# Patient Record
Sex: MALE | Race: WHITE | NOT HISPANIC OR LATINO | URBAN - METROPOLITAN AREA
[De-identification: names, ages, dates, MRNs, and addresses within clinical notes are randomized per-mention and may not be internally consistent; named-entity substitution may affect disease eponyms.]

---

## 2018-01-07 ENCOUNTER — INPATIENT (INPATIENT)
Facility: HOSPITAL | Age: 63
LOS: 5 days | Discharge: HOME | DRG: 163 | End: 2018-01-13
Attending: SPECIALIST | Admitting: SPECIALIST
Payer: MEDICAID

## 2018-01-07 VITALS
OXYGEN SATURATION: 97 % | TEMPERATURE: 98 F | RESPIRATION RATE: 18 BRPM | HEART RATE: 110 BPM | DIASTOLIC BLOOD PRESSURE: 70 MMHG | HEIGHT: 70 IN | SYSTOLIC BLOOD PRESSURE: 135 MMHG | WEIGHT: 201.94 LBS

## 2018-01-07 DIAGNOSIS — Z93.8 OTHER ARTIFICIAL OPENING STATUS: Chronic | ICD-10-CM

## 2018-01-07 DIAGNOSIS — D64.9 ANEMIA, UNSPECIFIED: ICD-10-CM

## 2018-01-07 DIAGNOSIS — R63.8 OTHER SYMPTOMS AND SIGNS CONCERNING FOOD AND FLUID INTAKE: ICD-10-CM

## 2018-01-07 DIAGNOSIS — Z29.9 ENCOUNTER FOR PROPHYLACTIC MEASURES, UNSPECIFIED: ICD-10-CM

## 2018-01-07 DIAGNOSIS — I34.1 NONRHEUMATIC MITRAL (VALVE) PROLAPSE: ICD-10-CM

## 2018-01-07 DIAGNOSIS — D47.3 ESSENTIAL (HEMORRHAGIC) THROMBOCYTHEMIA: ICD-10-CM

## 2018-01-07 DIAGNOSIS — J90 PLEURAL EFFUSION, NOT ELSEWHERE CLASSIFIED: ICD-10-CM

## 2018-01-07 DIAGNOSIS — I10 ESSENTIAL (PRIMARY) HYPERTENSION: ICD-10-CM

## 2018-01-07 DIAGNOSIS — J18.9 PNEUMONIA, UNSPECIFIED ORGANISM: ICD-10-CM

## 2018-01-07 LAB
ALBUMIN SERPL ELPH-MCNC: 3.2 G/DL — LOW (ref 3.3–5)
ALP SERPL-CCNC: 105 U/L — SIGNIFICANT CHANGE UP (ref 40–120)
ALT FLD-CCNC: 69 U/L — HIGH (ref 10–45)
ANION GAP SERPL CALC-SCNC: 13 MMOL/L — SIGNIFICANT CHANGE UP (ref 5–17)
APPEARANCE UR: CLEAR — SIGNIFICANT CHANGE UP
APTT BLD: 27 SEC — LOW (ref 27.5–37.4)
AST SERPL-CCNC: 27 U/L — SIGNIFICANT CHANGE UP (ref 10–40)
BASOPHILS NFR BLD AUTO: 0.4 % — SIGNIFICANT CHANGE UP (ref 0–2)
BILIRUB SERPL-MCNC: 0.3 MG/DL — SIGNIFICANT CHANGE UP (ref 0.2–1.2)
BILIRUB UR-MCNC: NEGATIVE — SIGNIFICANT CHANGE UP
BUN SERPL-MCNC: 14 MG/DL — SIGNIFICANT CHANGE UP (ref 7–23)
CALCIUM SERPL-MCNC: 8.5 MG/DL — SIGNIFICANT CHANGE UP (ref 8.4–10.5)
CHLORIDE SERPL-SCNC: 103 MMOL/L — SIGNIFICANT CHANGE UP (ref 96–108)
CK MB CFR SERPL CALC: 1.9 NG/ML — SIGNIFICANT CHANGE UP (ref 0–6.7)
CK SERPL-CCNC: 69 U/L — SIGNIFICANT CHANGE UP (ref 30–200)
CO2 SERPL-SCNC: 26 MMOL/L — SIGNIFICANT CHANGE UP (ref 22–31)
COLOR SPEC: YELLOW — SIGNIFICANT CHANGE UP
CREAT SERPL-MCNC: 0.95 MG/DL — SIGNIFICANT CHANGE UP (ref 0.5–1.3)
DIFF PNL FLD: NEGATIVE — SIGNIFICANT CHANGE UP
EOSINOPHIL NFR BLD AUTO: 2.4 % — SIGNIFICANT CHANGE UP (ref 0–6)
EXTRA SST TUBE: SIGNIFICANT CHANGE UP
GLUCOSE SERPL-MCNC: 122 MG/DL — HIGH (ref 70–99)
GLUCOSE UR QL: NEGATIVE — SIGNIFICANT CHANGE UP
HCT VFR BLD CALC: 38 % — LOW (ref 39–50)
HGB BLD-MCNC: 12.3 G/DL — LOW (ref 13–17)
INR BLD: 1.13 — SIGNIFICANT CHANGE UP (ref 0.88–1.16)
KETONES UR-MCNC: NEGATIVE — SIGNIFICANT CHANGE UP
LEUKOCYTE ESTERASE UR-ACNC: NEGATIVE — SIGNIFICANT CHANGE UP
LYMPHOCYTES # BLD AUTO: 14.9 % — SIGNIFICANT CHANGE UP (ref 13–44)
MCHC RBC-ENTMCNC: 29.5 PG — SIGNIFICANT CHANGE UP (ref 27–34)
MCHC RBC-ENTMCNC: 32.4 G/DL — SIGNIFICANT CHANGE UP (ref 32–36)
MCV RBC AUTO: 91.1 FL — SIGNIFICANT CHANGE UP (ref 80–100)
MONOCYTES NFR BLD AUTO: 6.5 % — SIGNIFICANT CHANGE UP (ref 2–14)
NEUTROPHILS NFR BLD AUTO: 75.8 % — SIGNIFICANT CHANGE UP (ref 43–77)
NITRITE UR-MCNC: POSITIVE
PH UR: 5.5 — SIGNIFICANT CHANGE UP (ref 5–8)
PLATELET # BLD AUTO: 523 K/UL — HIGH (ref 150–400)
POTASSIUM SERPL-MCNC: 3.8 MMOL/L — SIGNIFICANT CHANGE UP (ref 3.5–5.3)
POTASSIUM SERPL-SCNC: 3.8 MMOL/L — SIGNIFICANT CHANGE UP (ref 3.5–5.3)
PROT SERPL-MCNC: 6.8 G/DL — SIGNIFICANT CHANGE UP (ref 6–8.3)
PROT UR-MCNC: NEGATIVE MG/DL — SIGNIFICANT CHANGE UP
PROTHROM AB SERPL-ACNC: 12.6 SEC — SIGNIFICANT CHANGE UP (ref 9.8–12.7)
RBC # BLD: 4.17 M/UL — LOW (ref 4.2–5.8)
RBC # FLD: 12 % — SIGNIFICANT CHANGE UP (ref 10.3–16.9)
SODIUM SERPL-SCNC: 142 MMOL/L — SIGNIFICANT CHANGE UP (ref 135–145)
SP GR SPEC: >=1.03 — SIGNIFICANT CHANGE UP (ref 1–1.03)
TROPONIN T SERPL-MCNC: <0.01 NG/ML — SIGNIFICANT CHANGE UP (ref 0–0.01)
UROBILINOGEN FLD QL: 0.2 E.U./DL — SIGNIFICANT CHANGE UP
WBC # BLD: 10.6 K/UL — HIGH (ref 3.8–10.5)
WBC # FLD AUTO: 10.6 K/UL — HIGH (ref 3.8–10.5)

## 2018-01-07 PROCEDURE — 71045 X-RAY EXAM CHEST 1 VIEW: CPT | Mod: 26

## 2018-01-07 PROCEDURE — 93010 ELECTROCARDIOGRAM REPORT: CPT

## 2018-01-07 PROCEDURE — 99285 EMERGENCY DEPT VISIT HI MDM: CPT | Mod: 25

## 2018-01-07 PROCEDURE — 99223 1ST HOSP IP/OBS HIGH 75: CPT | Mod: GC

## 2018-01-07 RX ORDER — HEPARIN SODIUM 5000 [USP'U]/ML
5000 INJECTION INTRAVENOUS; SUBCUTANEOUS EVERY 8 HOURS
Qty: 0 | Refills: 0 | Status: DISCONTINUED | OUTPATIENT
Start: 2018-01-07 | End: 2018-01-09

## 2018-01-07 RX ORDER — ACETAMINOPHEN 500 MG
650 TABLET ORAL EVERY 6 HOURS
Qty: 0 | Refills: 0 | Status: DISCONTINUED | OUTPATIENT
Start: 2018-01-07 | End: 2018-01-09

## 2018-01-07 RX ORDER — AMLODIPINE BESYLATE 2.5 MG/1
10 TABLET ORAL DAILY
Qty: 0 | Refills: 0 | Status: DISCONTINUED | OUTPATIENT
Start: 2018-01-08 | End: 2018-01-08

## 2018-01-07 RX ADMIN — HEPARIN SODIUM 5000 UNIT(S): 5000 INJECTION INTRAVENOUS; SUBCUTANEOUS at 22:49
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
## 2018-01-07 NOTE — ED PROVIDER NOTE - OBJECTIVE STATEMENT
was admitted to a hospital in [place] about 10 days ago for pneumonia, pleural effusion on left.  had chest tube placed to drain for about 5 days  told he might require a VATS procedure.  wanted to come to [place] because his mother's pulmonologist is here.

## 2018-01-07 NOTE — H&P ADULT - ASSESSMENT
62M PMH HTN and MVP (Dx 1999) presented w/ SOB after leaving AMA from [facility] w/ L pleural effusion s/p chest tube.

## 2018-01-07 NOTE — ED ADULT TRIAGE NOTE - ESI TRIAGE ACUITY LEVEL, MLM
Date: 5/22/2019    Patient Name: Greg Henderson          To Whom it may concern: This letter has been written at the patient's request. The above patient was seen at the [facility] for treatment of a medical condition.     The patient li
2

## 2018-01-07 NOTE — H&P ADULT - PROBLEM SELECTOR PLAN 2
In ED patient w/ borderline tachycardia (around 100) and borderline leukocytosis (10.6), not neutrophilic predominant. Not tachypneic and normotensive. No distress on exam. Complains of dry cough, no sputum.  -Consider antibiotic therapy, Vanc/Zosyn given recent hospitalization  -F/u Pulm recs In ED patient w/ borderline tachycardia (around 100) and borderline leukocytosis (10.6), not neutrophilic predominant. Not tachypneic and normotensive. No distress on exam. Complains of dry cough, no sputum.  -Consider antibiotic therapy, Vanc/Zosyn given recent hospitalization -- obtain collateral from [facility] regarding Abx therapy, etc  -F/u Pulm recs In ED patient w/ borderline tachycardia (around 100) and borderline leukocytosis (10.6), not neutrophilic predominant. Not tachypneic and normotensive. No distress on exam. Complains of dry cough, no sputum.  -Consider antibiotic therapy, Vanc/Zosyn given recent hospitalization -- obtain collateral from [facility] regarding Abx therapy, etc. Called hospital and was given resident group pager number -- [phone]  -F/u Pulm recs

## 2018-01-07 NOTE — H&P ADULT - ATTENDING COMMENTS
Pt seen and examined at bedside on 1/6/2017 @ 2300    Agree with HPI, ROS as above.     VS, Labs, FH, SH, allergies, medications, imaging reviewed. Agree with physical exam as above    A/P: 82yo M with PMHx of HTN, HLD, thoracic aortic aneurysm, BPH, and benign bladder tumor s/p removal 15yrs ago, comes to the ED complaining of dysuria, frequency, fever and weakness since yesterday, found to have sepsis 2/2 UTI.    **Sepsis  -Pt meeting 3/4 SIRS criteria with + U/A in the setting of recent cystoscopy  -C/w Zosyn as per Urology recs (given increased risk of Pseudomonas with recent instrumentation)  -LA wnl  -F/u UCx, blood cultures  -Dr. Garcia in AM (patient's outpatient ID)    Plan otherwise as outlined above.... Pt seen and examined at bedside on 1/7/2017 @ 2300    Agree with HPI, ROS as above.     VS, Labs, FH, SH, allergies, medications, imaging reviewed. Agree with physical exam as above    A/P: 62M PMH HTN and MVP (Dx 1999) presented w/ SOB after leaving AMA from [facility] w/ L pleural effusion s/p chest tube.    **Pleural effusion  -    Plan otherwise as outlined above.... Pt seen and examined at bedside on 1/7/2017 @ 2300    Agree with HPI, ROS as above.     VS, Labs, FH, SH, allergies, medications, imaging reviewed. I personally reviewed the CT chest - large L sided pleural effusion. Agree with physical exam as above    A/P: 62M PMH HTN and MVP (Dx 1999) presented w/ SOB after leaving AMA from [facility] w/ L pleural effusion s/p chest tube.    **Pleural effusion  -Given history of PNA with pleural effusion as described above  -C/w Vancomycin/Zosyn - obtain collateral in AM from outside hospital  -Pulm consulted - will evaluate for possible thoracentesis vs VATS (pt would then require CT surgery consult)  -F/u cultures    Plan otherwise as outlined above....

## 2018-01-07 NOTE — H&P ADULT - PROBLEM SELECTOR PLAN 1
Had chest tube at [facility] [place] for approx 1 week. Received tPA and there was discussion of VATS. Patient preferred to come to 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 On exam, diminished BS on L side from mid lung inferiorly, consistent w/ findings on CXR on admission here. Patient does not have his records from [facility] on arrival to ED. Saturating 97-98% on RA in ED. Not a smoker but etiologic DDx should include malignancy.  -Pulm consult, F/u recs  -O2 PRN, not needed at this time Had chest tube at [facility] for approx 1 week. Received tPA and there was discussion of VATS. Patient preferred to come to 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 On exam, diminished BS on L side from mid lung inferiorly, consistent w/ findings on CXR on admission here. Patient does not have his records from [facility] on arrival to ED. Saturating 97-98% on RA in ED. Not a smoker but etiologic DDx should include malignancy.  -Pulm consulted, F/u recs -- they will see him in AM  -O2 PRN, not needed at this time

## 2018-01-07 NOTE — H&P ADULT - NSHPLABSRESULTS_GEN_ALL_CORE
LABS: REVIEWED                        12.3   10.6  )-----------( 523      ( 07 Jan 2018 15:16 )             38.0     01-07    142  |  103  |  14  ----------------------------<  122<H>  3.8   |  26  |  0.95    Ca    8.5      07 Jan 2018 15:16    TPro  6.8  /  Alb  3.2<L>  /  TBili  0.3  /  DBili  x   /  AST  27  /  ALT  69<H>  /  AlkPhos  105  01-07    PT/INR - ( 07 Jan 2018 15:16 )   PT: 12.6 sec;   INR: 1.13       PTT - ( 07 Jan 2018 15:16 )  PTT:27.0 sec    CARDIAC MARKERS ( 07 Jan 2018 15:16 )  x     / <0.01 ng/mL / 69 U/L / x     / 1.9 ng/mL    RADIOLOGY, EKG & ADDITIONAL TESTS: REVIEWED

## 2018-01-07 NOTE — H&P ADULT - FAMILY HISTORY
Mother  Still living? Unknown  Family history of Wegener's granulomatosis, Age at diagnosis: Age Unknown  Family history of atrial fibrillation, Age at diagnosis: Age Unknown  Family history of hypertension, Age at diagnosis: Age Unknown     Sibling  Still living? Unknown  Family history of mitral valve prolapse, Age at diagnosis: Age Unknown

## 2018-01-07 NOTE — ED ADULT NURSE NOTE - OBJECTIVE STATEMENT
pt having SOB and left sided CP since yesterday ,was discharged from hospital in [place] on Friday,diagnosed with pneumonia and pleural effusion ,had a left sided chest drain inserted and removed in [place] pt having SOB and left sided CP since yesterday ,was discharged from hospital in [place] on Friday,diagnosed with pneumonia and pleural effusion ,had a left sided chest drain inserted and removed in [place],states that a large amount of fluid was drained pt having SOB and left sided CP since yesterday ,was discharged from hospital in [place] on Friday,diagnosed with pneumonia and pleural effusion ,had a left sided chest drain inserted and removed in [place],states that a large amount of fluid was drained,pt also states that he had TPA on Jan 4th

## 2018-01-07 NOTE — H&P ADULT - NSHPSOCIALHISTORY_GEN_ALL_CORE
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 Has never smoked cigarettes, used alcohol or drugs.

## 2018-01-07 NOTE — H&P ADULT - NSHPPHYSICALEXAM_GEN_ALL_CORE
VITAL SIGNS:  T(C): 36.9 (01-07-18 @ 14:34), Max: 36.9 (01-07-18 @ 14:34)  T(F): 98.4 (01-07-18 @ 14:34), Max: 98.4 (01-07-18 @ 14:34)  HR: 103 (01-07-18 @ 15:16) (103 - 110)  BP: 142/82 (01-07-18 @ 15:16) (135/70 - 142/82)  BP(mean): --  RR: 18 (01-07-18 @ 15:16) (18 - 18)  SpO2: 98% (01-07-18 @ 15:16) (97% - 98%)  Wt(kg): --    PHYSICAL EXAM:  Constitutional: WDWN sitting up in chair in NAD; well-appearing  Eyes: PERRL, EOMI, anicteric sclera  ENT: no nasal discharge; uvula midline, no oropharyngeal erythema or exudates; MMM  Respiratory: decreased/absent breath sounds from mid lung inferiorly on L side; otherwise CTA B/L; no W/R/R, no retractions; no respiratory distress  Cardiac: +S1/S2; RRR, borderline tachycardia; systolic murmur auscultated  Gastrointestinal: soft, round, NT/ND; no rebound or guarding; +BS  Extremities: WWP; no peripheral edema, radial/pedal pulses + b/L  Lymphatic: no submandibular or cervical LAD  Neurologic: AOx3; CNII-XII grossly intact

## 2018-01-07 NOTE — ED ADULT TRIAGE NOTE - CHIEF COMPLAINT QUOTE
Patient c/o sob and chest pain for 2 days . Pt. was d/c from [facility] last friday for PNA and pleural effusion .

## 2018-01-07 NOTE — ED ADULT NURSE NOTE - CHIEF COMPLAINT QUOTE
Patient c/o sob and chest pain for 2 days . Pt. was d/c from [facility] last friday for PNA and pleural effusion .

## 2018-01-08 DIAGNOSIS — J86.9 PYOTHORAX WITHOUT FISTULA: ICD-10-CM

## 2018-01-08 LAB
ALBUMIN SERPL ELPH-MCNC: 2.6 G/DL — LOW (ref 3.3–5)
ALP SERPL-CCNC: 93 U/L — SIGNIFICANT CHANGE UP (ref 40–120)
ALT FLD-CCNC: 50 U/L — HIGH (ref 10–45)
ANION GAP SERPL CALC-SCNC: 10 MMOL/L — SIGNIFICANT CHANGE UP (ref 5–17)
AST SERPL-CCNC: 22 U/L — SIGNIFICANT CHANGE UP (ref 10–40)
BILIRUB SERPL-MCNC: 0.4 MG/DL — SIGNIFICANT CHANGE UP (ref 0.2–1.2)
BUN SERPL-MCNC: 13 MG/DL — SIGNIFICANT CHANGE UP (ref 7–23)
CALCIUM SERPL-MCNC: 8.4 MG/DL — SIGNIFICANT CHANGE UP (ref 8.4–10.5)
CHLORIDE SERPL-SCNC: 102 MMOL/L — SIGNIFICANT CHANGE UP (ref 96–108)
CO2 SERPL-SCNC: 27 MMOL/L — SIGNIFICANT CHANGE UP (ref 22–31)
CREAT SERPL-MCNC: 0.96 MG/DL — SIGNIFICANT CHANGE UP (ref 0.5–1.3)
FERRITIN SERPL-MCNC: 360.8 NG/ML — SIGNIFICANT CHANGE UP (ref 30–400)
FOLATE SERPL-MCNC: 14.2 NG/ML — SIGNIFICANT CHANGE UP (ref 4.8–24.2)
GLUCOSE SERPL-MCNC: 82 MG/DL — SIGNIFICANT CHANGE UP (ref 70–99)
HCT VFR BLD CALC: 35.3 % — LOW (ref 39–50)
HGB BLD-MCNC: 11.2 G/DL — LOW (ref 13–17)
IRON SATN MFR SERPL: 34 % — SIGNIFICANT CHANGE UP (ref 16–55)
IRON SATN MFR SERPL: 55 UG/DL — SIGNIFICANT CHANGE UP (ref 45–165)
MCHC RBC-ENTMCNC: 29.4 PG — SIGNIFICANT CHANGE UP (ref 27–34)
MCHC RBC-ENTMCNC: 31.7 G/DL — LOW (ref 32–36)
MCV RBC AUTO: 92.7 FL — SIGNIFICANT CHANGE UP (ref 80–100)
PLATELET # BLD AUTO: 423 K/UL — HIGH (ref 150–400)
POTASSIUM SERPL-MCNC: 4 MMOL/L — SIGNIFICANT CHANGE UP (ref 3.5–5.3)
POTASSIUM SERPL-SCNC: 4 MMOL/L — SIGNIFICANT CHANGE UP (ref 3.5–5.3)
PROT SERPL-MCNC: 6 G/DL — SIGNIFICANT CHANGE UP (ref 6–8.3)
RAPID RVP RESULT: SIGNIFICANT CHANGE UP
RBC # BLD: 3.81 M/UL — LOW (ref 4.2–5.8)
RBC # FLD: 12.3 % — SIGNIFICANT CHANGE UP (ref 10.3–16.9)
SODIUM SERPL-SCNC: 139 MMOL/L — SIGNIFICANT CHANGE UP (ref 135–145)
TIBC SERPL-MCNC: 164 UG/DL — LOW (ref 220–430)
TRANSFERRIN SERPL-MCNC: 127 MG/DL — LOW (ref 200–360)
UIBC SERPL-MCNC: 109 UG/DL — LOW (ref 110–370)
VIT B12 SERPL-MCNC: >2000 PG/ML — HIGH (ref 232–1245)
WBC # BLD: 8.9 K/UL — SIGNIFICANT CHANGE UP (ref 3.8–10.5)
WBC # FLD AUTO: 8.9 K/UL — SIGNIFICANT CHANGE UP (ref 3.8–10.5)

## 2018-01-08 PROCEDURE — 93306 TTE W/DOPPLER COMPLETE: CPT | Mod: 26

## 2018-01-08 PROCEDURE — 99233 SBSQ HOSP IP/OBS HIGH 50: CPT | Mod: GC

## 2018-01-08 PROCEDURE — 71250 CT THORAX DX C-: CPT | Mod: 26

## 2018-01-08 PROCEDURE — 99255 IP/OBS CONSLTJ NEW/EST HI 80: CPT | Mod: 57

## 2018-01-08 PROCEDURE — 75561 CARDIAC MRI FOR MORPH W/DYE: CPT | Mod: 26

## 2018-01-08 PROCEDURE — 99254 IP/OBS CNSLTJ NEW/EST MOD 60: CPT | Mod: GC

## 2018-01-08 PROCEDURE — 99222 1ST HOSP IP/OBS MODERATE 55: CPT

## 2018-01-08 PROCEDURE — 94010 BREATHING CAPACITY TEST: CPT | Mod: 26

## 2018-01-08 RX ORDER — CHLORHEXIDINE GLUCONATE 213 G/1000ML
1 SOLUTION TOPICAL ONCE
Qty: 0 | Refills: 0 | Status: COMPLETED | OUTPATIENT
Start: 2018-01-08 | End: 2018-01-08

## 2018-01-08 RX ORDER — AMLODIPINE BESYLATE 2.5 MG/1
5 TABLET ORAL DAILY
Qty: 0 | Refills: 0 | Status: DISCONTINUED | OUTPATIENT
Start: 2018-01-09 | End: 2018-01-09

## 2018-01-08 RX ORDER — METOPROLOL TARTRATE 50 MG
12.5 TABLET ORAL EVERY 12 HOURS
Qty: 0 | Refills: 0 | Status: DISCONTINUED | OUTPATIENT
Start: 2018-01-08 | End: 2018-01-09

## 2018-01-08 RX ORDER — VANCOMYCIN HCL 1 G
1500 VIAL (EA) INTRAVENOUS EVERY 12 HOURS
Qty: 0 | Refills: 0 | Status: DISCONTINUED | OUTPATIENT
Start: 2018-01-08 | End: 2018-01-09

## 2018-01-08 RX ORDER — PIPERACILLIN AND TAZOBACTAM 4; .5 G/20ML; G/20ML
4.5 INJECTION, POWDER, LYOPHILIZED, FOR SOLUTION INTRAVENOUS EVERY 6 HOURS
Qty: 0 | Refills: 0 | Status: DISCONTINUED | OUTPATIENT
Start: 2018-01-08 | End: 2018-01-09

## 2018-01-08 RX ORDER — SODIUM CHLORIDE 9 MG/ML
1000 INJECTION INTRAMUSCULAR; INTRAVENOUS; SUBCUTANEOUS
Qty: 0 | Refills: 0 | Status: DISCONTINUED | OUTPATIENT
Start: 2018-01-08 | End: 2018-01-09

## 2018-01-08 RX ORDER — CHLORHEXIDINE GLUCONATE 213 G/1000ML
10 SOLUTION TOPICAL ONCE
Qty: 0 | Refills: 0 | Status: COMPLETED | OUTPATIENT
Start: 2018-01-08 | End: 2018-01-09

## 2018-01-08 RX ADMIN — Medication 12.5 MILLIGRAM(S): at 18:33

## 2018-01-08 RX ADMIN — SODIUM CHLORIDE 125 MILLILITER(S): 9 INJECTION INTRAMUSCULAR; INTRAVENOUS; SUBCUTANEOUS at 16:51

## 2018-01-08 RX ADMIN — PIPERACILLIN AND TAZOBACTAM 200 GRAM(S): 4; .5 INJECTION, POWDER, LYOPHILIZED, FOR SOLUTION INTRAVENOUS at 12:20

## 2018-01-08 RX ADMIN — PIPERACILLIN AND TAZOBACTAM 200 GRAM(S): 4; .5 INJECTION, POWDER, LYOPHILIZED, FOR SOLUTION INTRAVENOUS at 21:11

## 2018-01-08 RX ADMIN — HEPARIN SODIUM 5000 UNIT(S): 5000 INJECTION INTRAVENOUS; SUBCUTANEOUS at 14:30

## 2018-01-08 RX ADMIN — Medication 100 MILLIGRAM(S): at 02:24

## 2018-01-08 RX ADMIN — AMLODIPINE BESYLATE 10 MILLIGRAM(S): 2.5 TABLET ORAL at 06:39

## 2018-01-08 RX ADMIN — Medication 100 MILLIGRAM(S): at 21:11

## 2018-01-08 RX ADMIN — HEPARIN SODIUM 5000 UNIT(S): 5000 INJECTION INTRAVENOUS; SUBCUTANEOUS at 06:40

## 2018-01-08 RX ADMIN — Medication 150 MILLIGRAM(S): at 18:32

## 2018-01-08 RX ADMIN — Medication 100 MILLIGRAM(S): at 06:25

## 2018-01-08 RX ADMIN — CHLORHEXIDINE GLUCONATE 1 APPLICATION(S): 213 SOLUTION TOPICAL at 22:49

## 2018-01-08 RX ADMIN — Medication 100 MILLIGRAM(S): at 14:31

## 2018-01-08 RX ADMIN — PIPERACILLIN AND TAZOBACTAM 25 GRAM(S): 4; .5 INJECTION, POWDER, LYOPHILIZED, FOR SOLUTION INTRAVENOUS at 01:15

## 2018-01-08 RX ADMIN — PIPERACILLIN AND TAZOBACTAM 25 GRAM(S): 4; .5 INJECTION, POWDER, LYOPHILIZED, FOR SOLUTION INTRAVENOUS at 06:39

## 2018-01-08 RX ADMIN — Medication 300 MILLIGRAM(S): at 02:16

## 2018-01-08 RX ADMIN — Medication 100 MILLIGRAM(S): at 18:32

## 2018-01-08 RX ADMIN — CHLORHEXIDINE GLUCONATE 1 APPLICATION(S): 213 SOLUTION TOPICAL at 19:40

## 2018-01-08 RX ADMIN — HEPARIN SODIUM 5000 UNIT(S): 5000 INJECTION INTRAVENOUS; SUBCUTANEOUS at 21:11

## 2018-01-08 RX ADMIN — Medication 100 MILLIGRAM(S): at 10:24

## 2018-01-08 NOTE — CONSULT NOTE ADULT - SUBJECTIVE AND OBJECTIVE BOX
**INCOMPLETE**    Patient is a 62y old  Male who presents with a chief complaint of SOB (07 Jan 2018 17:20)    HPI:  62M with PMHx of HTN and MVP (Dx 1999) who presents with complaints of dyspnea, ongoing for >1mo. Per history, patient began to notice mild shortness of breath and fatigue around Thanksgiving 2017, was given a course of antibiotics for upper respiratory symptoms, but without significant improvement. On 12/26, patient noticed worsening symptoms and went to [facility]. On admission, patient reports being told that he had pneumonia with evidence of pleural effusion. Patient received a course of antibiotics. A chest tube was placed on 12/28. On 1/4, patient received tPA through the chest tube without reported significant improvement in size of effusion. During his time at [place], there was discussion and recommendation for VATS; however, patient was dissatisfied with the medical care he received, and instead decided to leave AMA and transfer to [facility] where his mother has been a patient of Dr. Brown for many years. Chest tube was removed on 1/4 and patient discharged on 1/5. He presented to [facility] on 1/7 with complaints of persistent dyspnea with exertion, a nonproductive cough and chest wall pain worsened with deep inspiration and cough.     REVIEW OF SYSTEMS:  Constitutional: No fever, weight loss or fatigue  Eyes: No eye pain, visual disturbances, or discharge  ENMT:  No difficulty hearing, tinnitus, vertigo; No sinus or throat pain  Neck: No pain, stiffness or neck swelling  Respiratory: +dyspnea, cough, pain with deep inspiration. No wheezing, chills or hemoptysis  Cardiovascular: No chest pain, palpitations, dizziness or leg swelling  Gastrointestinal: No abdominal or epigastric pain. No nausea, vomiting or hematemesis; No diarrhea or constipation. No melena or hematochezia.  Genitourinary: No dysuria, frequency, hematuria or incontinence  Neurological: No headaches, memory loss, loss of strength, numbness or tremors  Skin: No itching, burning, rashes or lesions   Lymph Nodes: No enlarged glands  Endocrine: No heat or cold intolerance; No hair loss  Musculoskeletal: No joint pain or swelling; No muscle, back or extremity pain  Psychiatric: No depression, anxiety, mood swings or difficulty sleeping  Heme/Lymph: No easy bruising or bleeding gums  Allergy and Immunologic: No hives or eczema    PAST MEDICAL & SURGICAL HISTORY:  Hypertension  Pleural effusion  Pneumonia  S/P thoracostomy tube placement    FAMILY HISTORY:  Family history of mitral valve prolapse (Sibling)  Family history of hypertension (Mother)  Family history of atrial fibrillation (Mother)  Family history of Wegener's granulomatosis (Mother)    SOCIAL HISTORY:  Denies tobacco, alcohol or illicit drug use.     MEDICATIONS:  Pulmonary:  guaiFENesin    Syrup 100 milliGRAM(s) Oral every 4 hours    Antimicrobials:  piperacillin/tazobactam IVPB. 4.5 Gram(s) IV Intermittent every 6 hours  vancomycin  IVPB 1500 milliGRAM(s) IV Intermittent every 12 hours    Anticoagulants:  heparin  Injectable 5000 Unit(s) SubCutaneous every 8 hours    Cardiac:  amLODIPine   Tablet 10 milliGRAM(s) Oral daily    Other Medications:  acetaminophen   Tablet 650 milliGRAM(s) Oral every 6 hours PRN  chlorhexidine 0.12% Liquid 10 milliLiter(s) Swish and Spit once  chlorhexidine 4% Liquid 1 Application(s) Topical once  chlorhexidine 4% Liquid 1 Application(s) Topical once    Allergies  No Known Drug Allergies  Seafood (Rash)    Intolerances  lactose (Vomiting)    Vital Signs Last 24 Hrs  T(C): 37.1 (08 Jan 2018 08:30), Max: 37.1 (08 Jan 2018 08:30)  T(F): 98.7 (08 Jan 2018 08:30), Max: 98.7 (08 Jan 2018 08:30)  HR: 98 (08 Jan 2018 08:30) (74 - 110)  BP: 118/71 (08 Jan 2018 08:30) (117/77 - 142/82)  RR: 18 (08 Jan 2018 08:30) (18 - 18)  SpO2: 97% (08 Jan 2018 08:30) (97% - 99%)    PHYSICAL EXAM:  General: Well developed; well nourished; in no acute distress  Eyes: PERRL, EOM intact; conjunctiva and sclera clear  Head: Normocephalic; atraumatic  ENMT: No nasal discharge; airway clear  Neck: Supple; non tender; no masses  Respiratory: Diminished breath sounds in the left posterior lung field, negative egophony. Scattered rales in the right basilar area. No wheezing appreciated.    Cardiovascular: Regular rate and rhythm. +S1S2. Systolic murmur appreciated.    Gastrointestinal: Soft non-tender non-distended; Normal bowel sounds; No hepatosplenomegaly  Extremities: Normal range of motion, No clubbing, cyanosis or edema  Vascular: Peripheral pulses palpable 2+ bilaterally  Neurological: Alert and oriented x3  Skin: Warm and dry. No obvious rash  Psychiatric: Cooperative and appropriate mood    LABS:                      11.2   8.9   )-----------( 423      ( 08 Jan 2018 06:43 )             35.3     139  |  102  |  13  ----------------------------<  82  4.0   |  27  |  0.96    Ca    8.4          TPro  6.0  /  Alb  2.6<L>  /  TBili  0.4  /  DBili  x   /  AST  22  /  ALT  50<H>  /  AlkPhos  93      PT/INR - ( 07 Jan 2018 15:16 )   PT: 12.6 sec;   INR: 1.13     PTT - ( 07 Jan 2018 15:16 )  PTT:27.0 sec    Urinalysis Basic - ( 07 Jan 2018 19:01 )  Color: Yellow / Appearance: Clear / SG: >=1.030 / pH: x  Gluc: x / Ketone: NEGATIVE  / Bili: Negative / Urobili: 0.2 E.U./dL   Blood: x / Protein: NEGATIVE mg/dL / Nitrite: POSITIVE   Leuk Esterase: NEGATIVE / RBC: < 5 /HPF / WBC < 5 /HPF   Sq Epi: x / Non Sq Epi: 0-5 /HPF / Bacteria: Present /HPF    RADIOLOGY & ADDITIONAL STUDIES (The following images were personally reviewed): **INCOMPLETE**    Patient is a 62y old  Male who presents with a chief complaint of SOB (07 Jan 2018 17:20)    HPI:  62M with PMHx of HTN and MVP (Dx 1999) who presents with complaints of dyspnea, ongoing for >1mo. Per history, patient began to notice mild shortness of breath and fatigue around Thanksgiving 2017, was given a course of antibiotics for upper respiratory symptoms, but without significant improvement. On 12/26, patient noticed worsening symptoms and went to [facility]. On admission, patient reports being told that he had pneumonia with evidence of pleural effusion. Patient received a course of antibiotics. A chest tube was placed on 12/28. On 1/4, patient received tPA through the chest tube without reported significant improvement in size of effusion. During his time at [place], there was discussion and recommendation for VATS; however, patient was dissatisfied with the medical care he received, and instead decided to leave AMA and transfer to [facility] where his mother has been a patient of Dr. Brown for many years. Chest tube was removed on 1/4 and patient discharged on 1/5. He presented to [facility] on 1/7 with complaints of persistent dyspnea with exertion, a nonproductive cough and chest wall pain worsened with deep inspiration and cough.     REVIEW OF SYSTEMS:  Constitutional: No fever, weight loss or fatigue  Eyes: No eye pain, visual disturbances, or discharge  ENMT:  No difficulty hearing, tinnitus, vertigo; No sinus or throat pain  Neck: No pain, stiffness or neck swelling  Respiratory: +dyspnea, cough, pain with deep inspiration. No wheezing, chills or hemoptysis  Cardiovascular: No chest pain, palpitations, dizziness or leg swelling  Gastrointestinal: No abdominal or epigastric pain. No nausea, vomiting or hematemesis; No diarrhea or constipation. No melena or hematochezia.  Genitourinary: No dysuria, frequency, hematuria or incontinence  Neurological: No headaches, memory loss, loss of strength, numbness or tremors  Skin: No itching, burning, rashes or lesions   Lymph Nodes: No enlarged glands  Endocrine: No heat or cold intolerance; No hair loss  Musculoskeletal: No joint pain or swelling; No muscle, back or extremity pain  Psychiatric: No depression, anxiety, mood swings or difficulty sleeping  Heme/Lymph: No easy bruising or bleeding gums  Allergy and Immunologic: No hives or eczema    PAST MEDICAL & SURGICAL HISTORY:  Hypertension  Pleural effusion  Pneumonia  S/P thoracostomy tube placement    FAMILY HISTORY:  Family history of mitral valve prolapse (Sibling)  Family history of hypertension (Mother)  Family history of atrial fibrillation (Mother)  Family history of Wegener's granulomatosis (Mother)    SOCIAL HISTORY:  Denies tobacco, alcohol or illicit drug use.     MEDICATIONS:  Pulmonary:  guaiFENesin    Syrup 100 milliGRAM(s) Oral every 4 hours    Antimicrobials:  piperacillin/tazobactam IVPB. 4.5 Gram(s) IV Intermittent every 6 hours  vancomycin  IVPB 1500 milliGRAM(s) IV Intermittent every 12 hours    Anticoagulants:  heparin  Injectable 5000 Unit(s) SubCutaneous every 8 hours    Cardiac:  amLODIPine   Tablet 10 milliGRAM(s) Oral daily    Other Medications:  acetaminophen   Tablet 650 milliGRAM(s) Oral every 6 hours PRN  chlorhexidine 0.12% Liquid 10 milliLiter(s) Swish and Spit once  chlorhexidine 4% Liquid 1 Application(s) Topical once  chlorhexidine 4% Liquid 1 Application(s) Topical once    Allergies  No Known Drug Allergies  Seafood (Rash)    Intolerances  lactose (Vomiting)    Vital Signs Last 24 Hrs  T(C): 37.1 (08 Jan 2018 08:30), Max: 37.1 (08 Jan 2018 08:30)  T(F): 98.7 (08 Jan 2018 08:30), Max: 98.7 (08 Jan 2018 08:30)  HR: 98 (08 Jan 2018 08:30) (74 - 110)  BP: 118/71 (08 Jan 2018 08:30) (117/77 - 142/82)  RR: 18 (08 Jan 2018 08:30) (18 - 18)  SpO2: 97% (08 Jan 2018 08:30) (97% - 99%)    PHYSICAL EXAM:  General: Well developed; well nourished; in no acute distress  Eyes: PERRL, EOM intact; conjunctiva and sclera clear  Head: Normocephalic; atraumatic  ENMT: No nasal discharge; airway clear  Neck: Supple; non tender; no masses  Respiratory: Diminished breath sounds in the left posterior lung field, negative egophony. Scattered rales in the right basilar area. No wheezing appreciated.    Cardiovascular: Regular rate and rhythm. +S1S2. Systolic murmur appreciated.    Gastrointestinal: Soft non-tender non-distended; Normal bowel sounds; No hepatosplenomegaly  Extremities: Normal range of motion, No clubbing, cyanosis or edema  Vascular: Peripheral pulses palpable 2+ bilaterally  Neurological: Alert and oriented x3  Skin: Warm and dry. No obvious rash  Psychiatric: Cooperative and appropriate mood    LABS:                      11.2   8.9   )-----------( 423      ( 08 Jan 2018 06:43 )             35.3     139  |  102  |  13  ----------------------------<  82  4.0   |  27  |  0.96    Ca    8.4          TPro  6.0  /  Alb  2.6<L>  /  TBili  0.4  /  DBili  x   /  AST  22  /  ALT  50<H>  /  AlkPhos  93      PT/INR - ( 07 Jan 2018 15:16 )   PT: 12.6 sec;   INR: 1.13     PTT - ( 07 Jan 2018 15:16 )  PTT:27.0 sec    Urinalysis Basic - ( 07 Jan 2018 19:01 )  Color: Yellow / Appearance: Clear / SG: >=1.030 / pH: x  Gluc: x / Ketone: NEGATIVE  / Bili: Negative / Urobili: 0.2 E.U./dL   Blood: x / Protein: NEGATIVE mg/dL / Nitrite: POSITIVE   Leuk Esterase: NEGATIVE / RBC: < 5 /HPF / WBC < 5 /HPF   Sq Epi: x / Non Sq Epi: 0-5 /HPF / Bacteria: Present /HPF    RADIOLOGY & ADDITIONAL STUDIES (The following images were personally reviewed): CXR, CT Patient is a 62y old  Male who presents with a chief complaint of SOB (07 Jan 2018 17:20)    HPI:  62M with PMHx of HTN and MVP (Dx 1999) who presents with complaints of dyspnea, ongoing for >1mo. Per history, patient began to notice mild shortness of breath and fatigue around Thanksgiving 2017, was given a course of antibiotics for upper respiratory symptoms, but without significant improvement. On 12/26, patient noticed worsening symptoms and went to [facility]. On admission, patient reports being told that he had pneumonia with evidence of pleural effusion. Patient received a course of antibiotics. A chest tube was placed on 12/28. On 1/4, patient received tPA through the chest tube without reported significant improvement in size of effusion. During his time at [place], there was discussion and recommendation for VATS; however, patient was dissatisfied with the medical care he received, and instead decided to leave AMA and transfer to [facility] where his mother has been a patient of Dr. Brown for many years. Chest tube was removed on 1/4 and patient discharged on 1/5. He presented to [facility] on 1/7 with complaints of persistent dyspnea with exertion, a nonproductive cough and chest wall pain worsened with deep inspiration and cough.     REVIEW OF SYSTEMS:  Constitutional: No fever, weight loss or fatigue  Eyes: No eye pain, visual disturbances, or discharge  ENMT:  No difficulty hearing, tinnitus, vertigo; No sinus or throat pain  Neck: No pain, stiffness or neck swelling  Respiratory: +dyspnea, cough, pain with deep inspiration. No wheezing, chills or hemoptysis  Cardiovascular: No chest pain, palpitations, dizziness or leg swelling  Gastrointestinal: No abdominal or epigastric pain. No nausea, vomiting or hematemesis; No diarrhea or constipation. No melena or hematochezia.  Genitourinary: No dysuria, frequency, hematuria or incontinence  Neurological: No headaches, memory loss, loss of strength, numbness or tremors  Skin: No itching, burning, rashes or lesions   Lymph Nodes: No enlarged glands  Endocrine: No heat or cold intolerance; No hair loss  Musculoskeletal: No joint pain or swelling; No muscle, back or extremity pain  Psychiatric: No depression, anxiety, mood swings or difficulty sleeping  Heme/Lymph: No easy bruising or bleeding gums  Allergy and Immunologic: No hives or eczema    PAST MEDICAL & SURGICAL HISTORY:  Hypertension  Pleural effusion  Pneumonia  S/P thoracostomy tube placement    FAMILY HISTORY:  Family history of mitral valve prolapse (Sibling)  Family history of hypertension (Mother)  Family history of atrial fibrillation (Mother)  Family history of Wegener's granulomatosis (Mother)    SOCIAL HISTORY:  Denies tobacco, alcohol or illicit drug use.     MEDICATIONS:  Pulmonary:  guaiFENesin    Syrup 100 milliGRAM(s) Oral every 4 hours    Antimicrobials:  piperacillin/tazobactam IVPB. 4.5 Gram(s) IV Intermittent every 6 hours  vancomycin  IVPB 1500 milliGRAM(s) IV Intermittent every 12 hours    Anticoagulants:  heparin  Injectable 5000 Unit(s) SubCutaneous every 8 hours    Cardiac:  amLODIPine   Tablet 10 milliGRAM(s) Oral daily    Other Medications:  acetaminophen   Tablet 650 milliGRAM(s) Oral every 6 hours PRN  chlorhexidine 0.12% Liquid 10 milliLiter(s) Swish and Spit once  chlorhexidine 4% Liquid 1 Application(s) Topical once  chlorhexidine 4% Liquid 1 Application(s) Topical once    Allergies  No Known Drug Allergies  Seafood (Rash)    Intolerances  lactose (Vomiting)    Vital Signs Last 24 Hrs  T(C): 37.1 (08 Jan 2018 08:30), Max: 37.1 (08 Jan 2018 08:30)  T(F): 98.7 (08 Jan 2018 08:30), Max: 98.7 (08 Jan 2018 08:30)  HR: 98 (08 Jan 2018 08:30) (74 - 110)  BP: 118/71 (08 Jan 2018 08:30) (117/77 - 142/82)  RR: 18 (08 Jan 2018 08:30) (18 - 18)  SpO2: 97% (08 Jan 2018 08:30) (97% - 99%)    PHYSICAL EXAM:  General: Well developed; well nourished; in no acute distress  Eyes: PERRL, EOM intact; conjunctiva and sclera clear  Head: Normocephalic; atraumatic  ENMT: No nasal discharge; airway clear  Neck: Supple; non tender; no masses  Respiratory: Diminished breath sounds in the left posterior lung field, negative egophony. Scattered rales in the right basilar area. No wheezing appreciated.    Cardiovascular: Regular rate and rhythm. +S1S2. Systolic murmur appreciated.    Gastrointestinal: Soft non-tender non-distended; Normal bowel sounds; No hepatosplenomegaly  Extremities: Normal range of motion, No clubbing, cyanosis or edema  Vascular: Peripheral pulses palpable 2+ bilaterally  Neurological: Alert and oriented x3  Skin: Warm and dry. No obvious rash  Psychiatric: Cooperative and appropriate mood    LABS:                      11.2   8.9   )-----------( 423      ( 08 Jan 2018 06:43 )             35.3     139  |  102  |  13  ----------------------------<  82  4.0   |  27  |  0.96    Ca    8.4          TPro  6.0  /  Alb  2.6<L>  /  TBili  0.4  /  DBili  x   /  AST  22  /  ALT  50<H>  /  AlkPhos  93      PT/INR - ( 07 Jan 2018 15:16 )   PT: 12.6 sec;   INR: 1.13     PTT - ( 07 Jan 2018 15:16 )  PTT:27.0 sec    Urinalysis Basic - ( 07 Jan 2018 19:01 )  Color: Yellow / Appearance: Clear / SG: >=1.030 / pH: x  Gluc: x / Ketone: NEGATIVE  / Bili: Negative / Urobili: 0.2 E.U./dL   Blood: x / Protein: NEGATIVE mg/dL / Nitrite: POSITIVE   Leuk Esterase: NEGATIVE / RBC: < 5 /HPF / WBC < 5 /HPF   Sq Epi: x / Non Sq Epi: 0-5 /HPF / Bacteria: Present /HPF    RADIOLOGY & ADDITIONAL STUDIES (The following images were personally reviewed): CXR, CT

## 2018-01-08 NOTE — CONSULT NOTE ADULT - PROBLEM SELECTOR RECOMMENDATION 2
CT chest with findings of a dense consolidation within the left lower lobe   with partial aeration of the superior segment of the left lower lobe as well as a groundglass opacity within the right lower lobe. Cannot rule out underlying pneumonia in this patient, though suspicion is low as patient does not endorse change in cough, is afebrile and without leukocytosis.   - recommend completing 7 day course of Zosyn and Vancomycin for treatment of hospital-acquired pneumonia   - follow up sputum and blood culture
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
## 2018-01-08 NOTE — PROGRESS NOTE ADULT - PROBLEM SELECTOR PLAN 7
F: no fluids  E: Replete electrolytes PRN K > 4, Mg > 2   N: Regular diet F: no fluids needed  E: Replete electrolytes PRN K > 4, Mg > 2   N: Regular diet, NPO after midnight for VATS

## 2018-01-08 NOTE — PROGRESS NOTE ADULT - PROBLEM SELECTOR PLAN 4
Diagnosed 1999, unclear if patient had Echo in the past. Audible murmur on exam.  -F/u Echo Iron studies consistent w/ ACD. Admission Hgb 12.3.  -Trend CBC  -F/u Folate, B12

## 2018-01-08 NOTE — PROGRESS NOTE ADULT - PROBLEM SELECTOR PLAN 3
-Norvasc 10mg starting tomorrow (took dose this AM) Patient reports diagnosis in 1999; audible murmur on exam. 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 TTE did not show MVP but KERWIN is more specific.  -Consider KERWIN Patient reports diagnosis in 1999; audible murmur on exam. 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 TTE did not show MVP but KERWIN is more specific.  -Consider KERWIN

## 2018-01-08 NOTE — PROGRESS NOTE ADULT - PROBLEM SELECTOR PLAN 5
MCV 91.1, unknown baseline at this time, unknown etiology.  -F/u Fe studies  -F/u Folate, B12 -Norvasc 10mg daily

## 2018-01-08 NOTE — PROGRESS NOTE ADULT - PROBLEM SELECTOR PLAN 1
Had chest tube at [facility] for approx 1 week. Received tPA and there was discussion of VATS. Patient preferred to come to [facility] as his mother is patient of Dr. Brown for Granulomatosis w/ Polyangiitis. On exam, diminished BS on L side from mid lung inferiorly, consistent w/ findings on CXR on admission here. Patient does not have his records from [facility] on arrival to ED. Saturating 97-98% on RA in ED. Not a smoker but etiologic DDx should include malignancy.  -Pulm consulted, F/u recs -- they will see him in AM  -O2 PRN, not needed at this time Had chest tube at [facility] for approx 1 week. Received tPA and there was discussion of VATS. Patient preferred to come to [facility] as his mother is patient of Dr. Brown for Granulomatosis w/ Polyangiitis. On exam, diminished BS on L side from mid lung inferiorly, consistent w/ findings on CXR on admission here. Patient does not have his records from [facility] on arrival to ED. CT chest findings s/f loculated pleural effusion w/ presence of air. Now seen by Pulm and CT surg w/ plan for VATS tomorrow pending medical clearance.  -F/u CT surgery, NPO after midnight w/ pre-surgical labs ordered  -Obtain medical clearance for surgery  -F/u Pulm recs  -O2 PRN, not needed at this time

## 2018-01-08 NOTE — PROGRESS NOTE ADULT - PROBLEM SELECTOR PLAN 1
F/U Echo, EKG.      T&S added to labs.  Needs 2 samples.     Pending medical clearance will proceed with VATS, decortication tomorrow.
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
## 2018-01-08 NOTE — PROGRESS NOTE ADULT - ASSESSMENT
63 y/o male with empyema, failed chest tube trial with TPA.  Now being placed on schedule for VATS/decortication tomorrow with Dr. Gauthier. He remains hemodynamically stable, oxygenation status stable.

## 2018-01-08 NOTE — CONSULT NOTE ADULT - PROBLEM SELECTOR RECOMMENDATION 9
Patient with chronic dyspnea that began in November 2017, at which time he was treated for presumed pneumonia. No resolution of symptoms forced patient to go to outside hospital where he was found to have a left pleural effusion s/p chest tube placement on 12/28. Patient had no relief of symptoms and no reported significant improvement in effusion size, so he left outside hospital and presented to [facility]. On CT imaging, there is evidence of a large partially loculated left sided pleural effusion with multiple air locules likely due to previous chest tube placement, but also consistent with possible gas-producing bacteria or bronchopleural fistula.   - given loculated nature of effusion, would recommend VATS in this patient  - can also consider a repeat trial with chest tube placement, however given loculations, a chest tube might be suboptimal   - appreciate cardiothoracic recommendations
Pre-op for bronchoscopy, left VATS, decortication tomorrow with Dr. Gauthier.      Agree with TTE for h/o mitral valve disease.  Please provide medical clearance note for surgery once echo is completed.      Will add-on T&S to labs.  And 2 units PRBC on hold for OR.      Consent to be obtained later.  NPO after midnight tonight.      Continue w/ ABX per primary team and pulm.  O2 as needed for SOB

## 2018-01-08 NOTE — CONSULT NOTE ADULT - ATTENDING COMMENTS
Seen and examined by me. Basically healthy 61yo man with >1 month of fatigue and dyspnea presents now after failed tube thoracostomy and TPA x 1 (per his own report) for complicated parapneumonic effusion at an OSH. Favor VATS, but alternative would be chest tube placement w TPA/DNAse instillation. Appreciate thoracic involvement; d/w Dr Gauthier.

## 2018-01-08 NOTE — PROGRESS NOTE ADULT - PROBLEM SELECTOR PLAN 6
Unknown baseline. Possibly reactive in setting of infection.  -F/u AM CBC Unknown baseline, PLT on presentation 523. Possibly reactive in setting of infection. This AM .  -Trend CBC
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
## 2018-01-08 NOTE — PROGRESS NOTE ADULT - PROBLEM SELECTOR PLAN 2
In ED patient w/ borderline tachycardia (around 100) and borderline leukocytosis (10.6), not neutrophilic predominant. Not tachypneic and normotensive. No distress on exam. Complains of dry cough, no sputum.  -Consider antibiotic therapy, Vanc/Zosyn given recent hospitalization -- obtain collateral from [facility] regarding Abx therapy, etc. Called hospital and was given resident group pager number -- [phone]  -F/u Pulm recs In ED patient w/ borderline tachycardia (around 100) and borderline leukocytosis (10.6), not neutrophilic predominant. Today w/ resolved leukocytosis and tachycardia (no IVF administered); continues w/ VS WNL.  -Vanc/Zosyn given recent hospitilzation; Pulm recs 7 day course for HAP  -Vanc trough ordered. 4th dose   -F/u Pulm recs In ED patient w/ borderline tachycardia (around 100) and borderline leukocytosis (10.6), not neutrophilic predominant. Today w/ resolved leukocytosis and tachycardia (no IVF administered); continues w/ VS WNL.  -Vanc/Zosyn given recent hospitilzation; Pulm recs 7 day course for HAP, (course 1/8-1/14)  -Vanc trough ordered for 4PM 1/9, 4th dose scheduled for 6PM 1/9  -F/u Pulm recs  -F/u CT surg recs

## 2018-01-08 NOTE — PROGRESS NOTE ADULT - SUBJECTIVE AND OBJECTIVE BOX
TRANSFER NOTE: 4 [facility] (boarding in [facility]) to 7 [facility]    HOSPITAL COURSE: 62M PMH HTN and MVP (Dx 1999) presented w/ SOB. Patient started feeling low energy around Thanksgiving 2017, was given outpatient antibiotics for upper respiratory symptoms, but w/ lack of improvement was admitted to [facility] for PNA on 12/26, where he was given Abx (at least Zosyn). 12/28 received L chest tube w/ pigtail drain for pleural effusion. On 1/4 underwent tPA. There was discussion of VATS, but patient unhappy w/ facility so left AMA and came to [facility] where his mother has been a patient of Dr. Brown for Granulomatosis w/ Polyangiitis. Presented w/ SOB worse on exertion, nonproductive cough and pleuritic CP. In ED T 98.4, , /70, RR 18, O sat 97% on RA. WBC 10.6, labs otherwise unremarkable. CXR showing L pleural effusion nearly to mid lung, CT chest showing dense LLL consolidation w/ moderate layering loculated effusion containing air, consistent with possible gas-producing bacteria or bronchopleural fistula. Admitted to 4 [facility], Pulm consulted, and started on Vanc-Zosyn. CT surgery consulted w/ plan for VATS tomorrow. 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    SUBJECTIVE: Patient seen and examined at bedside. No overnight events.    ROS:  CV: Denies chest pain  Resp: Denies SOB  GI: Denies abdominal pain, diarrhea, nausea, vomiting  ID: Denies fevers, chills    OBJECTIVE:    VITALS  Vital Signs Last 24 Hrs  T(C): 37 (08 Jan 2018 13:10), Max: 37.1 (08 Jan 2018 08:30)  T(F): 98.6 (08 Jan 2018 13:10), Max: 98.7 (08 Jan 2018 08:30)  HR: 94 (08 Jan 2018 13:10) (74 - 110)  BP: 129/78 (08 Jan 2018 13:10) (117/77 - 142/82)  BP(mean): --  RR: 18 (08 Jan 2018 13:10) (18 - 18)  SpO2: 96% (08 Jan 2018 13:10) (96% - 99%)    PHYSICAL EXAM  General: middle aged man sleeping comfortably in bed in NAD; AOx3  Eyes: EOM grossly intact; no scleral icterus  ENMT: MMM, no pharyngeal erythema/exudate  Respiratory: decreased/absent breath sounds from mid lung inferiorly on L side; otherwise CTA B/L; no W/R/R, no retractions; no respiratory distress  Cardiovascular: +S1/S2; RRR, borderline tachycardia; systolic murmur auscultated  Gastrointestinal: Soft; NTND without guarding; bowel sounds present  Extremities: WWP; no edema; radial/pedal pulses palpable  Neurological:  CNII-XII grossly intact    MEDICATIONS  (STANDING):  amLODIPine   Tablet 10 milliGRAM(s) Oral daily  chlorhexidine 0.12% Liquid 10 milliLiter(s) Swish and Spit once  chlorhexidine 4% Liquid 1 Application(s) Topical once  chlorhexidine 4% Liquid 1 Application(s) Topical once  guaiFENesin    Syrup 100 milliGRAM(s) Oral every 4 hours  heparin  Injectable 5000 Unit(s) SubCutaneous every 8 hours  piperacillin/tazobactam IVPB. 4.5 Gram(s) IV Intermittent every 6 hours  vancomycin  IVPB 1500 milliGRAM(s) IV Intermittent every 12 hours    MEDICATIONS  (PRN):  acetaminophen   Tablet 650 milliGRAM(s) Oral every 6 hours PRN For Temp greater than 38 C (100.4 F)    LABS                        11.2   8.9   )-----------( 423      ( 08 Jan 2018 06:43 )             35.3     01-08    139  |  102  |  13  ----------------------------<  82  4.0   |  27  |  0.96    Ca    8.4      08 Jan 2018 06:41    TPro  6.0  /  Alb  2.6<L>  /  TBili  0.4  /  DBili  x   /  AST  22  /  ALT  50<H>  /  AlkPhos  93  01-08    LIVER FUNCTIONS - ( 08 Jan 2018 06:41 )  Alb: 2.6 g/dL / Pro: 6.0 g/dL / ALK PHOS: 93 U/L / ALT: 50 U/L / AST: 22 U/L / GGT: x           PT/INR - ( 07 Jan 2018 15:16 )   PT: 12.6 sec;   INR: 1.13       PTT - ( 07 Jan 2018 15:16 )  PTT:27.0 sec  Urinalysis Basic - ( 07 Jan 2018 19:01 )    Color: Yellow / Appearance: Clear / SG: >=1.030 / pH: x  Gluc: x / Ketone: NEGATIVE  / Bili: Negative / Urobili: 0.2 E.U./dL   Blood: x / Protein: NEGATIVE mg/dL / Nitrite: POSITIVE   Leuk Esterase: NEGATIVE / RBC: < 5 /HPF / WBC < 5 /HPF   Sq Epi: x / Non Sq Epi: 0-5 /HPF / Bacteria: Present /HPF    CARDIAC MARKERS ( 07 Jan 2018 15:16 )  x     / <0.01 ng/mL / 69 U/L / x     / 1.9 ng/mL    Urinalysis Basic - ( 07 Jan 2018 19:01 )    Color: Yellow / Appearance: Clear / SG: >=1.030 / pH: x  Gluc: x / Ketone: NEGATIVE  / Bili: Negative / Urobili: 0.2 E.U./dL   Blood: x / Protein: NEGATIVE mg/dL / Nitrite: POSITIVE   Leuk Esterase: NEGATIVE / RBC: < 5 /HPF / WBC < 5 /HPF   Sq Epi: x / Non Sq Epi: 0-5 /HPF / Bacteria: Present /HPF    ALLERGIES:  lactose (Vomiting)  No Known Drug Allergies  Seafood (Rash)    RADIOLOGY and Additional Tests reviewed. TRANSFER NOTE: 4 [facility] (boarding in [facility]) to 7 [facility]    HOSPITAL COURSE: 62M PMH HTN and MVP (Dx 1999) presented w/ SOB. Patient started feeling low energy around Thanksgiving 2017, was given outpatient antibiotics for upper respiratory symptoms, but w/ lack of improvement was admitted to [facility] for PNA on 12/26, where he was given Abx (at least Zosyn). 12/28 received L chest tube w/ pigtail drain for pleural effusion. On 1/4 underwent tPA. There was discussion of VATS, but patient unhappy w/ facility so left AMA and came to 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 Presented w/ SOB worse on exertion, nonproductive cough and pleuritic CP. In ED T 98.4, , /70, RR 18, O sat 97% on RA. WBC 10.6, labs otherwise unremarkable. CXR showing L pleural effusion nearly to mid lung, CT chest showing dense LLL consolidation w/ moderate layering loculated effusion containing air, consistent with possible gas-producing bacteria or bronchopleural fistula. Admitted to 4 [facility], Pulm consulted, and started on Vanc-Zosyn. CT surgery consulted w/ plan for VATS tomorrow. 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    SUBJECTIVE: Patient seen and examined at bedside. No overnight events.    ROS:  CV: Denies chest pain  Resp: Denies SOB  GI: Denies abdominal pain, diarrhea, nausea, vomiting  ID: Denies fevers, chills    OBJECTIVE:    VITALS  Vital Signs Last 24 Hrs  T(C): 37 (08 Jan 2018 13:10), Max: 37.1 (08 Jan 2018 08:30)  T(F): 98.6 (08 Jan 2018 13:10), Max: 98.7 (08 Jan 2018 08:30)  HR: 94 (08 Jan 2018 13:10) (74 - 110)  BP: 129/78 (08 Jan 2018 13:10) (117/77 - 142/82)  BP(mean): --  RR: 18 (08 Jan 2018 13:10) (18 - 18)  SpO2: 96% (08 Jan 2018 13:10) (96% - 99%)    PHYSICAL EXAM  General: middle aged man sleeping comfortably in bed in NAD; AOx3  Eyes: EOM grossly intact; no scleral icterus  ENMT: MMM, no pharyngeal erythema/exudate  Respiratory: decreased/absent breath sounds from mid lung inferiorly on L side; otherwise CTA B/L; no W/R/R, no retractions; no respiratory distress  Cardiovascular: +S1/S2; RRR, borderline tachycardia; systolic murmur auscultated  Gastrointestinal: Soft; NTND without guarding; bowel sounds present  Extremities: WWP; no edema; radial/pedal pulses palpable  Neurological:  CNII-XII grossly intact    MEDICATIONS  (STANDING):  amLODIPine   Tablet 10 milliGRAM(s) Oral daily  chlorhexidine 0.12% Liquid 10 milliLiter(s) Swish and Spit once  chlorhexidine 4% Liquid 1 Application(s) Topical once  chlorhexidine 4% Liquid 1 Application(s) Topical once  guaiFENesin    Syrup 100 milliGRAM(s) Oral every 4 hours  heparin  Injectable 5000 Unit(s) SubCutaneous every 8 hours  piperacillin/tazobactam IVPB. 4.5 Gram(s) IV Intermittent every 6 hours  vancomycin  IVPB 1500 milliGRAM(s) IV Intermittent every 12 hours    MEDICATIONS  (PRN):  acetaminophen   Tablet 650 milliGRAM(s) Oral every 6 hours PRN For Temp greater than 38 C (100.4 F)    LABS                        11.2   8.9   )-----------( 423      ( 08 Jan 2018 06:43 )             35.3     01-08    139  |  102  |  13  ----------------------------<  82  4.0   |  27  |  0.96    Ca    8.4      08 Jan 2018 06:41    TPro  6.0  /  Alb  2.6<L>  /  TBili  0.4  /  DBili  x   /  AST  22  /  ALT  50<H>  /  AlkPhos  93  01-08    LIVER FUNCTIONS - ( 08 Jan 2018 06:41 )  Alb: 2.6 g/dL / Pro: 6.0 g/dL / ALK PHOS: 93 U/L / ALT: 50 U/L / AST: 22 U/L / GGT: x           PT/INR - ( 07 Jan 2018 15:16 )   PT: 12.6 sec;   INR: 1.13       PTT - ( 07 Jan 2018 15:16 )  PTT:27.0 sec  Urinalysis Basic - ( 07 Jan 2018 19:01 )    Color: Yellow / Appearance: Clear / SG: >=1.030 / pH: x  Gluc: x / Ketone: NEGATIVE  / Bili: Negative / Urobili: 0.2 E.U./dL   Blood: x / Protein: NEGATIVE mg/dL / Nitrite: POSITIVE   Leuk Esterase: NEGATIVE / RBC: < 5 /HPF / WBC < 5 /HPF   Sq Epi: x / Non Sq Epi: 0-5 /HPF / Bacteria: Present /HPF    CARDIAC MARKERS ( 07 Jan 2018 15:16 )  x     / <0.01 ng/mL / 69 U/L / x     / 1.9 ng/mL    Urinalysis Basic - ( 07 Jan 2018 19:01 )    Color: Yellow / Appearance: Clear / SG: >=1.030 / pH: x  Gluc: x / Ketone: NEGATIVE  / Bili: Negative / Urobili: 0.2 E.U./dL   Blood: x / Protein: NEGATIVE mg/dL / Nitrite: POSITIVE   Leuk Esterase: NEGATIVE / RBC: < 5 /HPF / WBC < 5 /HPF   Sq Epi: x / Non Sq Epi: 0-5 /HPF / Bacteria: Present /HPF    ALLERGIES:  lactose (Vomiting)  No Known Drug Allergies  Seafood (Rash)    RADIOLOGY and Additional Tests reviewed.

## 2018-01-08 NOTE — CONSULT NOTE ADULT - ASSESSMENT
62M with PMHx of HTN and MVP who presents with complaints of dyspnea, ongoing for >1mo, found to have a loculated left pleural effusion at outside hospital s/p chest tube placement there without significant drainage, now comes to [facility] for further management.
63 y/o male with PMHx HTN, and mitral valve prolapse diagnosed in 1999, who presented to our ED yesterday with c/o SOB and pleuritic chest pain.  Found to have left empyema, parapneumonic PNA diangosed >1 week ago.  Failed chest tube wtih TPA trial;  Now will need a VATS/decortication.

## 2018-01-08 NOTE — PROGRESS NOTE ADULT - ASSESSMENT
62M PMH HTN and MVP (Dx 1999) presented w/ SOB after leaving AMA from [facility] w/ L loculated pleural effusion s/p chest tube, planning to undergo VATS.

## 2018-01-08 NOTE — CHART NOTE - NSCHARTNOTEFT_GEN_A_CORE
Dr. Gauthier had an extensive discussion with the patient regarding the risks/benefits of surgery tomorrow, and what his surgery would entail.  He explained that although he will attempt doing his surgery minimally invasive (VATS), if he is unable to safely perform the surgery minimally invasively he would have to do a thoracotomy.  This decision will be made in the operating room, and the patient is fully aware of the possibility of a thoracotomy.  We also reviewed the risks and benefits of surgery including but not limited to bleeding requiring blood transfusion, infection at the surgery site, and possible reaccumulation of fluid/infection after surgery.  He is agreeable to surgery and we will proceed tomorrow afternoon.      F/U on official echo results.

## 2018-01-09 ENCOUNTER — RESULT REVIEW (OUTPATIENT)
Age: 63
End: 2018-01-09

## 2018-01-09 ENCOUNTER — APPOINTMENT (OUTPATIENT)
Dept: THORACIC SURGERY | Facility: HOSPITAL | Age: 63
End: 2018-01-09

## 2018-01-09 PROBLEM — J18.9 PNEUMONIA, UNSPECIFIED ORGANISM: Chronic | Status: ACTIVE | Noted: 2018-01-07

## 2018-01-09 PROBLEM — I10 ESSENTIAL (PRIMARY) HYPERTENSION: Chronic | Status: ACTIVE | Noted: 2018-01-07

## 2018-01-09 PROBLEM — J90 PLEURAL EFFUSION, NOT ELSEWHERE CLASSIFIED: Chronic | Status: ACTIVE | Noted: 2018-01-07

## 2018-01-09 LAB
ANION GAP SERPL CALC-SCNC: 14 MMOL/L — SIGNIFICANT CHANGE UP (ref 5–17)
APPEARANCE UR: CLEAR — SIGNIFICANT CHANGE UP
APTT BLD: 29.5 SEC — SIGNIFICANT CHANGE UP (ref 27.5–37.4)
BILIRUB UR-MCNC: NEGATIVE — SIGNIFICANT CHANGE UP
BLD GP AB SCN SERPL QL: NEGATIVE — SIGNIFICANT CHANGE UP
BUN SERPL-MCNC: 13 MG/DL — SIGNIFICANT CHANGE UP (ref 7–23)
CALCIUM SERPL-MCNC: 9 MG/DL — SIGNIFICANT CHANGE UP (ref 8.4–10.5)
CHLORIDE SERPL-SCNC: 97 MMOL/L — SIGNIFICANT CHANGE UP (ref 96–108)
CO2 SERPL-SCNC: 26 MMOL/L — SIGNIFICANT CHANGE UP (ref 22–31)
COLOR SPEC: YELLOW — SIGNIFICANT CHANGE UP
CREAT SERPL-MCNC: 1.05 MG/DL — SIGNIFICANT CHANGE UP (ref 0.5–1.3)
CULTURE RESULTS: SIGNIFICANT CHANGE UP
DIFF PNL FLD: (no result)
GLUCOSE SERPL-MCNC: 159 MG/DL — HIGH (ref 70–99)
GLUCOSE UR QL: NEGATIVE — SIGNIFICANT CHANGE UP
GRAM STN FLD: SIGNIFICANT CHANGE UP
GRAM STN FLD: SIGNIFICANT CHANGE UP
HCT VFR BLD CALC: 42.2 % — SIGNIFICANT CHANGE UP (ref 39–50)
HGB BLD-MCNC: 13.5 G/DL — SIGNIFICANT CHANGE UP (ref 13–17)
INR BLD: 1.14 — SIGNIFICANT CHANGE UP (ref 0.88–1.16)
KETONES UR-MCNC: NEGATIVE — SIGNIFICANT CHANGE UP
LEGIONELLA AG UR QL: NEGATIVE — SIGNIFICANT CHANGE UP
LEUKOCYTE ESTERASE UR-ACNC: NEGATIVE — SIGNIFICANT CHANGE UP
MAGNESIUM SERPL-MCNC: 2 MG/DL — SIGNIFICANT CHANGE UP (ref 1.6–2.6)
MAGNESIUM SERPL-MCNC: 2.1 MG/DL — SIGNIFICANT CHANGE UP (ref 1.6–2.6)
MCHC RBC-ENTMCNC: 29.2 PG — SIGNIFICANT CHANGE UP (ref 27–34)
MCHC RBC-ENTMCNC: 32 G/DL — SIGNIFICANT CHANGE UP (ref 32–36)
MCV RBC AUTO: 91.1 FL — SIGNIFICANT CHANGE UP (ref 80–100)
NITRITE UR-MCNC: NEGATIVE — SIGNIFICANT CHANGE UP
PH UR: 6 — SIGNIFICANT CHANGE UP (ref 5–8)
PHOSPHATE SERPL-MCNC: 4 MG/DL — SIGNIFICANT CHANGE UP (ref 2.5–4.5)
PLATELET # BLD AUTO: 518 K/UL — HIGH (ref 150–400)
POTASSIUM SERPL-MCNC: 4.4 MMOL/L — SIGNIFICANT CHANGE UP (ref 3.5–5.3)
POTASSIUM SERPL-SCNC: 4.4 MMOL/L — SIGNIFICANT CHANGE UP (ref 3.5–5.3)
PROT UR-MCNC: NEGATIVE MG/DL — SIGNIFICANT CHANGE UP
PROTHROM AB SERPL-ACNC: 12.7 SEC — SIGNIFICANT CHANGE UP (ref 9.8–12.7)
RBC # BLD: 4.63 M/UL — SIGNIFICANT CHANGE UP (ref 4.2–5.8)
RBC # FLD: 12.5 % — SIGNIFICANT CHANGE UP (ref 10.3–16.9)
RH IG SCN BLD-IMP: POSITIVE — SIGNIFICANT CHANGE UP
SODIUM SERPL-SCNC: 137 MMOL/L — SIGNIFICANT CHANGE UP (ref 135–145)
SP GR SPEC: 1.01 — SIGNIFICANT CHANGE UP (ref 1–1.03)
SPECIMEN SOURCE: SIGNIFICANT CHANGE UP
SPECIMEN SOURCE: SIGNIFICANT CHANGE UP
UROBILINOGEN FLD QL: 0.2 E.U./DL — SIGNIFICANT CHANGE UP
VANCOMYCIN TROUGH SERPL-MCNC: 12 UG/ML — SIGNIFICANT CHANGE UP (ref 10–20)
WBC # BLD: 12.3 K/UL — HIGH (ref 3.8–10.5)
WBC # FLD AUTO: 12.3 K/UL — HIGH (ref 3.8–10.5)

## 2018-01-09 PROCEDURE — 99233 SBSQ HOSP IP/OBS HIGH 50: CPT | Mod: GC

## 2018-01-09 PROCEDURE — 71045 X-RAY EXAM CHEST 1 VIEW: CPT | Mod: 26

## 2018-01-09 PROCEDURE — 99232 SBSQ HOSP IP/OBS MODERATE 35: CPT

## 2018-01-09 PROCEDURE — 31645 BRNCHSC W/THER ASPIR 1ST: CPT

## 2018-01-09 PROCEDURE — 99232 SBSQ HOSP IP/OBS MODERATE 35: CPT | Mod: GC

## 2018-01-09 PROCEDURE — S2900 ROBOTIC SURGICAL SYSTEM: CPT | Mod: NC

## 2018-01-09 PROCEDURE — 32652 THORACOSCOPY REM TOTL CORTEX: CPT

## 2018-01-09 RX ORDER — SODIUM CHLORIDE 9 MG/ML
1000 INJECTION, SOLUTION INTRAVENOUS
Qty: 0 | Refills: 0 | Status: DISCONTINUED | OUTPATIENT
Start: 2018-01-09 | End: 2018-01-09

## 2018-01-09 RX ORDER — DOCUSATE SODIUM 100 MG
100 CAPSULE ORAL THREE TIMES A DAY
Qty: 0 | Refills: 0 | Status: DISCONTINUED | OUTPATIENT
Start: 2018-01-09 | End: 2018-01-13

## 2018-01-09 RX ORDER — DOCUSATE SODIUM 100 MG
100 CAPSULE ORAL THREE TIMES A DAY
Qty: 0 | Refills: 0 | Status: DISCONTINUED | OUTPATIENT
Start: 2018-01-09 | End: 2018-01-11

## 2018-01-09 RX ORDER — METOPROLOL TARTRATE 50 MG
25 TABLET ORAL EVERY 12 HOURS
Qty: 0 | Refills: 0 | Status: DISCONTINUED | OUTPATIENT
Start: 2018-01-09 | End: 2018-01-09

## 2018-01-09 RX ORDER — ACETAMINOPHEN 500 MG
1000 TABLET ORAL ONCE
Qty: 0 | Refills: 0 | Status: COMPLETED | OUTPATIENT
Start: 2018-01-09 | End: 2018-01-09

## 2018-01-09 RX ORDER — LIDOCAINE 4 G/100G
1 CREAM TOPICAL DAILY
Qty: 0 | Refills: 0 | Status: DISCONTINUED | OUTPATIENT
Start: 2018-01-09 | End: 2018-01-13

## 2018-01-09 RX ORDER — FENTANYL CITRATE 50 UG/ML
25 INJECTION INTRAVENOUS ONCE
Qty: 0 | Refills: 0 | Status: DISCONTINUED | OUTPATIENT
Start: 2018-01-09 | End: 2018-01-09

## 2018-01-09 RX ORDER — METOCLOPRAMIDE HCL 10 MG
10 TABLET ORAL ONCE
Qty: 0 | Refills: 0 | Status: COMPLETED | OUTPATIENT
Start: 2018-01-09 | End: 2018-01-09

## 2018-01-09 RX ORDER — PIPERACILLIN AND TAZOBACTAM 4; .5 G/20ML; G/20ML
4.5 INJECTION, POWDER, LYOPHILIZED, FOR SOLUTION INTRAVENOUS EVERY 6 HOURS
Qty: 0 | Refills: 0 | Status: DISCONTINUED | OUTPATIENT
Start: 2018-01-09 | End: 2018-01-11

## 2018-01-09 RX ORDER — MORPHINE SULFATE 50 MG/1
2 CAPSULE, EXTENDED RELEASE ORAL ONCE
Qty: 0 | Refills: 0 | Status: DISCONTINUED | OUTPATIENT
Start: 2018-01-09 | End: 2018-01-09

## 2018-01-09 RX ORDER — FENTANYL CITRATE 50 UG/ML
25 INJECTION INTRAVENOUS EVERY 4 HOURS
Qty: 0 | Refills: 0 | Status: DISCONTINUED | OUTPATIENT
Start: 2018-01-09 | End: 2018-01-11

## 2018-01-09 RX ORDER — SENNA PLUS 8.6 MG/1
2 TABLET ORAL AT BEDTIME
Qty: 0 | Refills: 0 | Status: DISCONTINUED | OUTPATIENT
Start: 2018-01-09 | End: 2018-01-13

## 2018-01-09 RX ORDER — METOPROLOL TARTRATE 50 MG
25 TABLET ORAL EVERY 12 HOURS
Qty: 0 | Refills: 0 | Status: DISCONTINUED | OUTPATIENT
Start: 2018-01-09 | End: 2018-01-13

## 2018-01-09 RX ORDER — SODIUM CHLORIDE 9 MG/ML
1000 INJECTION, SOLUTION INTRAVENOUS
Qty: 0 | Refills: 0 | Status: DISCONTINUED | OUTPATIENT
Start: 2018-01-09 | End: 2018-01-11

## 2018-01-09 RX ORDER — FAMOTIDINE 10 MG/ML
20 INJECTION INTRAVENOUS DAILY
Qty: 0 | Refills: 0 | Status: DISCONTINUED | OUTPATIENT
Start: 2018-01-09 | End: 2018-01-13

## 2018-01-09 RX ORDER — BUPIVACAINE 13.3 MG/ML
20 INJECTION, SUSPENSION, LIPOSOMAL INFILTRATION ONCE
Qty: 0 | Refills: 0 | Status: DISCONTINUED | OUTPATIENT
Start: 2018-01-09 | End: 2018-01-12

## 2018-01-09 RX ORDER — VANCOMYCIN HCL 1 G
1500 VIAL (EA) INTRAVENOUS EVERY 12 HOURS
Qty: 0 | Refills: 0 | Status: DISCONTINUED | OUTPATIENT
Start: 2018-01-09 | End: 2018-01-11

## 2018-01-09 RX ORDER — HEPARIN SODIUM 5000 [USP'U]/ML
5000 INJECTION INTRAVENOUS; SUBCUTANEOUS EVERY 8 HOURS
Qty: 0 | Refills: 0 | Status: DISCONTINUED | OUTPATIENT
Start: 2018-01-09 | End: 2018-01-13

## 2018-01-09 RX ADMIN — Medication 300 MILLIGRAM(S): at 20:30

## 2018-01-09 RX ADMIN — CHLORHEXIDINE GLUCONATE 10 MILLILITER(S): 213 SOLUTION TOPICAL at 06:15

## 2018-01-09 RX ADMIN — Medication 1000 MILLIGRAM(S): at 23:40

## 2018-01-09 RX ADMIN — AMLODIPINE BESYLATE 5 MILLIGRAM(S): 2.5 TABLET ORAL at 05:25

## 2018-01-09 RX ADMIN — MORPHINE SULFATE 2 MILLIGRAM(S): 50 CAPSULE, EXTENDED RELEASE ORAL at 18:12

## 2018-01-09 RX ADMIN — FENTANYL CITRATE 25 MICROGRAM(S): 50 INJECTION INTRAVENOUS at 19:28

## 2018-01-09 RX ADMIN — LIDOCAINE 1 PATCH: 4 CREAM TOPICAL at 17:13

## 2018-01-09 RX ADMIN — Medication 12.5 MILLIGRAM(S): at 05:24

## 2018-01-09 RX ADMIN — Medication 100 MILLIGRAM(S): at 05:24

## 2018-01-09 RX ADMIN — PIPERACILLIN AND TAZOBACTAM 200 GRAM(S): 4; .5 INJECTION, POWDER, LYOPHILIZED, FOR SOLUTION INTRAVENOUS at 18:44

## 2018-01-09 RX ADMIN — FENTANYL CITRATE 25 MICROGRAM(S): 50 INJECTION INTRAVENOUS at 19:08

## 2018-01-09 RX ADMIN — Medication 150 MILLIGRAM(S): at 06:12

## 2018-01-09 RX ADMIN — MORPHINE SULFATE 2 MILLIGRAM(S): 50 CAPSULE, EXTENDED RELEASE ORAL at 17:53

## 2018-01-09 RX ADMIN — Medication 400 MILLIGRAM(S): at 23:19

## 2018-01-09 RX ADMIN — Medication 25 MILLIGRAM(S): at 23:19

## 2018-01-09 RX ADMIN — HEPARIN SODIUM 5000 UNIT(S): 5000 INJECTION INTRAVENOUS; SUBCUTANEOUS at 05:24

## 2018-01-09 RX ADMIN — Medication 100 MILLIGRAM(S): at 01:08

## 2018-01-09 RX ADMIN — Medication 100 MILLIGRAM(S): at 09:41

## 2018-01-09 RX ADMIN — PIPERACILLIN AND TAZOBACTAM 200 GRAM(S): 4; .5 INJECTION, POWDER, LYOPHILIZED, FOR SOLUTION INTRAVENOUS at 09:41

## 2018-01-09 RX ADMIN — PIPERACILLIN AND TAZOBACTAM 200 GRAM(S): 4; .5 INJECTION, POWDER, LYOPHILIZED, FOR SOLUTION INTRAVENOUS at 04:34

## 2018-01-09 NOTE — PROGRESS NOTE ADULT - PROBLEM SELECTOR PLAN 2
In ED patient w/ borderline tachycardia (around 100) and borderline leukocytosis (10.6), not neutrophilic predominant. Today w/ resolved leukocytosis and tachycardia (no IVF administered); continues w/ VS WNL.  -Vanc/Zosyn given recent hospitilzation; Pulm recs 7 day course for HAP, (course 1/8-1/14)  -Vanc trough ordered for 4PM 1/9, 4th dose scheduled for 6PM 1/9  -F/u Pulm recs  -F/u CT surg recs

## 2018-01-09 NOTE — BRIEF OPERATIVE NOTE - PROCEDURE
<<-----Click on this checkbox to enter Procedure Bronchoscopy with decortication, lung, using VATS  01/09/2018    Active  BLAKE

## 2018-01-09 NOTE — PROGRESS NOTE ADULT - PROBLEM SELECTOR PLAN 1
Had chest tube at [facility] for approx 1 week. Received tPA and there was discussion of VATS. Patient preferred to come to [facility] as his mother is patient of Dr. Brown for Granulomatosis w/ Polyangiitis. On exam, diminished BS on L side from mid lung inferiorly, consistent w/ findings on CXR on admission here. Patient does not have his records from [facility] on arrival to ED. CT chest findings s/f loculated pleural effusion w/ presence of air. Now seen by Pulm and CT surg w/ plan for VATS today.    -NPO after midnight  -Patient cleared by pulmonary/ cards for VATS  -F/u Pulm recs  -O2 PRN, not needed at this time Had chest tube at [facility] for approx 1 week. Received tPA and there was discussion of VATS. Patient preferred to come to [facility] as his mother is patient of Dr. Brown for Granulomatosis w/ Polyangiitis. On exam, diminished BS on L side from mid lung inferiorly, consistent w/ findings on CXR on admission here. Patient does not have his records from [facility] on arrival to ED. CT chest findings s/f loculated pleural effusion w/ presence of air. Now seen by Pulm and CT surg w/ plan for VATS today.   -f/u CT surgery recs  -F/u Pulm recs  -O2 PRN, not needed at this time

## 2018-01-09 NOTE — PROGRESS NOTE ADULT - ASSESSMENT
62M PMH HTN and MVP (Dx 1999) presented w/ SOB after leaving AMA from [facility] w/ L loculated pleural effusion s/p chest tube, planning to undergo VATS.

## 2018-01-09 NOTE — PROGRESS NOTE ADULT - PROBLEM SELECTOR PLAN 6
Unknown baseline, PLT on presentation 523. Possibly reactive in setting of infection. This AM .  -Trend CBC

## 2018-01-09 NOTE — PROGRESS NOTE ADULT - SUBJECTIVE AND OBJECTIVE BOX
Interval Events:  Patient seen and examined at bedside. Patient reports that his breathing is better this am. Reports mild pain during inpsiration.    MEDICATIONS:  Pulmonary:  guaiFENesin    Syrup 100 milliGRAM(s) Oral every 4 hours    Antimicrobials:  piperacillin/tazobactam IVPB. 4.5 Gram(s) IV Intermittent every 6 hours  vancomycin  IVPB 1500 milliGRAM(s) IV Intermittent every 12 hours    Anticoagulants:  heparin  Injectable 5000 Unit(s) SubCutaneous every 8 hours    Cardiac:  amLODIPine   Tablet 5 milliGRAM(s) Oral daily  metoprolol     tartrate 25 milliGRAM(s) Oral every 12 hours    Endocrine:    Allergies    No Known Drug Allergies  Seafood (Rash)    Intolerances    lactose (Vomiting)      Vital Signs Last 24 Hrs  T(C): 37.4 (2018 05:13), Max: 37.4 (2018 22:24)  T(F): 99.3 (2018 05:13), Max: 99.3 (2018 22:24)  HR: 95 (2018 05:13) (84 - 98)  BP: 125/78 (2018 05:13) (115/70 - 129/78)  BP(mean): --  RR: 17 (2018 05:13) (17 - 18)  SpO2: 94% (2018 05:13) (94% - 97%)     @ 07:01  -  -09 @ 07:00  --------------------------------------------------------  IN: 0 mL / OUT: 1400 mL / NET: -1400 mL    PHYSICAL EXAM:  General: Well developed; well nourished; in no acute distress  Head: Normocephalic; atraumatic  ENMT: No nasal discharge; airway clear  Neck: Supple; non tender; no masses  Respiratory: Diminished breath sounds in the left posterior lung field, negative egophony. Scattered rales in the right basilar area. No wheezing appreciated.    Cardiovascular: Regular rate and rhythm. +S1S2. Systolic murmur appreciated.    Gastrointestinal: Soft non-tender non-distended  Extremities: Normal range of motion, No clubbing, cyanosis or edema  Vascular: Peripheral pulses palpable 2+ bilaterally  Neurological: Alert and oriented x3  Psychiatric: Cooperative and appropriate mood    LABS:      CBC Full  -  ( 2018 06:43 )  WBC Count : 8.9 K/uL  Hemoglobin : 11.2 g/dL  Hematocrit : 35.3 %  Platelet Count - Automated : 423 K/uL  Mean Cell Volume : 92.7 fL  Mean Cell Hemoglobin : 29.4 pg  Mean Cell Hemoglobin Concentration : 31.7 g/dL  Auto Neutrophil # : x  Auto Lymphocyte # : x  Auto Monocyte # : x  Auto Eosinophil # : x  Auto Basophil # : x  Auto Neutrophil % : x  Auto Lymphocyte % : x  Auto Monocyte % : x  Auto Eosinophil % : x  Auto Basophil % : x        139  |  102  |  13  ----------------------------<  82  4.0   |  27  |  0.96    Ca    8.4      2018 06:41    TPro  6.0  /  Alb  2.6<L>  /  TBili  0.4  /  DBili  x   /  AST  22  /  ALT  50<H>  /  AlkPhos  93  -08    PT/INR - ( 2018 15:16 )   PT: 12.6 sec;   INR: 1.13          PTT - ( 2018 15:16 )  PTT:27.0 sec      Urinalysis Basic - ( 2018 23:46 )    Color: Yellow / Appearance: Clear / S.010 / pH: x  Gluc: x / Ketone: NEGATIVE  / Bili: Negative / Urobili: 0.2 E.U./dL   Blood: x / Protein: NEGATIVE mg/dL / Nitrite: NEGATIVE   Leuk Esterase: NEGATIVE / RBC: < 5 /HPF / WBC < 5 /HPF   Sq Epi: x / Non Sq Epi: 0-5 /HPF / Bacteria: Present /HPF                RADIOLOGY & ADDITIONAL STUDIES (The following images were personally reviewed):

## 2018-01-09 NOTE — PROGRESS NOTE ADULT - SUBJECTIVE AND OBJECTIVE BOX
Chief Complaint/Reason for Consult: abnormal echo  INTERVAL HPI: clinically less sob no syncope no cp   	  MEDICATIONS:  amLODIPine   Tablet 5 milliGRAM(s) Oral daily  metoprolol     tartrate 25 milliGRAM(s) Oral every 12 hours    piperacillin/tazobactam IVPB. 4.5 Gram(s) IV Intermittent every 6 hours  vancomycin  IVPB 1500 milliGRAM(s) IV Intermittent every 12 hours    guaiFENesin    Syrup 100 milliGRAM(s) Oral every 4 hours    acetaminophen   Tablet 650 milliGRAM(s) Oral every 6 hours PRN        heparin  Injectable 5000 Unit(s) SubCutaneous every 8 hours  sodium chloride 0.9%. 1000 milliLiter(s) IV Continuous <Continuous>      REVIEW OF SYSTEMS:  [x] As per HPI  CONSTITUTIONAL: No fever, weight loss, or fatigue  RESPIRATORY: No cough, wheezing, chills or hemoptysis; No Shortness of Breath  CARDIOVASCULAR: No chest pain, palpitations, dizziness, or leg swelling  GASTROINTESTINAL: No abdominal or epigastric pain. No nausea, vomiting, or hematemesis; No diarrhea or constipation. No melena or hematochezia.  MUSCULOSKELETAL: No joint pain or swelling; No muscle, back, or extremity pain  [x] All others negative	  [ ] Unable to obtain    PHYSICAL EXAM:  T(C): 37.3 (01-09-18 @ 09:00), Max: 37.4 (01-08-18 @ 22:24)  HR: 88 (01-09-18 @ 09:00) (84 - 95)  BP: 138/78 (01-09-18 @ 09:00) (115/70 - 138/78)  RR: 19 (01-09-18 @ 09:00) (17 - 19)  SpO2: 97% (01-09-18 @ 09:00) (94% - 97%)  Wt(kg): --  I&O's Summary    08 Jan 2018 07:01  -  09 Jan 2018 07:00  --------------------------------------------------------  IN: 0 mL / OUT: 1400 mL / NET: -1400 mL      Height (cm): 175.26 (01-09 @ 05:30)  Weight (kg): 93 (01-09 @ 05:30)  BMI (kg/m2): 30.3 (01-09 @ 05:30)  BSA (m2): 2.09 (01-09 @ 05:30)    Appearance: Normal	  HEENT:   Normal oral mucosa  Cardiovascular: Normal S1 S2, No JVD, No murmurs, No edema  Respiratory: Lungs clear to auscultation	  Gastrointestinal:  Soft, Non-tender, + BS	  Extremities: Normal range of motion, No clubbing, cyanosis or edema  Vascular: Peripheral pulses palpable 2+ bilaterally    TELEMETRY: 	    ECG:   	  RADIOLOGY:   CXR:  CT:  US:    CARDIAC TESTING:  Echocardiogram:  Catheterization:  Stress Test:      LABS:	 	    CARDIAC MARKERS:                                  11.2   8.9   )-----------( 423      ( 08 Jan 2018 06:43 )             35.3     01-08    139  |  102  |  13  ----------------------------<  82  4.0   |  27  |  0.96    Ca    8.4      08 Jan 2018 06:41    TPro  6.0  /  Alb  2.6<L>  /  TBili  0.4  /  DBili  x   /  AST  22  /  ALT  50<H>  /  AlkPhos  93  01-08    proBNP:   Lipid Profile:   HgA1c:   TSH:     ASSESSMENT/PLAN: 	    # Subaortic membrane with obstruction, tolerated BB 12.5bid, increase to 25bid, continue IV hydration.  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  Cardiac optimized pending CMRI findings

## 2018-01-09 NOTE — PROGRESS NOTE ADULT - PROBLEM SELECTOR PLAN 3
Patient reports diagnosis in 1999; audible murmur on exam. 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

## 2018-01-09 NOTE — PROGRESS NOTE ADULT - PROBLEM SELECTOR PLAN 1
-Patient with recent parapneumonic effusion/empyema with failed initial management with a chest tube and tPA x1.  - Patient had no relief of symptoms and no reported significant improvement in effusion size, so he left outside hospital and presented to [facility].   - On CT imaging, there is evidence of a large partially loculated left sided pleural effusion with multiple air locules likely due to previous chest tube placement  - given loculated nature of effusion, would recommend VATS in this patient  - appreciate cardiothoracic recommendations.  - Pending cardiology clearance for possible LVOT.  - He is cleared from a pulmonary standpoint to undergo VATS.  - Recommend a total of 7 days of Abx for now. Based on findings on VATS, we will determine length of Abx.

## 2018-01-09 NOTE — PROGRESS NOTE ADULT - SUBJECTIVE AND OBJECTIVE BOX
Transfer Note General Medicine to CT Surgery    Hospital Course:  62M PMH HTN and MVP (Dx ) presented w/ SOB. Patient started feeling low energy around 2017, was given outpatient antibiotics for upper respiratory symptoms, but w/ lack of improvement was admitted to [facility] for PNA on , where he was given Abx (at least Zosyn).  received L chest tube w/ pigtail drain for pleural effusion. On  underwent tPA. There was discussion of VATS, but patient unhappy w/ facility so left AMA and came to [facility] where his mother has been a patient of Dr. Brown for Granulomatosis w/ Polyangiitis. Presented w/ SOB worse on exertion, nonproductive cough and pleuritic CP. In ED T 98.4, , /70, RR 18, O sat 97% on RA. WBC 10.6, labs otherwise unremarkable. CXR showing L pleural effusion nearly to mid lung, CT chest showing dense LLL consolidation w/ moderate layering loculated effusion containing air, consistent with possible gas-producing bacteria or bronchopleural fistula. Admitted to  [facility], Pulm consulted, and started on Vanc-Zosyn. CT surgery consulted w/ plan for VATS tomorrow. 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 Cardiac MRI performed, patient was cleared by cards for VATS. Transferred to CT surgery for further management.     OVERNIGHT EVENTS: MICHAEL    SUBJECTIVE / INTERVAL HPI: Patient seen and examined at bedside. He reports occasional dry cough/ SOB, otherwise no complaints. He denies any f/c, CP, or abdominal pain.     VITAL SIGNS:  Vital Signs Last 24 Hrs  T(C): 37.3 (2018 09:00), Max: 37.4 (2018 22:24)  T(F): 99.1 (2018 09:00), Max: 99.3 (2018 22:24)  HR: 88 (2018 09:00) (84 - 95)  BP: 138/78 (2018 09:00) (115/70 - 138/78)  BP(mean): --  RR: 19 (2018 09:00) (17 - 19)  SpO2: 97% (2018 09:00) (94% - 97%)    PHYSICAL EXAM:    General: WDWN, NAD  HEENT: NC/AT; MMM  Neck: supple  Cardiovascular: +S1/S2; RRR, 2/6 systolic murmur heard best at RUSB  Respiratory: +Decreased BS at L base. No w/r/r. Non-labored breathing.   Gastrointestinal: soft, NT/ND; No guarding.  Extremities: WWP; no edema or cyanosis      MEDICATIONS:  MEDICATIONS  (STANDING):  amLODIPine   Tablet 5 milliGRAM(s) Oral daily  guaiFENesin    Syrup 100 milliGRAM(s) Oral every 4 hours  heparin  Injectable 5000 Unit(s) SubCutaneous every 8 hours  metoprolol     tartrate 25 milliGRAM(s) Oral every 12 hours  piperacillin/tazobactam IVPB. 4.5 Gram(s) IV Intermittent every 6 hours  sodium chloride 0.9%. 1000 milliLiter(s) (125 mL/Hr) IV Continuous <Continuous>  vancomycin  IVPB 1500 milliGRAM(s) IV Intermittent every 12 hours    MEDICATIONS  (PRN):  acetaminophen   Tablet 650 milliGRAM(s) Oral every 6 hours PRN For Temp greater than 38 C (100.4 F)      ALLERGIES:  Allergies    No Known Drug Allergies  Seafood (Rash)    Intolerances    lactose (Vomiting)      LABS:                        11.2   8.9   )-----------( 423      ( 2018 06:43 )             35.3     -08    139  |  102  |  13  ----------------------------<  82  4.0   |  27  |  0.96    Ca    8.4      2018 06:41    TPro  6.0  /  Alb  2.6<L>  /  TBili  0.4  /  DBili  x   /  AST  22  /  ALT  50<H>  /  AlkPhos  93  01-08    PT/INR - ( 2018 15:16 )   PT: 12.6 sec;   INR: 1.13          PTT - ( 2018 15:16 )  PTT:27.0 sec  Urinalysis Basic - ( 2018 23:46 )    Color: Yellow / Appearance: Clear / S.010 / pH: x  Gluc: x / Ketone: NEGATIVE  / Bili: Negative / Urobili: 0.2 E.U./dL   Blood: x / Protein: NEGATIVE mg/dL / Nitrite: NEGATIVE   Leuk Esterase: NEGATIVE / RBC: < 5 /HPF / WBC < 5 /HPF   Sq Epi: x / Non Sq Epi: 0-5 /HPF / Bacteria: Present /HPF      RADIOLOGY & ADDITIONAL TESTS: Reviewed. Transfer Note General Medicine to CT Surgery    Hospital Course:  62M PMH HTN and MVP (Dx ) presented w/ SOB. Patient started feeling low energy around 2017, was given outpatient antibiotics for upper respiratory symptoms, but w/ lack of improvement was admitted to [facility] for PNA on , where he was given Abx (at least Zosyn).  received L chest tube w/ pigtail drain for pleural effusion. On  underwent tPA. There was discussion of VATS, but patient unhappy w/ facility so left AMA and came to [facility] where his mother has been a patient of Dr. Brown for Granulomatosis w/ Polyangiitis. Presented w/ SOB worse on exertion, nonproductive cough and pleuritic CP. In ED T 98.4, , /70, RR 18, O sat 97% on RA. WBC 10.6, labs otherwise unremarkable. CXR showing L pleural effusion nearly to mid lung, CT chest showing dense LLL consolidation w/ moderate layering loculated effusion containing air, consistent with possible gas-producing bacteria or bronchopleural fistula. Admitted to  [facility], Pulm consulted, and started on Vanc-Zosyn. CT surgery consulted w/ plan for VATS pending cardiac clearance. 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 Cardiac MRI performed, patient was cleared by cards for VATS. Transferred to CT surgery for further management.     OVERNIGHT EVENTS: MICHAEL    SUBJECTIVE / INTERVAL HPI: Patient seen and examined at bedside. He reports occasional dry cough/ SOB, otherwise no complaints. He denies any f/c, CP, or abdominal pain.     VITAL SIGNS:  Vital Signs Last 24 Hrs  T(C): 37.3 (2018 09:00), Max: 37.4 (2018 22:24)  T(F): 99.1 (2018 09:00), Max: 99.3 (2018 22:24)  HR: 88 (2018 09:00) (84 - 95)  BP: 138/78 (2018 09:00) (115/70 - 138/78)  BP(mean): --  RR: 19 (2018 09:00) (17 - 19)  SpO2: 97% (2018 09:00) (94% - 97%)    PHYSICAL EXAM:    General: WDWN, NAD  HEENT: NC/AT; MMM  Neck: supple  Cardiovascular: +S1/S2; RRR, 2/6 systolic murmur heard best at RUSB  Respiratory: +Decreased BS at L base. No w/r/r. Non-labored breathing.   Gastrointestinal: soft, NT/ND; No guarding.  Extremities: WWP; no edema or cyanosis      MEDICATIONS:  MEDICATIONS  (STANDING):  amLODIPine   Tablet 5 milliGRAM(s) Oral daily  guaiFENesin    Syrup 100 milliGRAM(s) Oral every 4 hours  heparin  Injectable 5000 Unit(s) SubCutaneous every 8 hours  metoprolol     tartrate 25 milliGRAM(s) Oral every 12 hours  piperacillin/tazobactam IVPB. 4.5 Gram(s) IV Intermittent every 6 hours  sodium chloride 0.9%. 1000 milliLiter(s) (125 mL/Hr) IV Continuous <Continuous>  vancomycin  IVPB 1500 milliGRAM(s) IV Intermittent every 12 hours    MEDICATIONS  (PRN):  acetaminophen   Tablet 650 milliGRAM(s) Oral every 6 hours PRN For Temp greater than 38 C (100.4 F)      ALLERGIES:  Allergies    No Known Drug Allergies  Seafood (Rash)    Intolerances    lactose (Vomiting)      LABS:                        11.2   8.9   )-----------( 423      ( 2018 06:43 )             35.3     -    139  |  102  |  13  ----------------------------<  82  4.0   |  27  |  0.96    Ca    8.4      2018 06:41    TPro  6.0  /  Alb  2.6<L>  /  TBili  0.4  /  DBili  x   /  AST  22  /  ALT  50<H>  /  AlkPhos  93  01-08    PT/INR - ( 2018 15:16 )   PT: 12.6 sec;   INR: 1.13          PTT - ( 2018 15:16 )  PTT:27.0 sec  Urinalysis Basic - ( 2018 23:46 )    Color: Yellow / Appearance: Clear / S.010 / pH: x  Gluc: x / Ketone: NEGATIVE  / Bili: Negative / Urobili: 0.2 E.U./dL   Blood: x / Protein: NEGATIVE mg/dL / Nitrite: NEGATIVE   Leuk Esterase: NEGATIVE / RBC: < 5 /HPF / WBC < 5 /HPF   Sq Epi: x / Non Sq Epi: 0-5 /HPF / Bacteria: Present /HPF      RADIOLOGY & ADDITIONAL TESTS: Reviewed.

## 2018-01-09 NOTE — PROGRESS NOTE ADULT - ASSESSMENT
A/P:  62 year old M PMHx HTN, MVP, presented to OSH with SOB and pleuritic chest pain, admitted for PNA, treated with IV abx, L chest tube inserted, failed TPA on 1/4/18.  Patient was unhappy with care at OSH, so he left AMA. Presented to [facility] ED, CXR and CT chest revealed  pleural effusion/empyema.  Thoracic surgery, Dr. Gauthier consulted and on 1/9/18 underwent uncomplicated L VATS RA decortication. Transferred to PACU.    Neurovascular: No delirium. Pain well controlled with current regimen.  -IV tylenol 10pm  -Morphine 2mg IV x 1 PRN    Cardiovascular: Hemodynamically stable. HR controlled.  -Hx HTN, MVP, followed by cardiology for LVOT/subaortic membrane pre-op- per Cardiology continue BB and IV fluid  -continue metoprolol 25mg q12, IVF 125cc/hr  -monitor HR/BP/tele    Respiratory: 02 Sat = 98% on2LNC  -Encourage C+DB and Use of IS 10x / hr while awake.  -CXR.    GI: Stable.  -NPO, ADAT  -continue famotidine for GI PPX.  -colace/senna bowel regimen    Renal / :  -Monitor renal function.  -Monitor I/O's.    Hematologic: H&H   -f/u AM CBC.    ID: PNA  -+sputum Gram + cocci- continue vanc/zosyn  -blood cx negative  -Observe for SIRS/Sepsis Syndrome.    Prophylaxis:  -DVT prophylaxis with 5000 SubQ Heparin q8h.  -SCD's    Disposition:  -Transfer to [facility] A/P:  62 year old M PMHx HTN, MVP, presented to OSH with SOB and pleuritic chest pain, admitted for PNA, treated with IV abx, L chest tube inserted, failed TPA on 1/4/18.  Patient was unhappy with care at OSH, so he left AMA. Presented to [facility] ED, CXR and CT chest revealed  pleural effusion/empyema.  Thoracic surgery, Dr. Gauthier consulted and on 1/9/18 underwent uncomplicated L VATS RA decortication. Transferred to PACU.    Neurovascular: No delirium. Pain well controlled with current regimen.  -IV tylenol 10pm  -Morphine 2mg IV x 1 PRN    Cardiovascular: Hemodynamically stable. HR controlled.  -Hx HTN, MVP, followed by cardiology for LVOT/subaortic membrane pre-op- per Cardiology continue BB and IV fluid, appreciate recs  -continue metoprolol 25mg q12, IVF 125cc/hr  -monitor HR/BP/tele    Respiratory: 02 Sat = 98% on2LNC  -s/p L VATS RA decortication secondary to empyema  -Encourage C+DB and Use of IS 10x / hr while awake.  -CXR- stable, f/u AM CXR  -Pulm following appreciated recs     GI: Stable.  -NPO, ADAT  -continue famotidine for GI PPX.  -colace/senna bowel regimen    Renal / : f/u Cr  -Monitor renal function.  -Monitor I/O's.  -+valverde    Hematologic: f/u H&H   -f/u AM CBC.    ID: PNA  -+sputum Gram + cocci- continue vanc/zosyn  -blood cx negative  -Observe for SIRS/Sepsis Syndrome.    Prophylaxis:  -DVT prophylaxis with 5000 SubQ Heparin q8h.  -SCD's    Disposition:  -Transfer to [facility]

## 2018-01-09 NOTE — PROGRESS NOTE ADULT - PROBLEM SELECTOR PLAN 7
F: no fluids needed  E: Replete electrolytes PRN K > 4, Mg > 2   N: Regular diet, NPO after midnight for VATS today

## 2018-01-09 NOTE — PROGRESS NOTE ADULT - SUBJECTIVE AND OBJECTIVE BOX
Seen in PACU      Operation / Date: 18 L VATS RA decortication    SUBJECTIVE ASSESSMENT:  62y Male seen and examined in PACU. Patient still a little groggy from anesthesia but responds appropriately.  Does report pain near chest tube incisions.  Denies fever, headache, AMS, chest pain, palpitations, SOB, abdominal pain, n/v.        Vital Signs Last 24 Hrs  T(C): 37.1 (2018 16:37), Max: 37.4 (2018 22:24)  T(F): 98.8 (2018 16:37), Max: 99.3 (2018 22:24)  HR: 86 (2018 16:52) (84 - 95)  BP: 120/75 (2018 16:52) (115/70 - 138/78)  BP(mean): 89 (2018 16:52) (89 - 90)  RR: 37 (2018 16:52) (17 - 37)  SpO2: 100% (2018 16:52) (94% - 100%)  I&O's Detail    2018 07:01  -  2018 07:00  --------------------------------------------------------  IN:  Total IN: 0 mL    OUT:    Voided: 1400 mL  Total OUT: 1400 mL    Total NET: -1400 mL      2018 07:01  -  2018 17:06  --------------------------------------------------------  IN:    sodium chloride 0.9%: 500 mL  Total IN: 500 mL    OUT:    Voided: 900 mL  Total OUT: 900 mL    Total NET: -400 mL          CHEST TUBE:  Yes, 3- anterior/posterior/basilar with 1/5 airleak to suction.    CLAIRE DRAIN:  No.  EPICARDIAL WIRES: No.  TIE DOWNS: Yes  HANSON: Yes    PHYSICAL EXAM:    General: Laying comfortably in bed, no acute distress     Neurological: awake alert oriented, no neuro deficits     Cardiovascular: S1S2, RRR, +systolic murmur RUSB    Respiratory: Ausc from anterior, coarse breathsounds b/l, good air entry    Gastrointestinal: +BS, soft nontender nondistended     Extremities: warm and well perfused, no edema, no calf tenderness    Vascular: 2+ radial and DP b/l    Incision Sites: L VATS: CDI. Chest tubes: CDI     LABS:                        11.2   8.9   )-----------( 423      ( 2018 06:43 )             35.3       COUMADIN:  No            139  |  102  |  13  ----------------------------<  82  4.0   |  27  |  0.96    Ca    8.4      2018 06:41    TPro  6.0  /  Alb  2.6<L>  /  TBili  0.4  /  DBili  x   /  AST  22  /  ALT  50<H>  /  AlkPhos  93        Urinalysis Basic - ( 2018 23:46 )    Color: Yellow / Appearance: Clear / S.010 / pH: x  Gluc: x / Ketone: NEGATIVE  / Bili: Negative / Urobili: 0.2 E.U./dL   Blood: x / Protein: NEGATIVE mg/dL / Nitrite: NEGATIVE   Leuk Esterase: NEGATIVE / RBC: < 5 /HPF / WBC < 5 /HPF   Sq Epi: x / Non Sq Epi: 0-5 /HPF / Bacteria: Present /HPF        MEDICATIONS  (STANDING):  acetaminophen  IVPB. 1000 milliGRAM(s) IV Intermittent once  BUpivacaine liposome 1.3% Injectable (no eMAR) 20 milliLiter(s) Local Injection once  docusate sodium 100 milliGRAM(s) Oral three times a day  famotidine    Tablet 20 milliGRAM(s) Oral daily  heparin  Injectable 5000 Unit(s) SubCutaneous every 8 hours  lactated ringers. 1000 milliLiter(s) (125 mL/Hr) IV Continuous <Continuous>  lidocaine   Patch 1 Patch Transdermal daily  metoprolol     tartrate 25 milliGRAM(s) Oral every 12 hours  piperacillin/tazobactam IVPB. 4.5 Gram(s) IV Intermittent every 6 hours  vancomycin  IVPB 1500 milliGRAM(s) IV Intermittent every 12 hours    MEDICATIONS  (PRN):  morphine  - Injectable 2 milliGRAM(s) IV Push once PRN breakthrough pain        RADIOLOGY & ADDITIONAL TESTS: Seen in PACU      Operation / Date: 18 L VATS RA decortication    SUBJECTIVE ASSESSMENT:  62y Male seen and examined in PACU. Patient still a little groggy from anesthesia but responds appropriately.  Does report pain near chest tube incisions.  Denies fever, headache, AMS, chest pain, palpitations, SOB, abdominal pain, n/v.        Vital Signs Last 24 Hrs  T(C): 37.1 (2018 16:37), Max: 37.4 (2018 22:24)  T(F): 98.8 (2018 16:37), Max: 99.3 (2018 22:24)  HR: 86 (2018 16:52) (84 - 95)  BP: 120/75 (2018 16:52) (115/70 - 138/78)  BP(mean): 89 (2018 16:52) (89 - 90)  RR: 37 (2018 16:52) (17 - 37)  SpO2: 100% (2018 16:52) (94% - 100%)  I&O's Detail    2018 07:01  -  2018 07:00  --------------------------------------------------------  IN:  Total IN: 0 mL    OUT:    Voided: 1400 mL  Total OUT: 1400 mL    Total NET: -1400 mL      2018 07:01  -  2018 17:06  --------------------------------------------------------  IN:    sodium chloride 0.9%: 500 mL  Total IN: 500 mL    OUT:    Voided: 900 mL  Total OUT: 900 mL    Total NET: -400 mL          CHEST TUBE:  Yes, 3- anterior/posterior/basilar with 1/5 airleak to suction.    CLAIRE DRAIN:  No.  EPICARDIAL WIRES: No.  TIE DOWNS: Yes  HANSON: Yes    PHYSICAL EXAM:    General: Laying comfortably in bed, no acute distress     Neurological: awake alert oriented, no neuro deficits     Cardiovascular: S1S2, RRR, +systolic murmur RUSB    Respiratory: Ausc from anterior, coarse breathsounds b/l, good air entry    Gastrointestinal: +BS, soft nontender nondistended     Extremities: warm and well perfused, no edema, no calf tenderness    Vascular: 2+ radial and DP b/l    Incision Sites: L VATS: CDI. Chest tubes: CDI     LABS:                        11.2   8.9   )-----------( 423      ( 2018 06:43 )             35.3       COUMADIN:  No            139  |  102  |  13  ----------------------------<  82  4.0   |  27  |  0.96    Ca    8.4      2018 06:41    TPro  6.0  /  Alb  2.6<L>  /  TBili  0.4  /  DBili  x   /  AST  22  /  ALT  50<H>  /  AlkPhos  93        Urinalysis Basic - ( 2018 23:46 )    Color: Yellow / Appearance: Clear / S.010 / pH: x  Gluc: x / Ketone: NEGATIVE  / Bili: Negative / Urobili: 0.2 E.U./dL   Blood: x / Protein: NEGATIVE mg/dL / Nitrite: NEGATIVE   Leuk Esterase: NEGATIVE / RBC: < 5 /HPF / WBC < 5 /HPF   Sq Epi: x / Non Sq Epi: 0-5 /HPF / Bacteria: Present /HPF        MEDICATIONS  (STANDING):  acetaminophen  IVPB. 1000 milliGRAM(s) IV Intermittent once  BUpivacaine liposome 1.3% Injectable (no eMAR) 20 milliLiter(s) Local Injection once  docusate sodium 100 milliGRAM(s) Oral three times a day  famotidine    Tablet 20 milliGRAM(s) Oral daily  heparin  Injectable 5000 Unit(s) SubCutaneous every 8 hours  lactated ringers. 1000 milliLiter(s) (125 mL/Hr) IV Continuous <Continuous>  lidocaine   Patch 1 Patch Transdermal daily  metoprolol     tartrate 25 milliGRAM(s) Oral every 12 hours  piperacillin/tazobactam IVPB. 4.5 Gram(s) IV Intermittent every 6 hours  vancomycin  IVPB 1500 milliGRAM(s) IV Intermittent every 12 hours    MEDICATIONS  (PRN):  morphine  - Injectable 2 milliGRAM(s) IV Push once PRN breakthrough pain        RADIOLOGY & ADDITIONAL TESTS:  < from: Xray Chest 1 View AP- PORTABLE-Urgent (18 @ 17:42) >    IMPRESSION:  Interval placement of 3 left-sided drains with slight interval   improvement of left pleural effusion.    < end of copied text >

## 2018-01-10 LAB
ANION GAP SERPL CALC-SCNC: 13 MMOL/L — SIGNIFICANT CHANGE UP (ref 5–17)
BASOPHILS NFR BLD AUTO: 0 % — SIGNIFICANT CHANGE UP (ref 0–2)
BUN SERPL-MCNC: 16 MG/DL — SIGNIFICANT CHANGE UP (ref 7–23)
CALCIUM SERPL-MCNC: 8.9 MG/DL — SIGNIFICANT CHANGE UP (ref 8.4–10.5)
CHLORIDE SERPL-SCNC: 97 MMOL/L — SIGNIFICANT CHANGE UP (ref 96–108)
CO2 SERPL-SCNC: 25 MMOL/L — SIGNIFICANT CHANGE UP (ref 22–31)
CREAT SERPL-MCNC: 0.88 MG/DL — SIGNIFICANT CHANGE UP (ref 0.5–1.3)
CULTURE RESULTS: NO GROWTH — SIGNIFICANT CHANGE UP
GLUCOSE SERPL-MCNC: 165 MG/DL — HIGH (ref 70–99)
HCT VFR BLD CALC: 37.2 % — LOW (ref 39–50)
HGB BLD-MCNC: 12.4 G/DL — LOW (ref 13–17)
LYMPHOCYTES # BLD AUTO: 6.3 % — LOW (ref 13–44)
MCHC RBC-ENTMCNC: 30.5 PG — SIGNIFICANT CHANGE UP (ref 27–34)
MCHC RBC-ENTMCNC: 33.3 G/DL — SIGNIFICANT CHANGE UP (ref 32–36)
MCV RBC AUTO: 91.4 FL — SIGNIFICANT CHANGE UP (ref 80–100)
MONOCYTES NFR BLD AUTO: 5.2 % — SIGNIFICANT CHANGE UP (ref 2–14)
NEUTROPHILS NFR BLD AUTO: 88.5 % — HIGH (ref 43–77)
NIGHT BLUE STAIN TISS: SIGNIFICANT CHANGE UP
PLATELET # BLD AUTO: 487 K/UL — HIGH (ref 150–400)
POTASSIUM SERPL-MCNC: 4.3 MMOL/L — SIGNIFICANT CHANGE UP (ref 3.5–5.3)
POTASSIUM SERPL-SCNC: 4.3 MMOL/L — SIGNIFICANT CHANGE UP (ref 3.5–5.3)
RBC # BLD: 4.07 M/UL — LOW (ref 4.2–5.8)
RBC # FLD: 12.6 % — SIGNIFICANT CHANGE UP (ref 10.3–16.9)
SODIUM SERPL-SCNC: 135 MMOL/L — SIGNIFICANT CHANGE UP (ref 135–145)
SPECIMEN SOURCE: SIGNIFICANT CHANGE UP
SPECIMEN SOURCE: SIGNIFICANT CHANGE UP
VANCOMYCIN TROUGH SERPL-MCNC: 17 UG/ML — SIGNIFICANT CHANGE UP (ref 10–20)
WBC # BLD: 13.1 K/UL — HIGH (ref 3.8–10.5)
WBC # FLD AUTO: 13.1 K/UL — HIGH (ref 3.8–10.5)

## 2018-01-10 PROCEDURE — 99232 SBSQ HOSP IP/OBS MODERATE 35: CPT

## 2018-01-10 PROCEDURE — 71045 X-RAY EXAM CHEST 1 VIEW: CPT | Mod: 26

## 2018-01-10 RX ORDER — ACETAMINOPHEN 500 MG
1000 TABLET ORAL ONCE
Qty: 0 | Refills: 0 | Status: COMPLETED | OUTPATIENT
Start: 2018-01-10 | End: 2018-01-10

## 2018-01-10 RX ADMIN — HEPARIN SODIUM 5000 UNIT(S): 5000 INJECTION INTRAVENOUS; SUBCUTANEOUS at 23:09

## 2018-01-10 RX ADMIN — Medication 400 MILLIGRAM(S): at 15:20

## 2018-01-10 RX ADMIN — PIPERACILLIN AND TAZOBACTAM 200 GRAM(S): 4; .5 INJECTION, POWDER, LYOPHILIZED, FOR SOLUTION INTRAVENOUS at 06:30

## 2018-01-10 RX ADMIN — Medication 300 MILLIGRAM(S): at 23:08

## 2018-01-10 RX ADMIN — LIDOCAINE 1 PATCH: 4 CREAM TOPICAL at 06:01

## 2018-01-10 RX ADMIN — LIDOCAINE 1 PATCH: 4 CREAM TOPICAL at 10:07

## 2018-01-10 RX ADMIN — HEPARIN SODIUM 5000 UNIT(S): 5000 INJECTION INTRAVENOUS; SUBCUTANEOUS at 06:10

## 2018-01-10 RX ADMIN — HEPARIN SODIUM 5000 UNIT(S): 5000 INJECTION INTRAVENOUS; SUBCUTANEOUS at 14:33

## 2018-01-10 RX ADMIN — Medication 400 MILLIGRAM(S): at 09:00

## 2018-01-10 RX ADMIN — FAMOTIDINE 20 MILLIGRAM(S): 10 INJECTION INTRAVENOUS at 10:07

## 2018-01-10 RX ADMIN — PIPERACILLIN AND TAZOBACTAM 200 GRAM(S): 4; .5 INJECTION, POWDER, LYOPHILIZED, FOR SOLUTION INTRAVENOUS at 01:30

## 2018-01-10 RX ADMIN — Medication 1000 MILLIGRAM(S): at 22:45

## 2018-01-10 RX ADMIN — PIPERACILLIN AND TAZOBACTAM 200 GRAM(S): 4; .5 INJECTION, POWDER, LYOPHILIZED, FOR SOLUTION INTRAVENOUS at 14:33

## 2018-01-10 RX ADMIN — Medication 100 MILLIGRAM(S): at 14:33

## 2018-01-10 RX ADMIN — Medication 100 MILLIGRAM(S): at 23:09

## 2018-01-10 RX ADMIN — Medication 400 MILLIGRAM(S): at 22:30

## 2018-01-10 RX ADMIN — Medication 25 MILLIGRAM(S): at 19:04

## 2018-01-10 RX ADMIN — PIPERACILLIN AND TAZOBACTAM 200 GRAM(S): 4; .5 INJECTION, POWDER, LYOPHILIZED, FOR SOLUTION INTRAVENOUS at 19:04

## 2018-01-10 RX ADMIN — LIDOCAINE 1 PATCH: 4 CREAM TOPICAL at 23:10

## 2018-01-10 RX ADMIN — SENNA PLUS 2 TABLET(S): 8.6 TABLET ORAL at 23:09

## 2018-01-10 RX ADMIN — Medication 1000 MILLIGRAM(S): at 15:45

## 2018-01-10 RX ADMIN — Medication 25 MILLIGRAM(S): at 06:10

## 2018-01-10 RX ADMIN — Medication 1000 MILLIGRAM(S): at 10:00

## 2018-01-10 RX ADMIN — Medication 300 MILLIGRAM(S): at 10:07

## 2018-01-10 NOTE — PROGRESS NOTE ADULT - PROBLEM SELECTOR PLAN 1
-S/P uncomplicated L VATS RA decortication. Doing well.  Patient with recent parapneumonic effusion/empyema with failed initial management with a chest tube and tPA x1.  - Patient had no relief of symptoms and no reported significant improvement in effusion size, so he left outside hospital and presented to [facility].   - On CT imaging, there is evidence of a large partially loculated left sided pleural effusion with multiple air locules likely due to previous chest tube placement  - given loculated nature of effusion, he underwent VATS   -Culture and gram stain sofar negative  -Optimize pain management, incentive spirometry, PT/OT, DVT PPX  -Chest tube management per CTS.  - On broad coverage that could be deescalated to Zosyn for now, with possible deescalation based on cultures. He needs atleast two weeks of ABX -S/P uncomplicated L VATS RA decortication. Doing well.  Patient with recent parapneumonic effusion/empyema with failed initial management with a chest tube and tPA x1.  - Patient had no relief of symptoms and no reported significant improvement in effusion size, so he left outside hospital and presented to [facility].   - On CT imaging, there is evidence of a large partially loculated left sided pleural effusion with multiple air locules likely due to previous chest tube placement  - given loculated nature of effusion, he underwent VATS   -Culture and gram stain sofar negative  -Optimize pain management, incentive spirometry, PT/OT, DVT PPX  -Chest tube management per CTS.  - On broad coverage with Zosyn and vanc, with possible deescalation based on cultures. He needs atleast two weeks of ABX

## 2018-01-10 NOTE — PROGRESS NOTE ADULT - SUBJECTIVE AND OBJECTIVE BOX
Interval Events:  Patient seen and examined at bedside. Mild tolerable chest pain. No fever or chills. Breathing better. He is sitting on chair.    REVIEW OF SYSTEMS:  Constitutional: No fever, weight loss or fatigue  Respiratory: As per subjective  Cardiovascular: No chest pain, palpitations, dizziness or leg swelling  Gastrointestinal: No abdominal or epigastric pain. No nausea, vomiting or hematemesis; No diarrhea or constipation. No melena or hematochezia.  Neurological: No headaches, memory loss, loss of strength, numbness or tremors  Skin: No itching, burning, rashes or lesions     MEDICATIONS:  Pulmonary:    Antimicrobials:  piperacillin/tazobactam IVPB. 4.5 Gram(s) IV Intermittent every 6 hours  vancomycin  IVPB 1500 milliGRAM(s) IV Intermittent every 12 hours    Anticoagulants:  heparin  Injectable 5000 Unit(s) SubCutaneous every 8 hours    Cardiac:  metoprolol     tartrate 25 milliGRAM(s) Oral every 12 hours      Allergies    No Known Drug Allergies  Seafood (Rash)    Intolerances    lactose (Vomiting)      Vital Signs Last 24 Hrs  T(C): 36.7 (10 Joaquín 2018 09:24), Max: 37.7 (2018 18:07)  T(F): 98.1 (10 Joaquín 2018 09:24), Max: 99.9 (2018 18:07)  HR: 70 (10 Joaquín 2018 09:32) (66 - 92)  BP: 127/73 (10 Joaquín 2018 09:32) (110/72 - 135/79)  BP(mean): 94 (10 Joaquín 2018 09:32) (83 - 96)  RR: 16 (10 Joaquín 2018 09:32) (7 - 30)  SpO2: 97% (10 Joaquín 2018 09:32) (97% - 100%)     @ :01  -  01-10 @ 07:00  --------------------------------------------------------  IN: 3145 mL / OUT: 1940 mL / NET: 1205 mL    01-10 @ 07:01  -  01-10 @ 10:37  --------------------------------------------------------  IN: 0 mL / OUT: 0 mL / NET: 0 mL          PHYSICAL EXAM:    General: Well developed; well nourished; in no acute distress  Eyes: PERRL, EOM intact; conjunctiva and sclera clear  ENMT: No nasal discharge; airway clear  Neck: Supple; non tender; no masses  Respiratory: Chest tube in situ. Minimal drainage. Clean surgical wound site. Has fairly good breath sound bilaterally.  Cardiovascular: Regular rate and rhythm. S1 and S2 Normal; No murmurs, gallops or rubs  Gastrointestinal: Soft non-tender non-distended; Normal bowel sounds  Extremities: Normal range of motion, No clubbing, cyanosis or edema  Neurological: Alert and oriented x3  Skin: Warm and dry. No obvious rash    LABS:      CBC Full  -  ( 10 Joaquín 2018 06:16 )  WBC Count : 13.1 K/uL  Hemoglobin : 12.4 g/dL  Hematocrit : 37.2 %  Platelet Count - Automated : 487 K/uL  Mean Cell Volume : 91.4 fL  Mean Cell Hemoglobin : 30.5 pg  Mean Cell Hemoglobin Concentration : 33.3 g/dL  Auto Neutrophil # : x  Auto Lymphocyte # : x  Auto Monocyte # : x  Auto Eosinophil # : x  Auto Basophil # : x  Auto Neutrophil % : 88.5 %  Auto Lymphocyte % : 6.3 %  Auto Monocyte % : 5.2 %  Auto Eosinophil % : x  Auto Basophil % : 0.0 %    01-10    135  |  97  |  16  ----------------------------<  165<H>  4.3   |  25  |  0.88    Ca    8.9      10 Joaquín 2018 06:16  Phos  4.0       Mg     2.1     -      PT/INR - ( 2018 19:51 )   PT: 12.7 sec;   INR: 1.14          PTT - ( 2018 19:51 )  PTT:29.5 sec      Urinalysis Basic - ( 2018 23:46 )    Color: Yellow / Appearance: Clear / S.010 / pH: x  Gluc: x / Ketone: NEGATIVE  / Bili: Negative / Urobili: 0.2 E.U./dL   Blood: x / Protein: NEGATIVE mg/dL / Nitrite: NEGATIVE   Leuk Esterase: NEGATIVE / RBC: < 5 /HPF / WBC < 5 /HPF   Sq Epi: x / Non Sq Epi: 0-5 /HPF / Bacteria: Present /HPF      RADIOLOGY & ADDITIONAL STUDIES (The following images were personally reviewed):< from: Xray Chest 1 View AP -PORTABLE-Routine (01.10.18 @ 06:44) >  Portable view of the chest is compared to 2018 and   demonstrates a chest tube is along the left upper lobe and mediastinum.   Midline trachea. Normal heart size. Improved aeration of the bilateral   lungs. Interval improvement in atelectasis and consolidation within the   left lower lobe with small residual layering left parapneumonic effusion.      < end of copied text > Interval Events:  Patient seen and examined at bedside. Mild tolerable chest pain. No fever or chills. Breathing better. He is sitting on chair.    REVIEW OF SYSTEMS:  Constitutional: No fever, weight loss or fatigue  Respiratory: As per subjective  Cardiovascular: No chest pain, palpitations, dizziness or leg swelling  Gastrointestinal: No abdominal or epigastric pain. No nausea, vomiting or hematemesis; No diarrhea or constipation. No melena or hematochezia.  Neurological: No headaches, memory loss, loss of strength, numbness or tremors  Skin: No itching, burning, rashes or lesions     MEDICATIONS:  Pulmonary:    Antimicrobials:  piperacillin/tazobactam IVPB. 4.5 Gram(s) IV Intermittent every 6 hours  vancomycin  IVPB 1500 milliGRAM(s) IV Intermittent every 12 hours    Anticoagulants:  heparin  Injectable 5000 Unit(s) SubCutaneous every 8 hours    Cardiac:  metoprolol     tartrate 25 milliGRAM(s) Oral every 12 hours      Allergies    No Known Drug Allergies  Seafood (Rash)    Intolerances    lactose (Vomiting)      Vital Signs Last 24 Hrs  T(C): 36.7 (10 Joaquín 2018 09:24), Max: 37.7 (2018 18:07)  T(F): 98.1 (10 Joaquín 2018 09:24), Max: 99.9 (2018 18:07)  HR: 70 (10 Joaquín 2018 09:32) (66 - 92)  BP: 127/73 (10 Joaquín 2018 09:32) (110/72 - 135/79)  BP(mean): 94 (10 Joaquín 2018 09:32) (83 - 96)  RR: 16 (10 Joaquín 2018 09:32) (7 - 30)  SpO2: 97% (10 Joaquín 2018 09:32) (97% - 100%)     @ :01  -  01-10 @ 07:00  --------------------------------------------------------  IN: 3145 mL / OUT: 1940 mL / NET: 1205 mL    01-10 @ 07:01  -  01-10 @ 10:37  --------------------------------------------------------  IN: 0 mL / OUT: 0 mL / NET: 0 mL          PHYSICAL EXAM:    General: Well developed; well nourished; in no acute distress  Eyes: PERRL, EOM intact; conjunctiva and sclera clear  ENMT: No nasal discharge; airway clear  Neck: Supple; non tender; no masses  Respiratory: Chest tube in situ. Minimal drainage. Clean surgical wound site. Bronchial breath sound in mid lung field, plus squeaks  Cardiovascular: Regular rate and rhythm. S1 and S2 Normal; No murmurs, gallops or rubs  Gastrointestinal: Soft non-tender non-distended; Normal bowel sounds  Extremities: Normal range of motion, No clubbing, cyanosis or edema  Neurological: Alert and oriented x3  Skin: Warm and dry. No obvious rash    LABS:      CBC Full  -  ( 10 Jaoquín 2018 06:16 )  WBC Count : 13.1 K/uL  Hemoglobin : 12.4 g/dL  Hematocrit : 37.2 %  Platelet Count - Automated : 487 K/uL  Mean Cell Volume : 91.4 fL  Mean Cell Hemoglobin : 30.5 pg  Mean Cell Hemoglobin Concentration : 33.3 g/dL  Auto Neutrophil # : x  Auto Lymphocyte # : x  Auto Monocyte # : x  Auto Eosinophil # : x  Auto Basophil # : x  Auto Neutrophil % : 88.5 %  Auto Lymphocyte % : 6.3 %  Auto Monocyte % : 5.2 %  Auto Eosinophil % : x  Auto Basophil % : 0.0 %    01-10    135  |  97  |  16  ----------------------------<  165<H>  4.3   |  25  |  0.88    Ca    8.9      10 Joaquín 2018 06:16  Phos  4.0     -  Mg     2.1     -09      PT/INR - ( 2018 19:51 )   PT: 12.7 sec;   INR: 1.14          PTT - ( 2018 19:51 )  PTT:29.5 sec      Urinalysis Basic - ( 2018 23:46 )    Color: Yellow / Appearance: Clear / S.010 / pH: x  Gluc: x / Ketone: NEGATIVE  / Bili: Negative / Urobili: 0.2 E.U./dL   Blood: x / Protein: NEGATIVE mg/dL / Nitrite: NEGATIVE   Leuk Esterase: NEGATIVE / RBC: < 5 /HPF / WBC < 5 /HPF   Sq Epi: x / Non Sq Epi: 0-5 /HPF / Bacteria: Present /HPF      RADIOLOGY & ADDITIONAL STUDIES (The following images were personally reviewed):< from: Xray Chest 1 View AP -PORTABLE-Routine (01.10.18 @ 06:44) >  Portable view of the chest is compared to 2018 and   demonstrates a chest tube is along the left upper lobe and mediastinum.   Midline trachea. Normal heart size. Improved aeration of the bilateral   lungs. Interval improvement in atelectasis and consolidation within the   left lower lobe with small residual layering left parapneumonic effusion.      < end of copied text > Interval Events:  Patient seen and examined at bedside. Mild tolerable chest pain. No fever or chills. Breathing better. He is sitting on chair.    REVIEW OF SYSTEMS:  Constitutional: No fever, weight loss or fatigue  Respiratory: As per subjective  Cardiovascular: No chest pain, palpitations, dizziness or leg swelling  Gastrointestinal: No abdominal or epigastric pain. No nausea, vomiting or hematemesis; No diarrhea or constipation. No melena or hematochezia.  Neurological: No headaches, memory loss, loss of strength, numbness or tremors  Skin: No itching, burning, rashes or lesions     MEDICATIONS:  Pulmonary:    Antimicrobials:  piperacillin/tazobactam IVPB. 4.5 Gram(s) IV Intermittent every 6 hours  vancomycin  IVPB 1500 milliGRAM(s) IV Intermittent every 12 hours    Anticoagulants:  heparin  Injectable 5000 Unit(s) SubCutaneous every 8 hours    Cardiac:  metoprolol     tartrate 25 milliGRAM(s) Oral every 12 hours      Allergies    No Known Drug Allergies  Seafood (Rash)    Intolerances    lactose (Vomiting)      Vital Signs Last 24 Hrs  T(C): 36.7 (10 Joaquín 2018 09:24), Max: 37.7 (2018 18:07)  T(F): 98.1 (10 Joaquín 2018 09:24), Max: 99.9 (2018 18:07)  HR: 70 (10 Joaquín 2018 09:32) (66 - 92)  BP: 127/73 (10 Joaquín 2018 09:32) (110/72 - 135/79)  BP(mean): 94 (10 Joaquín 2018 09:32) (83 - 96)  RR: 16 (10 Joaquín 2018 09:32) (7 - 30)  SpO2: 97% (10 Joaquín 2018 09:32) (97% - 100%)     @ :01  -  01-10 @ 07:00  --------------------------------------------------------  IN: 3145 mL / OUT: 1940 mL / NET: 1205 mL    01-10 @ 07:01  -  01-10 @ 10:37  --------------------------------------------------------  IN: 0 mL / OUT: 0 mL / NET: 0 mL          PHYSICAL EXAM:    General: Well developed; well nourished; in no acute distress  Eyes: PERRL, EOM intact; conjunctiva and sclera clear  ENMT: No nasal discharge; airway clear  Neck: Supple; non tender; no masses  Respiratory: Three(anterior, post and angled) Lt Chest tube in situ. Minimal drainage. Clean surgical wound site. Bronchial breath sound in mid lung field, plus squeaks  Cardiovascular: Regular rate and rhythm. S1 and S2 Normal; No murmurs, gallops or rubs  Gastrointestinal: Soft non-tender non-distended; Normal bowel sounds  Extremities: Normal range of motion, No clubbing, cyanosis or edema  Neurological: Alert and oriented x3  Skin: Warm and dry. No obvious rash    LABS:      CBC Full  -  ( 10 Joaquín 2018 06:16 )  WBC Count : 13.1 K/uL  Hemoglobin : 12.4 g/dL  Hematocrit : 37.2 %  Platelet Count - Automated : 487 K/uL  Mean Cell Volume : 91.4 fL  Mean Cell Hemoglobin : 30.5 pg  Mean Cell Hemoglobin Concentration : 33.3 g/dL  Auto Neutrophil # : x  Auto Lymphocyte # : x  Auto Monocyte # : x  Auto Eosinophil # : x  Auto Basophil # : x  Auto Neutrophil % : 88.5 %  Auto Lymphocyte % : 6.3 %  Auto Monocyte % : 5.2 %  Auto Eosinophil % : x  Auto Basophil % : 0.0 %    10    135  |  97  |  16  ----------------------------<  165<H>  4.3   |  25  |  0.88    Ca    8.9      10 Joaquín 2018 06:16  Phos  4.0     -09  Mg     2.1     -09      PT/INR - ( 2018 19:51 )   PT: 12.7 sec;   INR: 1.14          PTT - ( 2018 19:51 )  PTT:29.5 sec      Urinalysis Basic - ( 2018 23:46 )    Color: Yellow / Appearance: Clear / S.010 / pH: x  Gluc: x / Ketone: NEGATIVE  / Bili: Negative / Urobili: 0.2 E.U./dL   Blood: x / Protein: NEGATIVE mg/dL / Nitrite: NEGATIVE   Leuk Esterase: NEGATIVE / RBC: < 5 /HPF / WBC < 5 /HPF   Sq Epi: x / Non Sq Epi: 0-5 /HPF / Bacteria: Present /HPF      RADIOLOGY & ADDITIONAL STUDIES (The following images were personally reviewed):< from: Xray Chest 1 View AP -PORTABLE-Routine (01.10.18 @ 06:44) >  Portable view of the chest is compared to 2018 and   demonstrates a chest tube is along the left upper lobe and mediastinum.   Midline trachea. Normal heart size. Improved aeration of the bilateral   lungs. Interval improvement in atelectasis and consolidation within the   left lower lobe with small residual layering left parapneumonic effusion.      < end of copied text >

## 2018-01-10 NOTE — PROGRESS NOTE ADULT - ASSESSMENT
62 year old M PMHx HTN, MVP, presented to OSH with SOB and pleuritic chest pain, admitted for PNA, treated with IV abx, L chest tube inserted, failed TPA on 1/4/18.  Patient was unhappy with care at OSH, so he left AMA. Presented to [facility] ED, CXR and CT chest revealed  pleural effusion/empyema.  S/P uncomplicated L VATS RA decortication.

## 2018-01-10 NOTE — PROGRESS NOTE ADULT - SUBJECTIVE AND OBJECTIVE BOX
Chief Complaint/Reason for Consult: lvot obst  INTERVAL HPI: post op s complicaitons no eventso n tele  	  MEDICATIONS:  metoprolol     tartrate 25 milliGRAM(s) Oral every 12 hours    piperacillin/tazobactam IVPB. 4.5 Gram(s) IV Intermittent every 6 hours  vancomycin  IVPB 1500 milliGRAM(s) IV Intermittent every 12 hours      fentaNYL    Injectable 25 MICROGram(s) IV Push every 4 hours PRN    docusate sodium 100 milliGRAM(s) Oral three times a day  docusate sodium 100 milliGRAM(s) Oral three times a day  famotidine    Tablet 20 milliGRAM(s) Oral daily  senna 2 Tablet(s) Oral at bedtime      heparin  Injectable 5000 Unit(s) SubCutaneous every 8 hours  lactated ringers. 1000 milliLiter(s) IV Continuous <Continuous>  lidocaine   Patch 1 Patch Transdermal daily      REVIEW OF SYSTEMS:  [x] As per HPI  CONSTITUTIONAL: No fever, weight loss, or fatigue  RESPIRATORY: No cough, wheezing, chills or hemoptysis; No Shortness of Breath  CARDIOVASCULAR: No chest pain, palpitations, dizziness, or leg swelling  GASTROINTESTINAL: No abdominal or epigastric pain. No nausea, vomiting, or hematemesis; No diarrhea or constipation. No melena or hematochezia.  MUSCULOSKELETAL: No joint pain or swelling; No muscle, back, or extremity pain  [x] All others negative	  [ ] Unable to obtain    PHYSICAL EXAM:  T(C): 36.7 (01-10-18 @ 09:24), Max: 37.7 (01-09-18 @ 18:07)  HR: 74 (01-10-18 @ 13:41) (66 - 92)  BP: 110/66 (01-10-18 @ 13:41) (110/66 - 135/79)  RR: 15 (01-10-18 @ 13:41) (7 - 30)  SpO2: 96% (01-10-18 @ 13:41) (96% - 100%)  Wt(kg): --  I&O's Summary    09 Jan 2018 07:01  -  10 Joaquín 2018 07:00  --------------------------------------------------------  IN: 3145 mL / OUT: 1940 mL / NET: 1205 mL    10 Joaquín 2018 07:01  -  10 Joaquín 2018 14:04  --------------------------------------------------------  IN: 1125 mL / OUT: 75 mL / NET: 1050 mL          Appearance: Normal	  HEENT:   Normal oral mucosa  Cardiovascular: Normal S1 S2, No JVD, No murmurs, No edema  Respiratory: Lungs clear to auscultation	  Gastrointestinal:  Soft, Non-tender, + BS	  Extremities: Normal range of motion, No clubbing, cyanosis or edema  Vascular: Peripheral pulses palpable 2+ bilaterally    TELEMETRY: 	    ECG:   	  RADIOLOGY:   CXR:  CT:  US:    CARDIAC TESTING:  Echocardiogram:  Catheterization:  Stress Test:      LABS:	 	    CARDIAC MARKERS:                                  12.4   13.1  )-----------( 487      ( 10 Joaquín 2018 06:16 )             37.2     01-10    135  |  97  |  16  ----------------------------<  165<H>  4.3   |  25  |  0.88    Ca    8.9      10 Joaquín 2018 06:16  Phos  4.0     01-09  Mg     2.1     01-09      proBNP:   Lipid Profile:   HgA1c:   TSH:     ASSESSMENT/PLAN: 	    # LVOT - sub aortic membrane tolerating BB and IV hydration, no events on telemetry  consider repeat TTE to eval gradient prior to discharge  can peruse further [facility] as outpatient      #CV Prevention -   q3mo Fasting Lipid Profile, Goal LDL<100, statin as tolerated.  q6week TSH check  q3mo 25-OHD Vitamin D Level, Goal 50, supplement as tolerated

## 2018-01-10 NOTE — PROGRESS NOTE ADULT - SUBJECTIVE AND OBJECTIVE BOX
Patient discussed on morning rounds with Dr. Gauthier     Operation / Date: L VATS RA Decortication on 18    SUBJECTIVE ASSESSMENT:  62y Male seen at bedside this AM.  He feels well today but has some chest tube site pain that is improved with his current medications.  Denies any acute overnight events.  Denies HA, AMS, CP, palpitations, SOB, cough, hemoptysis, n/v/d, fever.     Vital Signs Last 24 Hrs  T(C): 36.7 (10 Joaquín 2018 09:24), Max: 37.7 (2018 18:07)  T(F): 98.1 (10 Joaquín 2018 09:24), Max: 99.9 (2018 18:07)  HR: 70 (10 Joaquín 2018 09:32) (66 - 92)  BP: 127/73 (10 Joaquín 2018 09:32) (110/72 - 135/79)  BP(mean): 94 (10 Joaquín 2018 09:32) (83 - 96)  RR: 16 (10 Joaquín 2018 09:32) (7 - 30)  SpO2: 97% (10 Joaquín 2018 09:32) (97% - 100%)  I&O's Detail    2018 07:01  -  10 Joaquín 2018 07:00  --------------------------------------------------------  IN:    IV PiggyBack: 900 mL    lactated ringers.: 1375 mL    Oral Fluid: 370 mL    sodium chloride 0.9%: 500 mL  Total IN: 3145 mL    OUT:    Chest Tube: 5 mL    Chest Tube: 210 mL    Chest Tube: 25 mL    Indwelling Catheter - Urethral: 800 mL    Voided: 900 mL  Total OUT: 1940 mL    Total NET: 1205 mL      10 Joaquín 2018 07:01  -  10 Joaquín 2018 13:37  --------------------------------------------------------  IN:    IV PiggyBack: 500 mL    lactated ringers.: 375 mL    Oral Fluid: 250 mL  Total IN: 1125 mL    OUT:    Indwelling Catheter - Urethral: 75 mL  Total OUT: 75 mL    Total NET: 1050 mL    CHEST TUBE:  Yes/No. AIR LEAKS: Yes/No. Suction / H2O SEAL.   CLAIRE DRAIN:  Yes/No.  EPICARDIAL WIRES: Yes/No.  TIE DOWNS: Yes/No.  HANSON: Yes/No.    PHYSICAL EXAM:  General:   Neurological:  Cardiovascular:  Respiratory:  Gastrointestinal:  Extremities:  Vascular:  Incision Sites:    LABS:                        12.4   13.1  )-----------( 487      ( 10 Joaquín 2018 06:16 )             37.2       COUMADIN:  Yes/No. REASON: .    PT/INR - ( 2018 19:51 )   PT: 12.7 sec;   INR: 1.14          PTT - ( 2018 19:51 )  PTT:29.5 sec    01-10    135  |  97  |  16  ----------------------------<  165<H>  4.3   |  25  |  0.88    Ca    8.9      10 Joaquín 2018 06:16  Phos  4.0       Mg     2.1             Urinalysis Basic - ( 2018 23:46 )    Color: Yellow / Appearance: Clear / S.010 / pH: x  Gluc: x / Ketone: NEGATIVE  / Bili: Negative / Urobili: 0.2 E.U./dL   Blood: x / Protein: NEGATIVE mg/dL / Nitrite: NEGATIVE   Leuk Esterase: NEGATIVE / RBC: < 5 /HPF / WBC < 5 /HPF   Sq Epi: x / Non Sq Epi: 0-5 /HPF / Bacteria: Present /HPF        MEDICATIONS  (STANDING):  BUpivacaine liposome 1.3% Injectable (no eMAR) 20 milliLiter(s) Local Injection once  docusate sodium 100 milliGRAM(s) Oral three times a day  docusate sodium 100 milliGRAM(s) Oral three times a day  famotidine    Tablet 20 milliGRAM(s) Oral daily  heparin  Injectable 5000 Unit(s) SubCutaneous every 8 hours  lactated ringers. 1000 milliLiter(s) (125 mL/Hr) IV Continuous <Continuous>  lidocaine   Patch 1 Patch Transdermal daily  metoprolol     tartrate 25 milliGRAM(s) Oral every 12 hours  piperacillin/tazobactam IVPB. 4.5 Gram(s) IV Intermittent every 6 hours  senna 2 Tablet(s) Oral at bedtime  vancomycin  IVPB 1500 milliGRAM(s) IV Intermittent every 12 hours    MEDICATIONS  (PRN):  fentaNYL    Injectable 25 MICROGram(s) IV Push every 4 hours PRN Severe Pain (7 - 10)      RADIOLOGY & ADDITIONAL TESTS: Patient discussed on morning rounds with Dr. Gauthier     Operation / Date: L VATS RA Decortication on 18    SUBJECTIVE ASSESSMENT:  62y Male seen at bedside this AM.  He feels well today but has some chest tube site pain that is improved with his current medications.  Denies any acute overnight events.  Denies HA, AMS, CP, palpitations, SOB, cough, hemoptysis, n/v/d, fever.     Vital Signs Last 24 Hrs  T(C): 36.7 (10 Joaquín 2018 09:24), Max: 37.7 (2018 18:07)  T(F): 98.1 (10 Joaquín 2018 09:24), Max: 99.9 (2018 18:07)  HR: 70 (10 Joaquín 2018 09:32) (66 - 92)  BP: 127/73 (10 Joaquín 2018 09:32) (110/72 - 135/79)  BP(mean): 94 (10 Joaquín 2018 09:32) (83 - 96)  RR: 16 (10 Joaquín 2018 09:32) (7 - 30)  SpO2: 97% (10 Joaquín 2018 09:32) (97% - 100%)  I&O's Detail    2018 07:01  -  10 Joaquín 2018 07:00  --------------------------------------------------------  IN:    IV PiggyBack: 900 mL    lactated ringers.: 1375 mL    Oral Fluid: 370 mL    sodium chloride 0.9%: 500 mL  Total IN: 3145 mL    OUT:    Chest Tube: 5 mL    Chest Tube: 210 mL    Chest Tube: 25 mL    Indwelling Catheter - Urethral: 800 mL    Voided: 900 mL  Total OUT: 1940 mL    Total NET: 1205 mL      10 Joaquín 2018 07:01  -  10 Joaquín 2018 13:37  --------------------------------------------------------  IN:    IV PiggyBack: 500 mL    lactated ringers.: 375 mL    Oral Fluid: 250 mL  Total IN: 1125 mL    OUT:    Indwelling Catheter - Urethral: 75 mL  Total OUT: 75 mL    Total NET: 1050 mL    CHEST TUBE:  Yes x 3, no air leak on suction  CLAIRE DRAIN:  No.  EPICARDIAL WIRES: No.  TIE DOWNS: Yes.  HANSON: Yes    PHYSICAL EXAM:  General: OOB to chair, no acute distress.   Neurological: AAOx3, no AMS or focal deficits.   Cardiovascular: RRR, S1/S2, +Systolic murmur at apex, no r/g.   Respiratory: No acute distress on 3L NC.  3 Chest tubes in place on suction on L lateral chest wall.  No palpable crepitus, audible rhonchi in L lung fields, no w/r.   Gastrointestinal: ND, NBS, non-TTP  Extremities: Warm and well perfused, no calf ttp or edema b/l.   Vascular: Pulses 2+  Incision Sites:    LABS:                        12.4   13.1  )-----------( 487      ( 10 Joaquín 2018 06:16 )             37.2       COUMADIN:  No  PT/INR - ( 2018 19:51 )   PT: 12.7 sec;   INR: 1.14          PTT - ( 2018 19:51 )  PTT:29.5 sec    01-10    135  |  97  |  16  ----------------------------<  165<H>  4.3   |  25  |  0.88    Ca    8.9      10 Joaquín 2018 06:16  Phos  4.0       Mg     2.1     -09    Urinalysis Basic - ( 2018 23:46 )    Color: Yellow / Appearance: Clear / S.010 / pH: x  Gluc: x / Ketone: NEGATIVE  / Bili: Negative / Urobili: 0.2 E.U./dL   Blood: x / Protein: NEGATIVE mg/dL / Nitrite: NEGATIVE   Leuk Esterase: NEGATIVE / RBC: < 5 /HPF / WBC < 5 /HPF   Sq Epi: x / Non Sq Epi: 0-5 /HPF / Bacteria: Present /HPF    MEDICATIONS  (STANDING):  BUpivacaine liposome 1.3% Injectable (no eMAR) 20 milliLiter(s) Local Injection once  docusate sodium 100 milliGRAM(s) Oral three times a day  docusate sodium 100 milliGRAM(s) Oral three times a day  famotidine    Tablet 20 milliGRAM(s) Oral daily  heparin  Injectable 5000 Unit(s) SubCutaneous every 8 hours  lactated ringers. 1000 milliLiter(s) (125 mL/Hr) IV Continuous <Continuous>  lidocaine   Patch 1 Patch Transdermal daily  metoprolol     tartrate 25 milliGRAM(s) Oral every 12 hours  piperacillin/tazobactam IVPB. 4.5 Gram(s) IV Intermittent every 6 hours  senna 2 Tablet(s) Oral at bedtime  vancomycin  IVPB 1500 milliGRAM(s) IV Intermittent every 12 hours    MEDICATIONS  (PRN):  fentaNYL    Injectable 25 MICROGram(s) IV Push every 4 hours PRN Severe Pain (7 - 10)      RADIOLOGY & ADDITIONAL TESTS:  < from: Xray Chest 1 View AP -PORTABLE-Routine (01.10.18 @ 06:44) >    EXAM:  XR CHEST PORTABLE  ROUTINE 1V                          PROCEDURE DATE:  01/10/2018         INTERPRETATION:  CLINICAL INDICATION: 62 year-old post VATS procedure.      FINDINGS: Portable view of the chest is compared to 2018 and   demonstrates a chest tube is along the left upper lobe and mediastinum.   Midline trachea. Normal heart size. Improved aeration of the bilateral   lungs. Interval improvement in atelectasis and consolidation within the   left lower lobe with small residual layering left parapneumonic effusion.      < end of copied text > Patient discussed on morning rounds with Dr. Gauthier     Operation / Date: L VATS RA Decortication on 18    SUBJECTIVE ASSESSMENT:  62y Male seen at bedside this AM.  He feels well today but has some chest tube site pain that is improved with his current medications.  Denies any acute overnight events.  Denies HA, AMS, CP, palpitations, SOB, cough, hemoptysis, n/v/d, fever.     Vital Signs Last 24 Hrs  T(C): 36.7 (10 Joaquín 2018 09:24), Max: 37.7 (2018 18:07)  T(F): 98.1 (10 Joaquín 2018 09:24), Max: 99.9 (2018 18:07)  HR: 70 (10 Joaquín 2018 09:32) (66 - 92)  BP: 127/73 (10 Joaquín 2018 09:32) (110/72 - 135/79)  BP(mean): 94 (10 Joaquín 2018 09:32) (83 - 96)  RR: 16 (10 Joaquín 2018 09:32) (7 - 30)  SpO2: 97% (10 Joaquín 2018 09:32) (97% - 100%)  I&O's Detail    2018 07:01  -  10 Joaquín 2018 07:00  --------------------------------------------------------  IN:    IV PiggyBack: 900 mL    lactated ringers.: 1375 mL    Oral Fluid: 370 mL    sodium chloride 0.9%: 500 mL  Total IN: 3145 mL    OUT:    Chest Tube: 5 mL    Chest Tube: 210 mL    Chest Tube: 25 mL    Indwelling Catheter - Urethral: 800 mL    Voided: 900 mL  Total OUT: 1940 mL    Total NET: 1205 mL      10 Joaquín 2018 07:01  -  10 Joaquín 2018 13:37  --------------------------------------------------------  IN:    IV PiggyBack: 500 mL    lactated ringers.: 375 mL    Oral Fluid: 250 mL  Total IN: 1125 mL    OUT:    Indwelling Catheter - Urethral: 75 mL  Total OUT: 75 mL    Total NET: 1050 mL    CHEST TUBE:  Yes x 3, no air leak on suction  CLAIRE DRAIN:  No.  EPICARDIAL WIRES: No.  TIE DOWNS: Yes.  HANSON: Yes    PHYSICAL EXAM:  General: OOB to chair, no acute distress.   Neurological: AAOx3, no AMS or focal deficits.   Cardiovascular: RRR, S1/S2, +Systolic murmur at apex, no r/g.   Respiratory: No acute distress on 3L NC.  3 Chest tubes in place on suction on L lateral chest wall.  No palpable crepitus, audible rhonchi in L lung fields, no w/r.   Gastrointestinal: ND, NBS, non-TTP  Extremities: Warm and well perfused, no calf ttp or edema b/l.   Vascular: Pulses 2+  Incision Sites: L VATS: C/D/I, no palpable crepitus.     LABS:                        12.4   13.1  )-----------( 487      ( 10 Joaquín 2018 06:16 )             37.2       COUMADIN:  No  PT/INR - ( 2018 19:51 )   PT: 12.7 sec;   INR: 1.14          PTT - ( 2018 19:51 )  PTT:29.5 sec    01-10    135  |  97  |  16  ----------------------------<  165<H>  4.3   |  25  |  0.88    Ca    8.9      10 Joaquín 2018 06:16  Phos  4.0       Mg     2.1         Urinalysis Basic - ( 2018 23:46 )    Color: Yellow / Appearance: Clear / S.010 / pH: x  Gluc: x / Ketone: NEGATIVE  / Bili: Negative / Urobili: 0.2 E.U./dL   Blood: x / Protein: NEGATIVE mg/dL / Nitrite: NEGATIVE   Leuk Esterase: NEGATIVE / RBC: < 5 /HPF / WBC < 5 /HPF   Sq Epi: x / Non Sq Epi: 0-5 /HPF / Bacteria: Present /HPF    MEDICATIONS  (STANDING):  BUpivacaine liposome 1.3% Injectable (no eMAR) 20 milliLiter(s) Local Injection once  docusate sodium 100 milliGRAM(s) Oral three times a day  docusate sodium 100 milliGRAM(s) Oral three times a day  famotidine    Tablet 20 milliGRAM(s) Oral daily  heparin  Injectable 5000 Unit(s) SubCutaneous every 8 hours  lactated ringers. 1000 milliLiter(s) (125 mL/Hr) IV Continuous <Continuous>  lidocaine   Patch 1 Patch Transdermal daily  metoprolol     tartrate 25 milliGRAM(s) Oral every 12 hours  piperacillin/tazobactam IVPB. 4.5 Gram(s) IV Intermittent every 6 hours  senna 2 Tablet(s) Oral at bedtime  vancomycin  IVPB 1500 milliGRAM(s) IV Intermittent every 12 hours    MEDICATIONS  (PRN):  fentaNYL    Injectable 25 MICROGram(s) IV Push every 4 hours PRN Severe Pain (7 - 10)      RADIOLOGY & ADDITIONAL TESTS:  < from: Xray Chest 1 View AP -PORTABLE-Routine (01.10.18 @ 06:44) >    EXAM:  XR CHEST PORTABLE  ROUTINE 1V                          PROCEDURE DATE:  01/10/2018         INTERPRETATION:  CLINICAL INDICATION: 62 year-old post VATS procedure.      FINDINGS: Portable view of the chest is compared to 2018 and   demonstrates a chest tube is along the left upper lobe and mediastinum.   Midline trachea. Normal heart size. Improved aeration of the bilateral   lungs. Interval improvement in atelectasis and consolidation within the   left lower lobe with small residual layering left parapneumonic effusion.      < end of copied text >

## 2018-01-10 NOTE — PROGRESS NOTE ADULT - ASSESSMENT
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  Pt left that hospital AMA due to his dissatisfaction with care provided and presented to [facility] ED on 1/7/18    62 year old M PMHx HTN, MVP, presented to OSH with SOB and pleuritic chest pain, admitted for PNA, treated with IV abx, L chest tube inserted, failed TPA on 1/4/18.  Patient was unhappy with care at OSH, so he left AMA. Presented to [facility] ED, CXR and CT chest revealed  pleural effusion/empyema.  Thoracic surgery, Dr. Gauthier consulted and on 1/9/18 underwent uncomplicated L VATS RA decortication. Transferred to PACU. 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  Pt left that hospital AMA due to his dissatisfaction with care provided and presented to [facility] ED on 1/7/18 and was found to have empyema with L pleural effusion.  Dr. Gauthier was consulted and he underwent uncomplicated L VATS RA Decortication on 1/9/18.  He arrived extubated to PACU with 3 chest tubes in place on suction, later transferred to [facility] for step down care.  No acute issues overnight, now POD 1.     Neurovascular: No delirium, pain well controlled on current regimen.  -C/w PRNs for pain control    Respiratory: Saturating well on 3L NC; admitted with empyema 2/2 pna; now POD 1 s/p above procedure  -3 chest tubes in place on suction, no air leak appreciated.   -AM CXR stable, repeat in AM  -Encourage IS 10x/hour while awake; C+DB; Ambulation  -Monitor SpO2 for respiratory status    Cardiovascular: HD Stable, HR controlled.   -MVP with LVOT 2/2 subaortic membrane on ECHO.    -Cardiology following, continue to appreciate recommendations.   -C/w Lopressor 25mg q12h  -Monitor HR/BP/Tele    GI: Stable, tolerating PO  -GI PPX: Pepcid   -Colace and senna for bowel regimen    /Renal: BUN/Cr: 16/0.88; No urinary issues  -Joya out, +TOV   -Trend AM Cr  -Monitor I/O    ID: Afebrile, Asymptomatic  -WCC 13.1, continue to monitor.   -C/w Vanc/Zosyn.    -Continue to monitor for SIRS    Endo:   -No acute issues at this time    Heme: H/H Stable  -DVT PPX: HSQ 5000 q8h and SCDs  -CBC, chem in AM    Dispo: Home when medically ready.

## 2018-01-11 LAB
ANION GAP SERPL CALC-SCNC: 12 MMOL/L — SIGNIFICANT CHANGE UP (ref 5–17)
ANION GAP SERPL CALC-SCNC: 13 MMOL/L — SIGNIFICANT CHANGE UP (ref 5–17)
ANION GAP SERPL CALC-SCNC: 14 MMOL/L — SIGNIFICANT CHANGE UP (ref 5–17)
APTT BLD: 31.4 SEC — SIGNIFICANT CHANGE UP (ref 27.5–37.4)
BUN SERPL-MCNC: 23 MG/DL — SIGNIFICANT CHANGE UP (ref 7–23)
BUN SERPL-MCNC: 23 MG/DL — SIGNIFICANT CHANGE UP (ref 7–23)
BUN SERPL-MCNC: 25 MG/DL — HIGH (ref 7–23)
CALCIUM SERPL-MCNC: 8.4 MG/DL — SIGNIFICANT CHANGE UP (ref 8.4–10.5)
CALCIUM SERPL-MCNC: 8.6 MG/DL — SIGNIFICANT CHANGE UP (ref 8.4–10.5)
CALCIUM SERPL-MCNC: 8.7 MG/DL — SIGNIFICANT CHANGE UP (ref 8.4–10.5)
CHLORIDE SERPL-SCNC: 100 MMOL/L — SIGNIFICANT CHANGE UP (ref 96–108)
CHLORIDE SERPL-SCNC: 101 MMOL/L — SIGNIFICANT CHANGE UP (ref 96–108)
CHLORIDE SERPL-SCNC: 98 MMOL/L — SIGNIFICANT CHANGE UP (ref 96–108)
CO2 SERPL-SCNC: 25 MMOL/L — SIGNIFICANT CHANGE UP (ref 22–31)
CO2 SERPL-SCNC: 26 MMOL/L — SIGNIFICANT CHANGE UP (ref 22–31)
CO2 SERPL-SCNC: 26 MMOL/L — SIGNIFICANT CHANGE UP (ref 22–31)
CREAT SERPL-MCNC: 1.44 MG/DL — HIGH (ref 0.5–1.3)
CREAT SERPL-MCNC: 1.53 MG/DL — HIGH (ref 0.5–1.3)
CREAT SERPL-MCNC: 1.6 MG/DL — HIGH (ref 0.5–1.3)
GLUCOSE SERPL-MCNC: 104 MG/DL — HIGH (ref 70–99)
GLUCOSE SERPL-MCNC: 105 MG/DL — HIGH (ref 70–99)
GLUCOSE SERPL-MCNC: 162 MG/DL — HIGH (ref 70–99)
HCT VFR BLD CALC: 36 % — LOW (ref 39–50)
HGB BLD-MCNC: 11.8 G/DL — LOW (ref 13–17)
INR BLD: 1.07 — SIGNIFICANT CHANGE UP (ref 0.88–1.16)
MAGNESIUM SERPL-MCNC: 2.1 MG/DL — SIGNIFICANT CHANGE UP (ref 1.6–2.6)
MCHC RBC-ENTMCNC: 29.6 PG — SIGNIFICANT CHANGE UP (ref 27–34)
MCHC RBC-ENTMCNC: 32.8 G/DL — SIGNIFICANT CHANGE UP (ref 32–36)
MCV RBC AUTO: 90.2 FL — SIGNIFICANT CHANGE UP (ref 80–100)
PLATELET # BLD AUTO: 443 K/UL — HIGH (ref 150–400)
POTASSIUM SERPL-MCNC: 3.9 MMOL/L — SIGNIFICANT CHANGE UP (ref 3.5–5.3)
POTASSIUM SERPL-MCNC: 3.9 MMOL/L — SIGNIFICANT CHANGE UP (ref 3.5–5.3)
POTASSIUM SERPL-MCNC: 4 MMOL/L — SIGNIFICANT CHANGE UP (ref 3.5–5.3)
POTASSIUM SERPL-SCNC: 3.9 MMOL/L — SIGNIFICANT CHANGE UP (ref 3.5–5.3)
POTASSIUM SERPL-SCNC: 3.9 MMOL/L — SIGNIFICANT CHANGE UP (ref 3.5–5.3)
POTASSIUM SERPL-SCNC: 4 MMOL/L — SIGNIFICANT CHANGE UP (ref 3.5–5.3)
PROTHROM AB SERPL-ACNC: 11.9 SEC — SIGNIFICANT CHANGE UP (ref 9.8–12.7)
RBC # BLD: 3.99 M/UL — LOW (ref 4.2–5.8)
RBC # FLD: 12.9 % — SIGNIFICANT CHANGE UP (ref 10.3–16.9)
SODIUM SERPL-SCNC: 136 MMOL/L — SIGNIFICANT CHANGE UP (ref 135–145)
SODIUM SERPL-SCNC: 139 MMOL/L — SIGNIFICANT CHANGE UP (ref 135–145)
SODIUM SERPL-SCNC: 140 MMOL/L — SIGNIFICANT CHANGE UP (ref 135–145)
VANCOMYCIN TROUGH SERPL-MCNC: 12 UG/ML — SIGNIFICANT CHANGE UP (ref 10–20)
WBC # BLD: 7.9 K/UL — SIGNIFICANT CHANGE UP (ref 3.8–10.5)
WBC # FLD AUTO: 7.9 K/UL — SIGNIFICANT CHANGE UP (ref 3.8–10.5)

## 2018-01-11 PROCEDURE — 99232 SBSQ HOSP IP/OBS MODERATE 35: CPT

## 2018-01-11 PROCEDURE — 71045 X-RAY EXAM CHEST 1 VIEW: CPT | Mod: 26

## 2018-01-11 RX ORDER — BENZOCAINE AND MENTHOL 5; 1 G/100ML; G/100ML
1 LIQUID ORAL
Qty: 0 | Refills: 0 | Status: DISCONTINUED | OUTPATIENT
Start: 2018-01-11 | End: 2018-01-13

## 2018-01-11 RX ORDER — PIPERACILLIN AND TAZOBACTAM 4; .5 G/20ML; G/20ML
4.5 INJECTION, POWDER, LYOPHILIZED, FOR SOLUTION INTRAVENOUS EVERY 6 HOURS
Qty: 0 | Refills: 0 | Status: DISCONTINUED | OUTPATIENT
Start: 2018-01-11 | End: 2018-01-13

## 2018-01-11 RX ORDER — SODIUM CHLORIDE 9 MG/ML
1000 INJECTION, SOLUTION INTRAVENOUS
Qty: 0 | Refills: 0 | Status: DISCONTINUED | OUTPATIENT
Start: 2018-01-11 | End: 2018-01-13

## 2018-01-11 RX ORDER — ACETAMINOPHEN 500 MG
650 TABLET ORAL EVERY 6 HOURS
Qty: 0 | Refills: 0 | Status: DISCONTINUED | OUTPATIENT
Start: 2018-01-11 | End: 2018-01-13

## 2018-01-11 RX ADMIN — HEPARIN SODIUM 5000 UNIT(S): 5000 INJECTION INTRAVENOUS; SUBCUTANEOUS at 06:42

## 2018-01-11 RX ADMIN — SENNA PLUS 2 TABLET(S): 8.6 TABLET ORAL at 21:58

## 2018-01-11 RX ADMIN — BENZOCAINE AND MENTHOL 1 LOZENGE: 5; 1 LIQUID ORAL at 12:42

## 2018-01-11 RX ADMIN — Medication 650 MILLIGRAM(S): at 18:32

## 2018-01-11 RX ADMIN — HEPARIN SODIUM 5000 UNIT(S): 5000 INJECTION INTRAVENOUS; SUBCUTANEOUS at 21:57

## 2018-01-11 RX ADMIN — PIPERACILLIN AND TAZOBACTAM 200 GRAM(S): 4; .5 INJECTION, POWDER, LYOPHILIZED, FOR SOLUTION INTRAVENOUS at 12:24

## 2018-01-11 RX ADMIN — Medication 300 MILLIGRAM(S): at 07:13

## 2018-01-11 RX ADMIN — LIDOCAINE 1 PATCH: 4 CREAM TOPICAL at 12:25

## 2018-01-11 RX ADMIN — Medication 25 MILLIGRAM(S): at 06:42

## 2018-01-11 RX ADMIN — Medication 100 MILLIGRAM(S): at 06:42

## 2018-01-11 RX ADMIN — LIDOCAINE 1 PATCH: 4 CREAM TOPICAL at 23:58

## 2018-01-11 RX ADMIN — Medication 650 MILLIGRAM(S): at 12:57

## 2018-01-11 RX ADMIN — PIPERACILLIN AND TAZOBACTAM 200 GRAM(S): 4; .5 INJECTION, POWDER, LYOPHILIZED, FOR SOLUTION INTRAVENOUS at 03:00

## 2018-01-11 RX ADMIN — PIPERACILLIN AND TAZOBACTAM 200 GRAM(S): 4; .5 INJECTION, POWDER, LYOPHILIZED, FOR SOLUTION INTRAVENOUS at 18:32

## 2018-01-11 RX ADMIN — Medication 650 MILLIGRAM(S): at 18:58

## 2018-01-11 RX ADMIN — BENZOCAINE AND MENTHOL 1 LOZENGE: 5; 1 LIQUID ORAL at 18:32

## 2018-01-11 RX ADMIN — Medication 100 MILLIGRAM(S): at 21:58

## 2018-01-11 RX ADMIN — Medication 650 MILLIGRAM(S): at 12:24

## 2018-01-11 RX ADMIN — HEPARIN SODIUM 5000 UNIT(S): 5000 INJECTION INTRAVENOUS; SUBCUTANEOUS at 16:07

## 2018-01-11 RX ADMIN — FAMOTIDINE 20 MILLIGRAM(S): 10 INJECTION INTRAVENOUS at 12:24

## 2018-01-11 RX ADMIN — Medication 25 MILLIGRAM(S): at 18:32

## 2018-01-11 NOTE — CHART NOTE - NSCHARTNOTEFT_GEN_A_CORE
Patient was seen and evaluated at the bedside. 3 chest tubes in place, all with minimal drainage and no air leak. The anterior chest tube drained 45cc. The tube was removed without complication and the patient tolerated the procedure well. A tie down was left in place and redressed. Post procedural chest xray was stable.

## 2018-01-11 NOTE — PROGRESS NOTE ADULT - SUBJECTIVE AND OBJECTIVE BOX
Interval Events:  Patient seen and examined at bedside. Stable breathing , denies chest pain or palpitation. No fever or chills.     REVIEW OF SYSTEMS:  Constitutional: No fever, weight loss or fatigue  Respiratory: As per subjective  Cardiovascular: No chest pain, palpitations, dizziness or leg swelling  Gastrointestinal: No abdominal or epigastric pain. No nausea, vomiting or hematemesis; No diarrhea or constipation. No melena or hematochezia.  Neurological: No headaches, memory loss, loss of strength, numbness or tremors  Skin: No itching, burning, rashes or lesions     MEDICATIONS:  Pulmonary:    Antimicrobials:  piperacillin/tazobactam IVPB. 4.5 Gram(s) IV Intermittent every 6 hours    Anticoagulants:  heparin  Injectable 5000 Unit(s) SubCutaneous every 8 hours    Cardiac:  metoprolol     tartrate 25 milliGRAM(s) Oral every 12 hours      Allergies    No Known Drug Allergies  Seafood (Rash)    Intolerances    lactose (Vomiting)      Vital Signs Last 24 Hrs  T(C): 36.5 (11 Jan 2018 14:32), Max: 37.2 (10 Joaquín 2018 22:54)  T(F): 97.7 (11 Jan 2018 14:32), Max: 98.9 (10 Joaquín 2018 22:54)  HR: 88 (11 Jan 2018 16:50) (72 - 99)  BP: 119/73 (11 Jan 2018 16:50) (107/62 - 151/85)  BP(mean): 103 (11 Jan 2018 05:17) (75 - 103)  RR: 14 (11 Jan 2018 16:50) (14 - 16)  SpO2: 98% (11 Jan 2018 16:50) (95% - 98%)    01-10 @ 07:01 - 01-11 @ 07:00  --------------------------------------------------------  IN: 3180 mL / OUT: 2313 mL / NET: 867 mL    01-11 @ 07:01 - 01-11 @ 17:00  --------------------------------------------------------  IN: 100 mL / OUT: 1737 mL / NET: -1637 mL          PHYSICAL EXAM:    General: Well developed; well nourished; in no acute distress  Eyes: PERRL, EOM intact; conjunctiva and sclera clear  ENMT: No nasal discharge; airway clear  Neck: Supple; non tender; no masses  Respiratory: Three chest tubes(Ant, post and angled) in situ with minimal drain. Tidling noted. No air leak noted. Clean surgical site. Left mid lung BBS, squeak and rhonchi noted.  Cardiovascular: Regular rate and rhythm. S1 and S2 Normal; No murmurs, gallops or rubs  Gastrointestinal: Soft non-tender non-distended; Normal bowel sounds  Extremities: Normal range of motion, No clubbing, cyanosis or edema  Neurological: Alert and oriented x3  Skin: Warm and dry. No obvious rash    LABS:      CBC Full  -  ( 11 Jan 2018 06:30 )  WBC Count : 7.9 K/uL  Hemoglobin : 11.8 g/dL  Hematocrit : 36.0 %  Platelet Count - Automated : 443 K/uL  Mean Cell Volume : 90.2 fL  Mean Cell Hemoglobin : 29.6 pg  Mean Cell Hemoglobin Concentration : 32.8 g/dL    01-11    136  |  98  |  23  ----------------------------<  162<H>  4.0   |  26  |  1.44<H>    Ca    8.7      11 Jan 2018 09:57  Phos  4.0     01-09  Mg     2.1     01-11      PT/INR - ( 11 Jan 2018 06:45 )   PT: 11.9 sec;   INR: 1.07          PTT - ( 11 Jan 2018 06:45 )  PTT:31.4 sec    RADIOLOGY & ADDITIONAL STUDIES (The following images were personally reviewed):< from: Xray Chest 1 View AP-PORTABLE IMMEDIATE (01.11.18 @ 12:54) >    IMPRESSION:     No pneumothorax status post removal of one left chest tube with two tubes   remaining.    < end of copied text >

## 2018-01-11 NOTE — PROGRESS NOTE ADULT - PROBLEM SELECTOR PLAN 1
-Stable breathing and ambulating well. One chest tube removed today without complication.  S/P uncomplicated L VATS RA decortication POD#3. Doing well.  Patient with recent parapneumonic effusion/empyema with failed initial management with a chest tube and tPA x1 with no clinical improvement initially.  - On CT imaging, there is evidence of a large partially loculated left sided pleural effusion with multiple air locules likely due to previous chest tube placement  - given loculated nature of effusion, he underwent VATS   -Culture and gram stain sofar negative  -Optimize pain management, incentive spirometry, PT/OT, DVT PPX  -Chest tube management per CTS(one removed today).  - On broad coverage with Zosyn, with possible deescalation based on cultures. He needs atleast two weeks of ABX

## 2018-01-11 NOTE — DIETITIAN INITIAL EVALUATION ADULT. - OTHER INFO
63y/o M s/p L VATS RA decortication. Pt seen resting in bed. He reports a good appetite and intake. Consuming % of meals. Denies N/V/D, pain, and mechanical issues with po intake. No BM x2 days; pt attributes this to not wanting to use the shared restroom. PTA pt reports eating well and denies wt changes. RD encouraged adequate intake and low Na.

## 2018-01-11 NOTE — PROGRESS NOTE ADULT - ASSESSMENT
62 year old M PMHx HTN, MVP, presented to OSH with SOB and pleuritic chest pain, admitted for PNA, treated with IV abx, L chest tube inserted, failed TPA on 1/4/18.  Patient was unhappy with care at OSH, so he left AMA. Presented to [facility] ED, CXR and CT chest revealed  pleural effusion/empyema.  S/P uncomplicated L VATS RA decortication.

## 2018-01-11 NOTE — PROGRESS NOTE ADULT - ASSESSMENT
62 year old male with a PMHx of HTN, MVP with LVOT with a subaortic membrane seen on ECHO, who was found to have a pneumonia at an OSH for which a left chest tube was placed and was started on antibiotics but he left AMA. He presented to [facility] ED on 1/7/18, was found to have an empyema with left pleural effusion. On 1/9/18 he underwent an uncomplicated L VATS RA decortication. He is doing well and ambulating.    Neuro: Pain control  -Lidoderm, tylenol 650mg PO q6hrs, Lozenge for sore throat    CV: HTN, MVP w/ LVOT and subaortic membraine  -Lopressor 25mg q12hrs  -Monitor HR and BP    Pulm: s/p L VATS RA decortication  -anterior chest tube removed today without complication (drained 45ml)  -posterior and angle tubes still in place with no air leak and minimal drainage  -Cultures negative to date, also negative at OSH  -encouraged ambulation and use of IS (pulling 1250 now)    GI: no active issues  -regular diet  -GI ppx: pepcid 20mg qd  -bowel reg: Colace 100mg TID, senna 2 tabs    Endo: No active issues    Renal/: Cr. increased from 0.88 to 1.53  -Strict I/O's  -Monitor lytes and replete prn  -Patient encouraged to drink more fluids  -Vanco d/c'ed due to nephrotoxic effects    Heme: No active issues  -DVT ppx: Heparin 5000u subcut.  -Continue to monitor CBC    ID: Pneumonia  -Afebrile, WBC 7.9  -Continue Zosyn, vanc d/c'ed due to bump in Cr  -Will be discharged on Augmentin 875 BID for 2 weeks  -Monitor WBC and temp    Dispo: Home this weekend once medically ready.

## 2018-01-11 NOTE — PROGRESS NOTE ADULT - SUBJECTIVE AND OBJECTIVE BOX
Patient discussed on morning rounds with Dr. Gauthier  Operation / Date: L VATS RA decortication 1/9/18    SUBJECTIVE ASSESSMENT:  62 year old male seen at the bedside this morning with no acute events overnight. He endorses mild pain at the chest tube sites. He denies fever/chills, headache, dizziness, chest pain, palpitations, shortness of breath, abdominal pain, n/v/d, pain/swelling in the upper or lower extremities.    Vital Signs Last 24 Hrs  T(C): 36.5 (11 Jan 2018 14:32), Max: 37.2 (10 Joaquín 2018 22:54)  T(F): 97.7 (11 Jan 2018 14:32), Max: 98.9 (10 Joaquín 2018 22:54)  HR: 88 (11 Jan 2018 16:50) (72 - 99)  BP: 119/73 (11 Jan 2018 16:50) (107/62 - 151/85)  BP(mean): 103 (11 Jan 2018 05:17) (75 - 103)  RR: 14 (11 Jan 2018 16:50) (14 - 16)  SpO2: 98% (11 Jan 2018 16:50) (95% - 98%)    I&O's Detail  10 Joaquín 2018 07:01  -  11 Jan 2018 07:00  --------------------------------------------------------  IN:    IV PiggyBack: 700 mL    lactated ringers.: 750 mL    Oral Fluid: 1730 mL  Total IN: 3180 mL  OUT:    Chest Tube: 8 mL    Chest Tube: 30 mL    Chest Tube: 25 mL    Indwelling Catheter - Urethral: 75 mL    Voided: 2175 mL  Total OUT: 2313 mL  Total NET: 867 mL    11 Jan 2018 07:01  -  11 Jan 2018 17:35  --------------------------------------------------------  IN:    IV PiggyBack: 100 mL  Total IN: 100 mL  OUT:    Chest Tube: 22 mL    Chest Tube: 20 mL    Chest Tube: 20 mL    Voided: 1675 mL  Total OUT: 1737 mL  Total NET: -1637 mL    CHEST TUBE:  Yes (posterior/angle) AIR LEAKS: No. Suction  CLAIRE DRAIN:  Yes/No.  EPICARDIAL WIRES: Yes/No.  TIE DOWNS: Yes/No.  HANSON: Yes/No.    PHYSICAL EXAM:  General: NAD, lying comfortably in bed  Neurological: A&Ox3, no focal deficits  Cardiovascular: S1S2, +systolic murmur, RRR, no g/r  Respiratory: left side with decreased breath sounds, RT CTA. no wheezes, rhonchi or rales.  Gastrointestinal: +BS. NT/ND  Extremities: warm with no edema. No calf tenderness  Vascular: 2+ peripheral pulses  Incision Sites: left chest tube sites: c/d/i. No erythema or swelling.    LABS:                      11.8   7.9   )-----------( 443      ( 11 Jan 2018 06:30 )             36.0     COUMADIN:  No. REASON: .  PT/INR - ( 11 Jan 2018 06:45 )   PT: 11.9 sec;   INR: 1.07     PTT - ( 11 Jan 2018 06:45 )  PTT:31.4 sec    01-11  136  |  98  |  23  ----------------------------<  162<H>  4.0   |  26  |  1.44<H>  Ca    8.7      11 Jan 2018 09:57  Phos  4.0     01-09  Mg     2.1     01-11    MEDICATIONS  (STANDING):  BUpivacaine liposome 1.3% Injectable (no eMAR) 20 milliLiter(s) Local Injection once  docusate sodium 100 milliGRAM(s) Oral three times a day  docusate sodium 100 milliGRAM(s) Oral three times a day  famotidine    Tablet 20 milliGRAM(s) Oral daily  heparin  Injectable 5000 Unit(s) SubCutaneous every 8 hours  lactated ringers. 1000 milliLiter(s) (125 mL/Hr) IV Continuous <Continuous>  lidocaine   Patch 1 Patch Transdermal daily  metoprolol     tartrate 25 milliGRAM(s) Oral every 12 hours  piperacillin/tazobactam IVPB. 4.5 Gram(s) IV Intermittent every 6 hours  senna 2 Tablet(s) Oral at bedtime    MEDICATIONS  (PRN):  acetaminophen   Tablet. 650 milliGRAM(s) Oral every 6 hours PRN Mild Pain (1 - 3)  benzocaine 15 mG/menthol 3.6 mG Lozenge 1 Lozenge Oral every 2 hours PRN Sore Throat    RADIOLOGY & ADDITIONAL TESTS:  Xray Chest 1 View AP-PORTABLE IMMEDIATE (01.11.18 @ 12:54) >  IMPRESSION: No pneumothorax status post removal of one left chest tube with two tubes   remaining.

## 2018-01-11 NOTE — DIETITIAN INITIAL EVALUATION ADULT. - PROBLEM SELECTOR PLAN 1
Had chest tube at [facility] for approx 1 week. Received tPA and there was discussion of VATS. Patient preferred to come to 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 On exam, diminished BS on L side from mid lung inferiorly, consistent w/ findings on CXR on admission here. Patient does not have his [facility] from [facility] on arrival to ED. Saturating 97-98% on RA in ED. Not a smoker but etiologic DDx should include malignancy.  -Pulm consulted, F/u recs -- they will see him in AM  -O2 PRN, not needed at this time

## 2018-01-11 NOTE — DIETITIAN INITIAL EVALUATION ADULT. - PROBLEM SELECTOR PLAN 2
In ED patient w/ borderline tachycardia (around 100) and borderline leukocytosis (10.6), not neutrophilic predominant. Not tachypneic and normotensive. No distress on exam. Complains of dry cough, no sputum.  -Consider antibiotic therapy, Vanc/Zosyn given recent hospitalization -- obtain collateral from [facility] regarding Abx therapy, etc. Called hospital and was given resident group pager number -- [phone]  -F/u Pulm recs

## 2018-01-12 ENCOUNTER — TRANSCRIPTION ENCOUNTER (OUTPATIENT)
Age: 63
End: 2018-01-12

## 2018-01-12 LAB
ALBUMIN SERPL ELPH-MCNC: 2.9 G/DL — LOW (ref 3.3–5)
ALP SERPL-CCNC: 70 U/L — SIGNIFICANT CHANGE UP (ref 40–120)
ALT FLD-CCNC: 25 U/L — SIGNIFICANT CHANGE UP (ref 10–45)
ANION GAP SERPL CALC-SCNC: 10 MMOL/L — SIGNIFICANT CHANGE UP (ref 5–17)
ANION GAP SERPL CALC-SCNC: 11 MMOL/L — SIGNIFICANT CHANGE UP (ref 5–17)
ANION GAP SERPL CALC-SCNC: 12 MMOL/L — SIGNIFICANT CHANGE UP (ref 5–17)
APTT BLD: 28.4 SEC — SIGNIFICANT CHANGE UP (ref 27.5–37.4)
AST SERPL-CCNC: 21 U/L — SIGNIFICANT CHANGE UP (ref 10–40)
BILIRUB SERPL-MCNC: 0.4 MG/DL — SIGNIFICANT CHANGE UP (ref 0.2–1.2)
BUN SERPL-MCNC: 20 MG/DL — SIGNIFICANT CHANGE UP (ref 7–23)
BUN SERPL-MCNC: 21 MG/DL — SIGNIFICANT CHANGE UP (ref 7–23)
BUN SERPL-MCNC: 21 MG/DL — SIGNIFICANT CHANGE UP (ref 7–23)
CALCIUM SERPL-MCNC: 8.7 MG/DL — SIGNIFICANT CHANGE UP (ref 8.4–10.5)
CALCIUM SERPL-MCNC: 8.8 MG/DL — SIGNIFICANT CHANGE UP (ref 8.4–10.5)
CALCIUM SERPL-MCNC: 9 MG/DL — SIGNIFICANT CHANGE UP (ref 8.4–10.5)
CHLORIDE SERPL-SCNC: 102 MMOL/L — SIGNIFICANT CHANGE UP (ref 96–108)
CHLORIDE SERPL-SCNC: 103 MMOL/L — SIGNIFICANT CHANGE UP (ref 96–108)
CHLORIDE SERPL-SCNC: 99 MMOL/L — SIGNIFICANT CHANGE UP (ref 96–108)
CHLORIDE UR-SCNC: 42 MMOL/L — SIGNIFICANT CHANGE UP
CO2 SERPL-SCNC: 29 MMOL/L — SIGNIFICANT CHANGE UP (ref 22–31)
CO2 SERPL-SCNC: 29 MMOL/L — SIGNIFICANT CHANGE UP (ref 22–31)
CO2 SERPL-SCNC: 30 MMOL/L — SIGNIFICANT CHANGE UP (ref 22–31)
CREAT ?TM UR-MCNC: 21 MG/DL — SIGNIFICANT CHANGE UP
CREAT SERPL-MCNC: 1.52 MG/DL — HIGH (ref 0.5–1.3)
CREAT SERPL-MCNC: 1.56 MG/DL — HIGH (ref 0.5–1.3)
CREAT SERPL-MCNC: 1.61 MG/DL — HIGH (ref 0.5–1.3)
CULTURE RESULTS: NO GROWTH — SIGNIFICANT CHANGE UP
GLUCOSE SERPL-MCNC: 101 MG/DL — HIGH (ref 70–99)
GLUCOSE SERPL-MCNC: 102 MG/DL — HIGH (ref 70–99)
GLUCOSE SERPL-MCNC: 96 MG/DL — SIGNIFICANT CHANGE UP (ref 70–99)
HCT VFR BLD CALC: 36.3 % — LOW (ref 39–50)
HCT VFR BLD CALC: 37 % — LOW (ref 39–50)
HGB BLD-MCNC: 11.7 G/DL — LOW (ref 13–17)
HGB BLD-MCNC: 11.8 G/DL — LOW (ref 13–17)
INR BLD: 1.09 — SIGNIFICANT CHANGE UP (ref 0.88–1.16)
LACTATE SERPL-SCNC: 0.8 MMOL/L — SIGNIFICANT CHANGE UP (ref 0.5–2)
MAGNESIUM SERPL-MCNC: 1.9 MG/DL — SIGNIFICANT CHANGE UP (ref 1.6–2.6)
MAGNESIUM SERPL-MCNC: 2 MG/DL — SIGNIFICANT CHANGE UP (ref 1.6–2.6)
MAGNESIUM SERPL-MCNC: 2 MG/DL — SIGNIFICANT CHANGE UP (ref 1.6–2.6)
MCHC RBC-ENTMCNC: 29.1 PG — SIGNIFICANT CHANGE UP (ref 27–34)
MCHC RBC-ENTMCNC: 29.3 PG — SIGNIFICANT CHANGE UP (ref 27–34)
MCHC RBC-ENTMCNC: 31.9 G/DL — LOW (ref 32–36)
MCHC RBC-ENTMCNC: 32.2 G/DL — SIGNIFICANT CHANGE UP (ref 32–36)
MCV RBC AUTO: 91 FL — SIGNIFICANT CHANGE UP (ref 80–100)
MCV RBC AUTO: 91.4 FL — SIGNIFICANT CHANGE UP (ref 80–100)
OSMOLALITY UR: 199 MOSMOL/KG — SIGNIFICANT CHANGE UP (ref 100–650)
PHOSPHATE SERPL-MCNC: 3.3 MG/DL — SIGNIFICANT CHANGE UP (ref 2.5–4.5)
PHOSPHATE SERPL-MCNC: 3.6 MG/DL — SIGNIFICANT CHANGE UP (ref 2.5–4.5)
PHOSPHATE SERPL-MCNC: 3.8 MG/DL — SIGNIFICANT CHANGE UP (ref 2.5–4.5)
PLATELET # BLD AUTO: 429 K/UL — HIGH (ref 150–400)
PLATELET # BLD AUTO: 444 K/UL — HIGH (ref 150–400)
POTASSIUM SERPL-MCNC: 3.8 MMOL/L — SIGNIFICANT CHANGE UP (ref 3.5–5.3)
POTASSIUM SERPL-MCNC: 3.9 MMOL/L — SIGNIFICANT CHANGE UP (ref 3.5–5.3)
POTASSIUM SERPL-MCNC: 3.9 MMOL/L — SIGNIFICANT CHANGE UP (ref 3.5–5.3)
POTASSIUM SERPL-SCNC: 3.8 MMOL/L — SIGNIFICANT CHANGE UP (ref 3.5–5.3)
POTASSIUM SERPL-SCNC: 3.9 MMOL/L — SIGNIFICANT CHANGE UP (ref 3.5–5.3)
POTASSIUM SERPL-SCNC: 3.9 MMOL/L — SIGNIFICANT CHANGE UP (ref 3.5–5.3)
POTASSIUM UR-SCNC: 9 MMOL/L — SIGNIFICANT CHANGE UP
PROT SERPL-MCNC: 6.2 G/DL — SIGNIFICANT CHANGE UP (ref 6–8.3)
PROTHROM AB SERPL-ACNC: 12.1 SEC — SIGNIFICANT CHANGE UP (ref 9.8–12.7)
RBC # BLD: 3.99 M/UL — LOW (ref 4.2–5.8)
RBC # BLD: 4.05 M/UL — LOW (ref 4.2–5.8)
RBC # FLD: 12.4 % — SIGNIFICANT CHANGE UP (ref 10.3–16.9)
RBC # FLD: 12.4 % — SIGNIFICANT CHANGE UP (ref 10.3–16.9)
SODIUM SERPL-SCNC: 139 MMOL/L — SIGNIFICANT CHANGE UP (ref 135–145)
SODIUM SERPL-SCNC: 143 MMOL/L — SIGNIFICANT CHANGE UP (ref 135–145)
SODIUM SERPL-SCNC: 143 MMOL/L — SIGNIFICANT CHANGE UP (ref 135–145)
SODIUM UR-SCNC: 58 MMOL/L — SIGNIFICANT CHANGE UP
SPECIMEN SOURCE: SIGNIFICANT CHANGE UP
UUN UR-MCNC: 185 MG/DL — SIGNIFICANT CHANGE UP
WBC # BLD: 5.9 K/UL — SIGNIFICANT CHANGE UP (ref 3.8–10.5)
WBC # BLD: 6.2 K/UL — SIGNIFICANT CHANGE UP (ref 3.8–10.5)
WBC # FLD AUTO: 5.9 K/UL — SIGNIFICANT CHANGE UP (ref 3.8–10.5)
WBC # FLD AUTO: 6.2 K/UL — SIGNIFICANT CHANGE UP (ref 3.8–10.5)

## 2018-01-12 PROCEDURE — 99232 SBSQ HOSP IP/OBS MODERATE 35: CPT

## 2018-01-12 PROCEDURE — 71045 X-RAY EXAM CHEST 1 VIEW: CPT | Mod: 26,76

## 2018-01-12 RX ORDER — ALBUMIN HUMAN 25 %
250 VIAL (ML) INTRAVENOUS ONCE
Qty: 0 | Refills: 0 | Status: COMPLETED | OUTPATIENT
Start: 2018-01-12 | End: 2018-01-12

## 2018-01-12 RX ORDER — SODIUM CHLORIDE 9 MG/ML
1000 INJECTION INTRAMUSCULAR; INTRAVENOUS; SUBCUTANEOUS
Qty: 0 | Refills: 0 | Status: DISCONTINUED | OUTPATIENT
Start: 2018-01-12 | End: 2018-01-13

## 2018-01-12 RX ORDER — ALBUMIN HUMAN 25 %
250 VIAL (ML) INTRAVENOUS ONCE
Qty: 0 | Refills: 0 | Status: DISCONTINUED | OUTPATIENT
Start: 2018-01-12 | End: 2018-01-12

## 2018-01-12 RX ORDER — SODIUM CHLORIDE 9 MG/ML
1000 INJECTION INTRAMUSCULAR; INTRAVENOUS; SUBCUTANEOUS
Qty: 0 | Refills: 0 | Status: DISCONTINUED | OUTPATIENT
Start: 2018-01-12 | End: 2018-01-12

## 2018-01-12 RX ORDER — MAGNESIUM OXIDE 400 MG ORAL TABLET 241.3 MG
400 TABLET ORAL ONCE
Qty: 0 | Refills: 0 | Status: COMPLETED | OUTPATIENT
Start: 2018-01-12 | End: 2018-01-12

## 2018-01-12 RX ORDER — SODIUM CHLORIDE 9 MG/ML
1000 INJECTION, SOLUTION INTRAVENOUS ONCE
Qty: 0 | Refills: 0 | Status: COMPLETED | OUTPATIENT
Start: 2018-01-12 | End: 2018-01-12

## 2018-01-12 RX ORDER — POTASSIUM CHLORIDE 20 MEQ
20 PACKET (EA) ORAL ONCE
Qty: 0 | Refills: 0 | Status: COMPLETED | OUTPATIENT
Start: 2018-01-12 | End: 2018-01-12

## 2018-01-12 RX ADMIN — FAMOTIDINE 20 MILLIGRAM(S): 10 INJECTION INTRAVENOUS at 12:21

## 2018-01-12 RX ADMIN — MAGNESIUM OXIDE 400 MG ORAL TABLET 400 MILLIGRAM(S): 241.3 TABLET ORAL at 07:24

## 2018-01-12 RX ADMIN — Medication 650 MILLIGRAM(S): at 08:30

## 2018-01-12 RX ADMIN — PIPERACILLIN AND TAZOBACTAM 200 GRAM(S): 4; .5 INJECTION, POWDER, LYOPHILIZED, FOR SOLUTION INTRAVENOUS at 18:07

## 2018-01-12 RX ADMIN — Medication 125 MILLILITER(S): at 19:13

## 2018-01-12 RX ADMIN — PIPERACILLIN AND TAZOBACTAM 200 GRAM(S): 4; .5 INJECTION, POWDER, LYOPHILIZED, FOR SOLUTION INTRAVENOUS at 00:47

## 2018-01-12 RX ADMIN — HEPARIN SODIUM 5000 UNIT(S): 5000 INJECTION INTRAVENOUS; SUBCUTANEOUS at 15:07

## 2018-01-12 RX ADMIN — HEPARIN SODIUM 5000 UNIT(S): 5000 INJECTION INTRAVENOUS; SUBCUTANEOUS at 07:21

## 2018-01-12 RX ADMIN — Medication 20 MILLIEQUIVALENT(S): at 07:20

## 2018-01-12 RX ADMIN — SODIUM CHLORIDE 1000 MILLILITER(S): 9 INJECTION, SOLUTION INTRAVENOUS at 01:00

## 2018-01-12 RX ADMIN — SODIUM CHLORIDE 100 MILLILITER(S): 9 INJECTION INTRAMUSCULAR; INTRAVENOUS; SUBCUTANEOUS at 21:31

## 2018-01-12 RX ADMIN — Medication 650 MILLIGRAM(S): at 07:30

## 2018-01-12 RX ADMIN — HEPARIN SODIUM 5000 UNIT(S): 5000 INJECTION INTRAVENOUS; SUBCUTANEOUS at 21:31

## 2018-01-12 RX ADMIN — Medication 25 MILLIGRAM(S): at 18:07

## 2018-01-12 RX ADMIN — Medication 20 MILLIEQUIVALENT(S): at 19:13

## 2018-01-12 RX ADMIN — SODIUM CHLORIDE 100 MILLILITER(S): 9 INJECTION, SOLUTION INTRAVENOUS at 00:47

## 2018-01-12 RX ADMIN — PIPERACILLIN AND TAZOBACTAM 200 GRAM(S): 4; .5 INJECTION, POWDER, LYOPHILIZED, FOR SOLUTION INTRAVENOUS at 23:28

## 2018-01-12 RX ADMIN — Medication 25 MILLIGRAM(S): at 07:20

## 2018-01-12 RX ADMIN — PIPERACILLIN AND TAZOBACTAM 200 GRAM(S): 4; .5 INJECTION, POWDER, LYOPHILIZED, FOR SOLUTION INTRAVENOUS at 12:59

## 2018-01-12 RX ADMIN — MAGNESIUM OXIDE 400 MG ORAL TABLET 400 MILLIGRAM(S): 241.3 TABLET ORAL at 19:13

## 2018-01-12 RX ADMIN — LIDOCAINE 1 PATCH: 4 CREAM TOPICAL at 12:21

## 2018-01-12 RX ADMIN — PIPERACILLIN AND TAZOBACTAM 200 GRAM(S): 4; .5 INJECTION, POWDER, LYOPHILIZED, FOR SOLUTION INTRAVENOUS at 07:22

## 2018-01-12 NOTE — PHYSICAL THERAPY INITIAL EVALUATION ADULT - PERTINENT HX OF CURRENT PROBLEM, REHAB EVAL
62 year old M PMHx HTN, MVP, presented to OSH with SOB and pleuritic chest pain, admitted for PNA, treated with IV abx, L chest tube inserted, failed TPA on 1/4/18.  Patient was unhappy with care at OSH, so he left AMA. Presented to [facility] ED, CXR and CT chest revealed  pleural effusion/empyema.  S/P uncomplicated L VATS RA decortication.

## 2018-01-12 NOTE — PROGRESS NOTE ADULT - PROBLEM SELECTOR PLAN 1
S/P uncomplicated L VATS RA decortication POD#3. Doing well.  -Stable breathing and ambulating well. One chest tube removed today without complication. One CT may come out to day per surgical team.  Patient with recent parapneumonic effusion/empyema with failed initial management with a chest tube and tPA x1 with no clinical improvement initially.  - On CT imaging, there is evidence of a large partially loculated left sided pleural effusion with multiple air locules likely due to previous chest tube placement  - given loculated nature of effusion, he underwent VATS   -Culture and gram stain sofar negative  -Optimize pain management, incentive spirometry, PT/OT, DVT PPX  -Chest tube management per CTS(one to be removed today).  - On broad coverage with Zosyn, with possible deescalation based on cultures. He needs atleast two weeks of Augmentin total on discharge S/P uncomplicated L VATS RA decortication POD#3. Doing well.  -Stable breathing and ambulating well. One chest tube removed today without complication. One CT may come out to day per surgical team.  Patient with recent parapneumonic effusion/empyema with failed initial management with a chest tube and tPA x1 with no clinical improvement initially.  - On CT imaging, there is evidence of a large partially loculated left sided pleural effusion with multiple air locules likely due to previous chest tube placement  - given loculated nature of effusion, he underwent VATS   -Culture and gram stain so far negative  -Optimize pain management, incentive spirometry, PT/OT, DVT PPX  -Chest tube management per CTS(one to be removed today).  - On broad coverage with Zosyn, with possible deescalation based on cultures. He needs at least two weeks of Augmentin total on discharge
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
## 2018-01-12 NOTE — DISCHARGE NOTE ADULT - PATIENT PORTAL LINK FT
“You can access the [employer] Patient Portal, offered by [facility], by registering with the following website: [email or web address]”

## 2018-01-12 NOTE — PROGRESS NOTE ADULT - SUBJECTIVE AND OBJECTIVE BOX
Patient discussed on morning rounds with Dr. Gauthier  Operation / Date: Left VATS RA decortication 01/09/2018  SUBJECTIVE ASSESSMENT:  Patient is a 62 year old male seen at the bedside this morning with no acute events overnight.  Vital Signs Last 24 Hours  T(C): 36.8 (12 Jan 2018 05:00), Max: 37.8 (11 Jan 2018 22:27)  T(F): 98.2 (12 Jan 2018 05:00), Max: 100.1 (11 Jan 2018 22:27)  HR: 60 (12 Jan 2018 08:42) (56 - 68)  BP: 118/57 (12 Jan 2018 08:42) (96/47 - 137/62)  BP(mean): 82 (12 Jan 2018 08:42) (59 - 100)  RR: 15 (12 Jan 2018 08:42) (14 - 28)  SpO2: 96% (12 Jan 2018 08:42) (95% - 98%)    I&O's Detail    11 Jan 2018 07:01  -  12 Jan 2018 07:00  --------------------------------------------------------  IN:  Oral Fluid: 120 mL  Sodium chloride 0.45%: 15 mL  Total IN: 135 mL    OUT:  Voided: 1 mL  Total OUT: 1 mL    Total NET: 134 mL    CHEST TUBE:  Yes X 2 Right. AIR LEAKS: No. Suction.   CLAIRE DRAIN: No.  EPICARDIAL WIRES: No.  TIE DOWNS: No.  HANSON: No.    PHYSICAL EXAM:  Appearance: Normal	  HEENT:   Normal oral mucosa, PERRL, EOMI	  Neck: Supple, - JVD; Carotid Bruit   Cardiovascular: Normal S1 S2. No JVD. No murmurs.  Respiratory: Lungs clear to auscultation B/L. No Rales, Rhonchi, Wheezing.	  Gastrointestinal:  Soft, non-tender, + BS.	  Skin: No rashes. No ecchymoses. No cyanosis.  Extremities: Normal range of motion, no clubbing, cyanosis or edema.  Vascular: Peripheral pulses palpable 2+ bilaterally.  Neurologic: Non-focal.  Psychiatry: A & O x 3, mood & affect appropriate.     Incision Sites: C/D/I    Laboratory:                        8.9    7.0   )-----------( 136      ( 12 Jan 2018 06:42 )             28.2     COUMADIN: No.    PT/INR - ( 11 Jan 2018 11:17 )   PT: 11.4 sec;   INR: 1.03        PTT - ( 11 Jan 2018 11:17 )  PTT:28.8 sec    01-12    135  |  91<L>  |  59<H>  ----------------------------<  103<H>  4.0   |  25  |  10.35<H>    Ca    9.4      12 Jan 2018 06:44  Phos  4.4     01-11  Mg     2.2     01-12    TPro  6.1  /  Alb  3.2<L>  /  TBili  0.3  /  DBili  x   /  AST  23  /  ALT  <5<L>  /  AlkPhos  55  01-11    MEDICATIONS  (STANDING):  amiodarone    Tablet 400 milliGRAM(s) Oral every 12 hours  calcium acetate 667 milliGRAM(s) Oral three times a day with meals  finasteride 5 milliGRAM(s) Oral daily  heparin  Injectable 5000 Unit(s) SubCutaneous every 12 hours  influenza   Vaccine 0.5 milliLiter(s) IntraMuscular once  lidocaine   Patch 1 Patch Transdermal daily  midodrine 2.5 milliGRAM(s) Oral every 8 hours  pantoprazole    Tablet 40 milliGRAM(s) Oral before breakfast  sodium chloride 0.9% lock flush 3 milliLiter(s) IV Push every 8 hours  tamsulosin 0.4 milliGRAM(s) Oral at bedtime  testosterone patch 4 mG/24 Hr(s) 4 milliGRAM(s) Transdermal daily    MEDICATIONS  (PRN):  acetaminophen   Tablet. 650 milliGRAM(s) Oral every 6 hours PRN Mild Pain (1 - 3)  polyethylene glycol 3350 17 Gram(s) Oral daily PRN Constipation    RADIOLOGY & ADDITIONAL TESTS:  See EMR

## 2018-01-12 NOTE — PHYSICAL THERAPY INITIAL EVALUATION ADULT - GENERAL OBSERVATIONS, REHAB EVAL
Pt received supine, +CT to suction (suction removed by RN prior to ambulation), +IV, +telemetry, NAD, agreeable to PT.

## 2018-01-12 NOTE — DISCHARGE NOTE ADULT - PLAN OF CARE
s/p -Please follow up with Dr. Gauthier on ___.  The office is located at [facility], [facility], 4th floor. Call us with any questions #[phone].      -Walk daily as tolerated and use your incentive spirometer every hour.    -No driving or strenuous activity/exercise for 6 weeks, or until cleared by your surgeon.     -Gently clean your incisions with anti-bacterial soap and water, pat dry.  You may leave them open to air.    -Call your doctor if you have shortness of breath, chest pain not relieved by pain medication, dizziness, fever >101.5, or increased redness or drainage from incisions. s/p VATS decortication - Please follow up with Dr. Gauthier on 1/18/18 at 1:45pm.  The office is located at [facility], [facility], 4th floor. Call us with any questions #[phone].      - Please keep your chest tube dressings clean and dry until you see Dr. Gauthier in the office. You may shower but please keep the dressings intact until your follow up appointment. You also have staples at your incision site that will be removed at your appointment.   - You have been diagnosed with Pneumonia, please continue Augmentin 875mg twice daily for 14 days. Please follow up with Dr. Brown (pulmonologist) within 1-2 weeks from discharge.     - You have been diagnosed with Left ventricular outflow tract obstruction, you were evaluated by cardiology and cleared for surgery and started on metoprolol 25mg twice daily. Please follow up with Dr. Jorgensen (cardiologist) within 1-2 weeks from discharge, or you may follow up with a cardiologist in [place].     - Your kidney function is assessed based off your creatinine levels, your creatinine since surgery has been elevated since surgery. Today on day of discharge your BUN/Creatinine is 16/1.51 respectively. Please follow up with your primary care provider within 1 week to have repeat blood work and recheck your creatinine levels.      - Walk daily as tolerated and use your incentive spirometer every hour.    - No driving or strenuous activity/exercise for 6 weeks, or until cleared by your surgeon.     -Gently clean your incisions with anti-bacterial soap and water, pat dry.  You may leave them open to air.    -Call your doctor if you have shortness of breath, chest pain not relieved by pain medication, dizziness, fever >101.5, or increased redness or drainage from incisions.

## 2018-01-12 NOTE — PROGRESS NOTE ADULT - ASSESSMENT
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 He presented here to [facility] ED on 01/07/2018, empyema with left pleural effusion found. 01/09/2018 he underwent an uncomplicated L VATS RA decortication.     Neurology: Pain control. A/O X 3. No NFD.   -C/W current Rx.    CV: HTN, MVP w/ LVOT and subaortic membrane.  -Cardiology following. Following daily recommendations.   -Lopressor 25mg [id number].   -Monitor HR and BP.    Pulm: s/p L VATS RA decortication.  -anterior chest tube removed 01/09/2018 without complication  -posterior chest tube to be pulled today.  -both chest tubes still in place with no air leak and minimal drainage  -OR cultures negative to date, also negative at OSH.  -encouraged ambulation and use of IS (pulling 1250 now)    GI: No active issues.  -Regular diet  -GI ppx  -Bowel Rx.    Endocrine: No active issues    Renal/: Cr. increased from 0.88 to 1.53 to 1.6. Now 1.5.   -Strict I/O's  -Monitor lytes and replete prn.  -Patient encouraged to drink more fluids IVF.  -Vanco d/c'ed due to nephrotoxic effects.    Hematology: No active issues  -DVT ppx: Heparin 5000u sq.  -Continue to monitor CBC    ID: Pneumonia  -Afebrile, WBC 6.2  -Continue Zosyn, Vancomycin held due to bump in BUN/Cr.   -Will be discharged on Augmentin 875 BID for 2 weeks.  -Monitor WBC and Temperature      Disposition: Home once medically ready.

## 2018-01-12 NOTE — DISCHARGE NOTE ADULT - CARE PROVIDER_API CALL
Kishor Gauthier (MD), Surgery; Thoracic Surgery  [address]  4th Floor  [place], [place] [address]  Phone: [phone]  Fax: [phone] Kishor Gauthier), Surgery; Thoracic Surgery  [address]  4th [place] [address]  Phone: [phone]  Fax: [phone]    Jaret Brown), Critical Care Medicine; Internal Medicine; Pulmonary Disease  [address] [address]  Phone: [phone]  Fax: [phone]    Gail Jorgensen), Internal Medicine  [address] [id number]  Phone: [phone]  Fax: [phone]

## 2018-01-12 NOTE — PROGRESS NOTE ADULT - SUBJECTIVE AND OBJECTIVE BOX
Interval Events:  Patient seen and examined at bedside. Sitting comfortably on chair. No cough or SOB. Mild chest pain at the surgical site. No fever or chills.    REVIEW OF SYSTEMS:  Constitutional: No fever, weight loss or fatigue  Respiratory: As per subjective  Cardiovascular: Mild chest pain, but no palpitations, dizziness or leg swelling  Gastrointestinal: No abdominal or epigastric pain. No nausea, vomiting or hematemesis; No diarrhea or constipation. No melena or hematochezia.  Neurological: No headaches, memory loss, loss of strength, numbness or tremors  Skin: No itching, burning, rashes or lesions     MEDICATIONS:  Pulmonary:    Antimicrobials:  piperacillin/tazobactam IVPB. 4.5 Gram(s) IV Intermittent every 6 hours    Anticoagulants:  heparin  Injectable 5000 Unit(s) SubCutaneous every 8 hours    Cardiac:  metoprolol     tartrate 25 milliGRAM(s) Oral every 12 hours      Allergies    No Known Drug Allergies  Seafood (Rash)    Intolerances    lactose (Vomiting)      Vital Signs Last 24 Hrs  T(C): 36.2 (12 Jan 2018 09:18), Max: 36.9 (11 Jan 2018 22:17)  T(F): 97.2 (12 Jan 2018 09:18), Max: 98.4 (11 Jan 2018 22:17)  HR: 90 (12 Jan 2018 05:34) (70 - 99)  BP: 144/84 (12 Jan 2018 05:34) (116/74 - 151/85)  BP(mean): 117 (12 Jan 2018 05:34) (90 - 117)  RR: 18 (12 Jan 2018 00:45) (14 - 18)  SpO2: 95% (12 Jan 2018 05:34) (94% - 98%)    01-11 @ 07:01  -  01-12 @ 07:00  --------------------------------------------------------  IN: 2780 mL / OUT: 4397 mL / NET: -1617 mL    PHYSICAL EXAM:    General: Well developed; well nourished; in no acute distress  Eyes: PERRL, EOM intact; conjunctiva and sclera clear  ENMT: No nasal discharge; airway clear  Neck: Supple; non tender; no masses  Respiratory: Two chest tubes(Ant, post and angled) in situ with minimal drain. Tidling noted. No air leak noted. Clean surgical site. Left mid lung BBS, squeak and rhonchi noted.  Cardiovascular: Regular rate and rhythm. S1 and S2 Normal; No murmurs, gallops or rubs  Gastrointestinal: Soft non-tender non-distended; Normal bowel sounds  Extremities: Normal range of motion, No clubbing, cyanosis or edema  Neurological: Alert and oriented x3  Skin: Warm and dry. No obvious rash    LABS:      CBC Full  -  ( 12 Jan 2018 06:04 )  WBC Count : 6.2 K/uL  Hemoglobin : 11.8 g/dL  Hematocrit : 37.0 %  Platelet Count - Automated : 444 K/uL  Mean Cell Volume : 91.4 fL  Mean Cell Hemoglobin : 29.1 pg  Mean Cell Hemoglobin Concentration : 31.9 g/dL      01-12    143  |  102  |  21  ----------------------------<  102<H>  3.9   |  29  |  1.56<H>    Ca    9.0      12 Jan 2018 06:03  Phos  3.3     01-12  Mg     2.0     01-12    TPro  6.2  /  Alb  2.9<L>  /  TBili  0.4  /  DBili  x   /  AST  21  /  ALT  25  /  AlkPhos  70  01-12    PT/INR - ( 12 Jan 2018 03:45 )   PT: 12.1 sec;   INR: 1.09          PTT - ( 12 Jan 2018 03:45 )  PTT:28.4 sec                  RADIOLOGY & ADDITIONAL STUDIES (The following images were personally reviewed):

## 2018-01-12 NOTE — DISCHARGE NOTE ADULT - MEDICATION SUMMARY - MEDICATIONS TO TAKE
I will START or STAY ON the medications listed below when I get home from the hospital:    acetaminophen 325 mg oral tablet  -- 2 tab(s) by mouth every 6 hours, As needed, Mild Pain (1 - 3)  -- Indication: For As needed for mild pain    metoprolol tartrate 25 mg oral tablet  -- 1 tab(s) by mouth every 12 hours  -- Indication: For Blood Pressure / Heart rate     docusate sodium 100 mg oral capsule  -- 1 cap(s) by mouth 3 times a day  -- Indication: For Stool softener, hold for loose stool    senna oral tablet  -- 2 tab(s) by mouth once a day (at bedtime)  -- Indication: For Stool softener, hold for loose stool    Augmentin 875 mg-125 mg oral tablet  -- 1 tab(s) by mouth every 12 hours   -- Finish all this medication unless otherwise directed by prescriber.  Take with food or milk.    -- Indication: For Pneumonia

## 2018-01-12 NOTE — PHYSICAL THERAPY INITIAL EVALUATION ADULT - ADDITIONAL COMMENTS
Prior to hospitalization pt was living in an elevator access apartment and performing all functional mobility with no DME. Reports he was very active and an avid biker.

## 2018-01-12 NOTE — PROGRESS NOTE ADULT - ASSESSMENT
62 year old M PMHx HTN, MVP, presented to OSH with SOB and pleuritic chest pain, admitted for PNA, treated with IV abx, L chest tube inserted, failed TPA on 1/4/18.  Patient was unhappy with care at OSH, so he left AMA. Presented to [facility] ED, CXR and CT chest revealed  pleural effusion/empyema.  S/P uncomplicated L VATS RA decortication.
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
## 2018-01-12 NOTE — DISCHARGE NOTE ADULT - CARE PROVIDERS DIRECT ADDRESSES
,[email or web address]

## 2018-01-12 NOTE — PROGRESS NOTE ADULT - ATTENDING COMMENTS
The patient feels well. Afebrile. CBC being monitored. There was a significant rise in the Cr. Suggest stopping Vanco. Would consider switching IV antibiotics to po antibiotics like Augmentin 875 mg bid for 2 weeks. F/U pro-calcitonin, WBC.  I feel one of the chest tubes may be able to come out.  Agree with ambulation and incentive spirometry
Post op day 3, S/P VATS left sided decortication. Clinically stable. Afebrile. Feeling better. Ambulated twice yesterday. Good air entry, diminished left base. Pleural rub at the left base. No air leak detected from either of the two chest tubes. Agree with removal of one chest tube at a time. S.Cr is slowly coming down. Vancomycin discontinued.   Suggest:  1. Encourage ambulation  2. Monitor WBC, temps, S.Cr, and fluid drainage from chest tubes and CXR   3. Incentive spirometry  4. Transitioning to an oral antibiotic like augmentin  5. Check SpO2 when patient ambulates  6. Clinic visit within 2 weeks of discharge (pt should be kept on antibiotics till out patient visit, when he would be reassed)
The patient had a respiratory tract infection and did not seek attention for a few days, He felt run down and developed pleuritic chest pain. He was admitted to a local hospital and had a chest tube placed with possibly instillation of t-PA. Despite antibiotics, the patient did not feel better. He came to [facility] and underwent decortication. I agree with the decision to decorticate since he had developed loculated effusions with split pleural sign right over the diaphragm.   Currently, he is on Zosyn+Vanco. Cultures are negative so far. We will send Pro-calcitonin levels. The patient has three chest tubes. None are bubbling air. The volume of fluid is minimal in the last 24 hours. Agree with removing 1-2 chest tubes a day and observing
Pt seen and examined by me at bedside earlier in AM. Agree with housestaff's exam/a/p as noted above with additions,   cardiac MRI reviewed with cards. >4METS, RCRI=1,Pt is medically optimized and cleared by cards for VATS today.  Agree with rest of a/p as above
Pt seen and examined by me at bedside earlier in AM. Agree with housestaff's exam/a/p as noted above with addition,   Echo reviewed: finding above  LVOT: appreciate cards consult, start BB and IVF; pending cardiac MRI  Loculated L PNA: appreciate pulm/CTS consult, pt not medically optimized currently for VATS, clinically stable, on vanco/zosyn  Agree with rest of a/p as above

## 2018-01-12 NOTE — DISCHARGE NOTE ADULT - CARE PLAN
Goal:	s/p  Instructions for follow-up, activity and diet:	-Please follow up with Dr. Gauthier on ___.  The office is located at [facility], 4th floor. Call us with any questions #[phone].      -Walk daily as tolerated and use your incentive spirometer every hour.    -No driving or strenuous activity/exercise for 6 weeks, or until cleared by your surgeon.     -Gently clean your incisions with anti-bacterial soap and water, pat dry.  You may leave them open to air.    -Call your doctor if you have shortness of breath, chest pain not relieved by pain medication, dizziness, fever >101.5, or increased redness or drainage from incisions. Principal Discharge DX:	Empyema lung  Goal:	s/p VATS decortication  Instructions for follow-up, activity and diet:	- Please follow up with Dr. Gauthier on 1/18/18 at 1:45pm.  The office is located at [facility], 4th floor. Call us with any questions #[phone].      - Please keep your chest tube dressings clean and dry until you see Dr. Gauthier in the office. You may shower but please keep the dressings intact until your follow up appointment. You also have staples at your incision site that will be removed at your appointment.   - You have been diagnosed with Pneumonia, please continue Augmentin 875mg twice daily for 14 days. Please follow up with Dr. Brown (pulmonologist) within 1-2 weeks from discharge.     - You have been diagnosed with Left ventricular outflow tract obstruction, you were evaluated by cardiology and cleared for surgery and started on metoprolol 25mg twice daily. Please follow up with Dr. Jorgensen (cardiologist) within 1-2 weeks from discharge, or you may follow up with a cardiologist in [place].     - Your kidney function is assessed based off your creatinine levels, your creatinine since surgery has been elevated since surgery. Today on day of discharge your BUN/Creatinine is 16/1.51 respectively. Please follow up with your primary care provider within 1 week to have repeat blood work and recheck your creatinine levels.      - Walk daily as tolerated and use your incentive spirometer every hour.    - No driving or strenuous activity/exercise for 6 weeks, or until cleared by your surgeon.     -Gently clean your incisions with anti-bacterial soap and water, pat dry.  You may leave them open to air.    -Call your doctor if you have shortness of breath, chest pain not relieved by pain medication, dizziness, fever >101.5, or increased redness or drainage from incisions.

## 2018-01-13 VITALS — TEMPERATURE: 100 F

## 2018-01-13 LAB
ALBUMIN SERPL ELPH-MCNC: 3.1 G/DL — LOW (ref 3.3–5)
ALP SERPL-CCNC: 71 U/L — SIGNIFICANT CHANGE UP (ref 40–120)
ALT FLD-CCNC: 28 U/L — SIGNIFICANT CHANGE UP (ref 10–45)
ANION GAP SERPL CALC-SCNC: 12 MMOL/L — SIGNIFICANT CHANGE UP (ref 5–17)
APTT BLD: 29.4 SEC — SIGNIFICANT CHANGE UP (ref 27.5–37.4)
AST SERPL-CCNC: 27 U/L — SIGNIFICANT CHANGE UP (ref 10–40)
BILIRUB SERPL-MCNC: 0.5 MG/DL — SIGNIFICANT CHANGE UP (ref 0.2–1.2)
BUN SERPL-MCNC: 16 MG/DL — SIGNIFICANT CHANGE UP (ref 7–23)
CALCIUM SERPL-MCNC: 9.2 MG/DL — SIGNIFICANT CHANGE UP (ref 8.4–10.5)
CHLORIDE SERPL-SCNC: 102 MMOL/L — SIGNIFICANT CHANGE UP (ref 96–108)
CO2 SERPL-SCNC: 28 MMOL/L — SIGNIFICANT CHANGE UP (ref 22–31)
CREAT SERPL-MCNC: 1.51 MG/DL — HIGH (ref 0.5–1.3)
CULTURE RESULTS: SIGNIFICANT CHANGE UP
CULTURE RESULTS: SIGNIFICANT CHANGE UP
GLUCOSE SERPL-MCNC: 99 MG/DL — SIGNIFICANT CHANGE UP (ref 70–99)
HCT VFR BLD CALC: 34.5 % — LOW (ref 39–50)
HGB BLD-MCNC: 11.6 G/DL — LOW (ref 13–17)
INR BLD: 1.09 — SIGNIFICANT CHANGE UP (ref 0.88–1.16)
MAGNESIUM SERPL-MCNC: 2 MG/DL — SIGNIFICANT CHANGE UP (ref 1.6–2.6)
MCHC RBC-ENTMCNC: 30.1 PG — SIGNIFICANT CHANGE UP (ref 27–34)
MCHC RBC-ENTMCNC: 33.6 G/DL — SIGNIFICANT CHANGE UP (ref 32–36)
MCV RBC AUTO: 89.4 FL — SIGNIFICANT CHANGE UP (ref 80–100)
PHOSPHATE SERPL-MCNC: 3.2 MG/DL — SIGNIFICANT CHANGE UP (ref 2.5–4.5)
PLATELET # BLD AUTO: 383 K/UL — SIGNIFICANT CHANGE UP (ref 150–400)
POTASSIUM SERPL-MCNC: 4 MMOL/L — SIGNIFICANT CHANGE UP (ref 3.5–5.3)
POTASSIUM SERPL-SCNC: 4 MMOL/L — SIGNIFICANT CHANGE UP (ref 3.5–5.3)
PROT SERPL-MCNC: 6.4 G/DL — SIGNIFICANT CHANGE UP (ref 6–8.3)
PROTHROM AB SERPL-ACNC: 12.1 SEC — SIGNIFICANT CHANGE UP (ref 9.8–12.7)
RBC # BLD: 3.86 M/UL — LOW (ref 4.2–5.8)
RBC # FLD: 12.3 % — SIGNIFICANT CHANGE UP (ref 10.3–16.9)
SODIUM SERPL-SCNC: 142 MMOL/L — SIGNIFICANT CHANGE UP (ref 135–145)
SPECIMEN SOURCE: SIGNIFICANT CHANGE UP
SPECIMEN SOURCE: SIGNIFICANT CHANGE UP
WBC # BLD: 5.9 K/UL — SIGNIFICANT CHANGE UP (ref 3.8–10.5)
WBC # FLD AUTO: 5.9 K/UL — SIGNIFICANT CHANGE UP (ref 3.8–10.5)

## 2018-01-13 PROCEDURE — 71045 X-RAY EXAM CHEST 1 VIEW: CPT | Mod: 26,76

## 2018-01-13 PROCEDURE — 84100 ASSAY OF PHOSPHORUS: CPT

## 2018-01-13 PROCEDURE — 81001 URINALYSIS AUTO W/SCOPE: CPT

## 2018-01-13 PROCEDURE — 85730 THROMBOPLASTIN TIME PARTIAL: CPT

## 2018-01-13 PROCEDURE — 93005 ELECTROCARDIOGRAM TRACING: CPT

## 2018-01-13 PROCEDURE — 87102 FUNGUS ISOLATION CULTURE: CPT

## 2018-01-13 PROCEDURE — 84133 ASSAY OF URINE POTASSIUM: CPT

## 2018-01-13 PROCEDURE — 87086 URINE CULTURE/COLONY COUNT: CPT

## 2018-01-13 PROCEDURE — 82746 ASSAY OF FOLIC ACID SERUM: CPT

## 2018-01-13 PROCEDURE — 83605 ASSAY OF LACTIC ACID: CPT

## 2018-01-13 PROCEDURE — 83935 ASSAY OF URINE OSMOLALITY: CPT

## 2018-01-13 PROCEDURE — 99285 EMERGENCY DEPT VISIT HI MDM: CPT | Mod: 25

## 2018-01-13 PROCEDURE — 71045 X-RAY EXAM CHEST 1 VIEW: CPT

## 2018-01-13 PROCEDURE — 93306 TTE W/DOPPLER COMPLETE: CPT

## 2018-01-13 PROCEDURE — 85025 COMPLETE CBC W/AUTO DIFF WBC: CPT

## 2018-01-13 PROCEDURE — 84300 ASSAY OF URINE SODIUM: CPT

## 2018-01-13 PROCEDURE — 87040 BLOOD CULTURE FOR BACTERIA: CPT

## 2018-01-13 PROCEDURE — 80053 COMPREHEN METABOLIC PANEL: CPT

## 2018-01-13 PROCEDURE — 83550 IRON BINDING TEST: CPT

## 2018-01-13 PROCEDURE — 99232 SBSQ HOSP IP/OBS MODERATE 35: CPT

## 2018-01-13 PROCEDURE — 87798 DETECT AGENT NOS DNA AMP: CPT

## 2018-01-13 PROCEDURE — 87581 M.PNEUMON DNA AMP PROBE: CPT

## 2018-01-13 PROCEDURE — 87075 CULTR BACTERIA EXCEPT BLOOD: CPT

## 2018-01-13 PROCEDURE — 82436 ASSAY OF URINE CHLORIDE: CPT

## 2018-01-13 PROCEDURE — 36415 COLL VENOUS BLD VENIPUNCTURE: CPT

## 2018-01-13 PROCEDURE — 84466 ASSAY OF TRANSFERRIN: CPT

## 2018-01-13 PROCEDURE — 87486 CHLMYD PNEUM DNA AMP PROBE: CPT

## 2018-01-13 PROCEDURE — 82728 ASSAY OF FERRITIN: CPT

## 2018-01-13 PROCEDURE — 82550 ASSAY OF CK (CPK): CPT

## 2018-01-13 PROCEDURE — 94150 VITAL CAPACITY TEST: CPT

## 2018-01-13 PROCEDURE — 87070 CULTURE OTHR SPECIMN AEROBIC: CPT

## 2018-01-13 PROCEDURE — 82607 VITAMIN B-12: CPT

## 2018-01-13 PROCEDURE — S2900: CPT

## 2018-01-13 PROCEDURE — 86850 RBC ANTIBODY SCREEN: CPT

## 2018-01-13 PROCEDURE — 84540 ASSAY OF URINE/UREA-N: CPT

## 2018-01-13 PROCEDURE — 71250 CT THORAX DX C-: CPT

## 2018-01-13 PROCEDURE — 87252 VIRUS INOCULATION TISSUE: CPT

## 2018-01-13 PROCEDURE — P9045: CPT

## 2018-01-13 PROCEDURE — 85027 COMPLETE CBC AUTOMATED: CPT

## 2018-01-13 PROCEDURE — 87206 SMEAR FLUORESCENT/ACID STAI: CPT

## 2018-01-13 PROCEDURE — 87633 RESP VIRUS 12-25 TARGETS: CPT

## 2018-01-13 PROCEDURE — 85610 PROTHROMBIN TIME: CPT

## 2018-01-13 PROCEDURE — 83735 ASSAY OF MAGNESIUM: CPT

## 2018-01-13 PROCEDURE — 75561 CARDIAC MRI FOR MORPH W/DYE: CPT

## 2018-01-13 PROCEDURE — 84484 ASSAY OF TROPONIN QUANT: CPT

## 2018-01-13 PROCEDURE — 87116 MYCOBACTERIA CULTURE: CPT

## 2018-01-13 PROCEDURE — 87449 NOS EACH ORGANISM AG IA: CPT

## 2018-01-13 PROCEDURE — 88305 TISSUE EXAM BY PATHOLOGIST: CPT

## 2018-01-13 PROCEDURE — 80048 BASIC METABOLIC PNL TOTAL CA: CPT

## 2018-01-13 PROCEDURE — 87015 SPECIMEN INFECT AGNT CONCNTJ: CPT

## 2018-01-13 PROCEDURE — 97161 PT EVAL LOW COMPLEX 20 MIN: CPT

## 2018-01-13 PROCEDURE — 82570 ASSAY OF URINE CREATININE: CPT

## 2018-01-13 PROCEDURE — 80202 ASSAY OF VANCOMYCIN: CPT

## 2018-01-13 PROCEDURE — A9577: CPT

## 2018-01-13 PROCEDURE — 86900 BLOOD TYPING SEROLOGIC ABO: CPT

## 2018-01-13 PROCEDURE — 82553 CREATINE MB FRACTION: CPT

## 2018-01-13 PROCEDURE — 86901 BLOOD TYPING SEROLOGIC RH(D): CPT

## 2018-01-13 PROCEDURE — 88304 TISSUE EXAM BY PATHOLOGIST: CPT

## 2018-01-13 RX ORDER — SODIUM CHLORIDE 9 MG/ML
1000 INJECTION INTRAMUSCULAR; INTRAVENOUS; SUBCUTANEOUS ONCE
Qty: 0 | Refills: 0 | Status: COMPLETED | OUTPATIENT
Start: 2018-01-13 | End: 2018-01-13

## 2018-01-13 RX ORDER — ACETAMINOPHEN 500 MG
2 TABLET ORAL
Qty: 240 | Refills: 0
Start: 2018-01-13 | End: 2018-02-11

## 2018-01-13 RX ORDER — AMLODIPINE BESYLATE 2.5 MG/1
1 TABLET ORAL
Qty: 0 | Refills: 0 | COMMUNITY

## 2018-01-13 RX ORDER — SENNA PLUS 8.6 MG/1
2 TABLET ORAL
Qty: 14 | Refills: 0
Start: 2018-01-13 | End: 2018-01-19

## 2018-01-13 RX ORDER — DOCUSATE SODIUM 100 MG
1 CAPSULE ORAL
Qty: 21 | Refills: 0
Start: 2018-01-13 | End: 2018-01-19

## 2018-01-13 RX ORDER — METOPROLOL TARTRATE 50 MG
1 TABLET ORAL
Qty: 60 | Refills: 0
Start: 2018-01-13 | End: 2018-02-11

## 2018-01-13 RX ADMIN — LIDOCAINE 1 PATCH: 4 CREAM TOPICAL at 00:30

## 2018-01-13 RX ADMIN — FAMOTIDINE 20 MILLIGRAM(S): 10 INJECTION INTRAVENOUS at 11:28

## 2018-01-13 RX ADMIN — Medication 25 MILLIGRAM(S): at 05:40

## 2018-01-13 RX ADMIN — PIPERACILLIN AND TAZOBACTAM 200 GRAM(S): 4; .5 INJECTION, POWDER, LYOPHILIZED, FOR SOLUTION INTRAVENOUS at 05:40

## 2018-01-13 RX ADMIN — SODIUM CHLORIDE 500 MILLILITER(S): 9 INJECTION INTRAMUSCULAR; INTRAVENOUS; SUBCUTANEOUS at 11:23

## 2018-01-13 RX ADMIN — LIDOCAINE 1 PATCH: 4 CREAM TOPICAL at 11:28

## 2018-01-13 RX ADMIN — PIPERACILLIN AND TAZOBACTAM 200 GRAM(S): 4; .5 INJECTION, POWDER, LYOPHILIZED, FOR SOLUTION INTRAVENOUS at 11:28

## 2018-01-13 RX ADMIN — HEPARIN SODIUM 5000 UNIT(S): 5000 INJECTION INTRAVENOUS; SUBCUTANEOUS at 05:40

## 2018-01-13 NOTE — PROGRESS NOTE ADULT - PROVIDER SPECIALTY LIST ADULT
CT Surgery
Cardiology
Internal Medicine
Pulmonology
CT Surgery
CT Surgery
Thoracic Surgery
Internal Medicine

## 2018-01-13 NOTE — CHART NOTE - NSCHARTNOTEFT_GEN_A_CORE
As per Dr. Giraldo, left pleural chest tube removed at bedside this morning without any complications. U stitch tied down in place and occlusive dressing applied. Patient tolerated procedure well. Follow up CXR stable with no obvious ptx.

## 2018-01-13 NOTE — PROGRESS NOTE ADULT - PROBLEM SELECTOR PROBLEM 1
Empyema lung
Empyema lung
Pleural effusion
Empyema lung
Pleural effusion

## 2018-01-13 NOTE — PROGRESS NOTE ADULT - SUBJECTIVE AND OBJECTIVE BOX
OVERNIGHT EVENTS:   No acute events overnight.    VITALS:  T(C): , Max: 37.7 (01-13-18 @ 13:54)  HR:  (74 - 94)  BP:  (120/81 - 144/77)  ABP: --  RR:  (12 - 18)  SpO2:  (95% - 99%)  Wt(kg): --      01-12-18 @ 07:01  -  01-13-18 @ 07:00  --------------------------------------------------------  IN: 4095 mL / OUT: 4080 mL / NET: 15 mL    01-13-18 @ 07:01  -  01-13-18 @ 14:19  --------------------------------------------------------  IN: 100 mL / OUT: 550 mL / NET: -450 mL      LABS:  Na: 142 (01-13 @ 06:10), 139 (01-12 @ 16:56), 143 (01-12 @ 06:03), 143 (01-12 @ 03:45), 139 (01-11 @ 20:28), 136 (01-11 @ 09:57), 140 (01-11 @ 06:29)  K: 4.0 (01-13 @ 06:10), 3.8 (01-12 @ 16:56), 3.9 (01-12 @ 06:03), 3.9 (01-12 @ 03:45), 3.9 (01-11 @ 20:28), 4.0 (01-11 @ 09:57), 3.9 (01-11 @ 06:29)  Cl: 102 (01-13 @ 06:10), 99 (01-12 @ 16:56), 102 (01-12 @ 06:03), 103 (01-12 @ 03:45), 100 (01-11 @ 20:28), 98 (01-11 @ 09:57), 101 (01-11 @ 06:29)  CO2: 28 (01-13 @ 06:10), 30 (01-12 @ 16:56), 29 (01-12 @ 06:03), 29 (01-12 @ 03:45), 25 (01-11 @ 20:28), 26 (01-11 @ 09:57), 26 (01-11 @ 06:29)  BUN: 16 (01-13 @ 06:10), 20 (01-12 @ 16:56), 21 (01-12 @ 06:03), 21 (01-12 @ 03:45), 23 (01-11 @ 20:28), 23 (01-11 @ 09:57), 25 (01-11 @ 06:29)  Cr: 1.51 (01-13 @ 06:10), 1.52 (01-12 @ 16:56), 1.56 (01-12 @ 06:03), 1.61 (01-12 @ 03:45), 1.60 (01-11 @ 20:28), 1.44 (01-11 @ 09:57), 1.53 (01-11 @ 06:29)  Glu: 99(01-13 @ 06:10), 96(01-12 @ 16:56), 102(01-12 @ 06:03), 101(01-12 @ 03:45), 105(01-11 @ 20:28), 162(01-11 @ 09:57), 104(01-11 @ 06:29)    Hgb: 11.6 (01-13 @ 06:10), 11.8 (01-12 @ 06:04), 11.7 (01-12 @ 03:45), 11.8 (01-11 @ 06:30)  Hct: 34.5 (01-13 @ 06:10), 37.0 (01-12 @ 06:04), 36.3 (01-12 @ 03:45), 36.0 (01-11 @ 06:30)  WBC: 5.9 (01-13 @ 06:10), 6.2 (01-12 @ 06:04), 5.9 (01-12 @ 03:45), 7.9 (01-11 @ 06:30)  Plt: 383 (01-13 @ 06:10), 444 (01-12 @ 06:04), 429 (01-12 @ 03:45), 443 (01-11 @ 06:30)    INR: 1.09 01-13-18 @ 06:10, 1.09 01-12-18 @ 03:45, 1.07 01-11-18 @ 06:45  PTT: 29.4 01-13-18 @ 06:10, 28.4 01-12-18 @ 03:45, 31.4 01-11-18 @ 06:45    IMAGING:   Recent imaging studies were reviewed.    MEDICATIONS:  acetaminophen   Tablet. 650 milliGRAM(s) Oral every 6 hours PRN  benzocaine 15 mG/menthol 3.6 mG Lozenge 1 Lozenge Oral every 2 hours PRN  docusate sodium 100 milliGRAM(s) Oral three times a day  famotidine    Tablet 20 milliGRAM(s) Oral daily  heparin  Injectable 5000 Unit(s) SubCutaneous every 8 hours  lactated ringers. 1000 milliLiter(s) IV Continuous <Continuous>  lidocaine   Patch 1 Patch Transdermal daily  metoprolol     tartrate 25 milliGRAM(s) Oral every 12 hours  piperacillin/tazobactam IVPB. 4.5 Gram(s) IV Intermittent every 6 hours  senna 2 Tablet(s) Oral at bedtime  sodium chloride 0.9%. 1000 milliLiter(s) IV Continuous <Continuous>    EXAMINATION:  General:  NAD  HEENT: MP: Neck is supple, No JVD  Cardio: s1s2 RRR. No murmurs.   Respiratory: Clear to auscultation b/l  Adomen:  soft, NT  Extremities:  no edema  Skin:  warm/dry  Neuro:  awake, alert, oriented x 3, follows commands, moves all extremities

## 2018-01-13 NOTE — PROGRESS NOTE ADULT - PROBLEM SELECTOR PLAN 1
s/p VATS.  Chest tubes removed. He looks well.  He needs at least two weeks of Augmentin total on discharge  f/u Dr Brown as an outpatient

## 2018-01-13 NOTE — PROGRESS NOTE ADULT - SUBJECTIVE AND OBJECTIVE BOX
Chief Complaint/Reason for Consult: lvot obst  INTERVAL HPI: post op s complications no events on tele, chest tube in place oob to chair  	  MEDICATIONS:  metoprolol     tartrate 25 milliGRAM(s) Oral every 12 hours    piperacillin/tazobactam IVPB. 4.5 Gram(s) IV Intermittent every 6 hours      acetaminophen   Tablet. 650 milliGRAM(s) Oral every 6 hours PRN    docusate sodium 100 milliGRAM(s) Oral three times a day  famotidine    Tablet 20 milliGRAM(s) Oral daily  senna 2 Tablet(s) Oral at bedtime      benzocaine 15 mG/menthol 3.6 mG Lozenge 1 Lozenge Oral every 2 hours PRN  heparin  Injectable 5000 Unit(s) SubCutaneous every 8 hours  lactated ringers. 1000 milliLiter(s) IV Continuous <Continuous>  lidocaine   Patch 1 Patch Transdermal daily  sodium chloride 0.9%. 1000 milliLiter(s) IV Continuous <Continuous>      REVIEW OF SYSTEMS:  [x] As per HPI  CONSTITUTIONAL: No fever, weight loss, or fatigue  RESPIRATORY: No cough, wheezing, chills or hemoptysis; No Shortness of Breath  CARDIOVASCULAR: No chest pain, palpitations, dizziness, or leg swelling  GASTROINTESTINAL: No abdominal or epigastric pain. No nausea, vomiting, or hematemesis; No diarrhea or constipation. No melena or hematochezia.  MUSCULOSKELETAL: No joint pain or swelling; No muscle, back, or extremity pain  [x] All others negative	  [ ] Unable to obtain    PHYSICAL EXAM:  T(C): 36.6 (01-13-18 @ 05:13), Max: 36.8 (01-12-18 @ 22:00)  HR: 74 (01-13-18 @ 05:05) (70 - 94)  BP: 137/76 (01-13-18 @ 05:05) (120/81 - 144/77)  RR: 16 (01-13-18 @ 05:05) (14 - 18)  SpO2: 95% (01-13-18 @ 05:05) (95% - 99%)  Wt(kg): --  I&O's Summary    12 Jan 2018 07:01  -  13 Jan 2018 07:00  --------------------------------------------------------  IN: 3095 mL / OUT: 4080 mL / NET: -985 mL          Appearance: Normal	  HEENT:   Normal oral mucosa  Cardiovascular: Normal S1 S2, No JVD, No murmurs, No edema  Respiratory: Lungs clear to auscultation	  Gastrointestinal:  Soft, Non-tender, + BS	  Extremities: Normal range of motion, No clubbing, cyanosis or edema  Vascular: Peripheral pulses palpable 2+ bilaterally    TELEMETRY: 	    ECG:   	  RADIOLOGY:   CXR:  CT:  US:    CARDIAC TESTING:  Echocardiogram:  Catheterization:  Stress Test:      LABS:	 	    CARDIAC MARKERS:                                  11.6   5.9   )-----------( 383      ( 13 Jan 2018 06:10 )             34.5     01-13    142  |  102  |  16  ----------------------------<  99  4.0   |  28  |  1.51<H>    Ca    9.2      13 Jan 2018 06:10  Phos  3.2     01-13  Mg     2.0     01-13    TPro  6.4  /  Alb  3.1<L>  /  TBili  0.5  /  DBili  x   /  AST  27  /  ALT  28  /  AlkPhos  71  01-13    proBNP:   Lipid Profile:   HgA1c:   TSH:     ASSESSMENT/PLAN: 	    # 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  q6week TSH check  q3mo 25-OHD Vitamin D Level, Goal 50, supplement as tolerated

## 2018-01-13 NOTE — PROGRESS NOTE ADULT - SUBJECTIVE AND OBJECTIVE BOX
Patient discussed on morning rounds with Dr. Giraldo      Operation / Date: 1/9/18 Left VATS RA decortication     Surgeon: Dr. Gauthier     Referring Physician: Dr. Brown     SUBJECTIVE ASSESSMENT:  Patient seen this morning at bedside, doing well and not offering any complaints at this time. Denies any chest pain, shortness of breath or palpitations. Ambulating without difficulty and eager for discharge home today.     Hospital Course:  62 year old male, PMHx HTN, MVP (diagnosed 1999) presented to [facility] ED complaining of SOB. He was previously admitted to [facility] for pneumonia on 12/26 and was found to have a loculated pleural effusion for which a left sided chest tube was placed and he received tPA without resolution of the effusion. Patient was unhappy with his care at the outside hospital and signed out AMA and came to [facility] on 1/7/18 for further management. He was admitted to medicine team and CT surgery was consulted for VATS procedure. Preoperative workup was completed and revealed LVOT 2/2 subaortic membrane on preop TTE. Cardiology was consulted and he was cleared for surgery. Patient underwent Left VATS RA Decortication on 1/9/18. Procedure was uncomplicated and he was transferred to  post procedure. Chest tubes were removed on POD#2, 3 and 4 without any complications. Patient continued to remain hemodynamically stable, ambulating in hallways and as per Dr. Giraldo is ready for discharge home on POD#4.      Of note, patient had bump in Cr post operatively and was hydrated. Cr peaked at 1.6 and trended down, on day of discharge Cr was 1.5. Patient refusing to stay for further workup and as per Dr. Giraldo is stable for discharge home and patient agrees to follow up with his PCP within 1 week for repeat blood work.     Vital Signs Last 24 Hrs  T(C): 37.7 (13 Jan 2018 13:54), Max: 37.7 (13 Jan 2018 13:54)  T(F): 99.8 (13 Jan 2018 13:54), Max: 99.8 (13 Jan 2018 13:54)  HR: 82 (13 Jan 2018 13:04) (74 - 94)  BP: 137/74 (13 Jan 2018 13:04) (120/81 - 144/77)  BP(mean): 102 (13 Jan 2018 13:04) (90 - 116)  RR: 12 (13 Jan 2018 13:04) (12 - 18)  SpO2: 99% (13 Jan 2018 13:04) (95% - 99%)  I&O's Detail    12 Jan 2018 07:01  -  13 Jan 2018 07:00  --------------------------------------------------------  IN:    Albumin 5%  - 250 mL: 375 mL    IV PiggyBack: 350 mL    lactated ringers.: 1600 mL    Oral Fluid: 870 mL    sodium chloride 0.9%.: 900 mL  Total IN: 4095 mL    OUT:    Chest Tube: 30 mL    Voided: 4050 mL  Total OUT: 4080 mL    Total NET: 15 mL      13 Jan 2018 07:01  -  13 Jan 2018 14:08  --------------------------------------------------------  IN:    Oral Fluid: 100 mL  Total IN: 100 mL    OUT:    Voided: 550 mL  Total OUT: 550 mL    Total NET: -450 mL    EPICARDIAL WIRES REMOVED: n/a   TIE DOWNS REMOVED: No, will be removed at outpatient appointment     PHYSICAL EXAM:    General: Patient lying comfortably in bed, no acute distress     Neurological: Alert and oriented. No focal neurological deficits     Cardiovascular: S1S2, RRR, no murmurs appreciated on exam     Respiratory:     Gastrointestinal:    Extremities:    Vascular:    Incision Sites:    LABS:                        11.6   5.9   )-----------( 383      ( 13 Jan 2018 06:10 )             34.5       COUMADIN:  Yes/No.        DOSE:                  INDICATION:                GOAL INR:    PT/INR - ( 13 Jan 2018 06:10 )   PT: 12.1 sec;   INR: 1.09          PTT - ( 13 Jan 2018 06:10 )  PTT:29.4 sec    01-13    142  |  102  |  16  ----------------------------<  99  4.0   |  28  |  1.51<H>    Ca    9.2      13 Jan 2018 06:10  Phos  3.2     01-13  Mg     2.0     01-13    TPro  6.4  /  Alb  3.1<L>  /  TBili  0.5  /  DBili  x   /  AST  27  /  ALT  28  /  AlkPhos  71  01-13          MEDICATIONS  (STANDING):  docusate sodium 100 milliGRAM(s) Oral three times a day  famotidine    Tablet 20 milliGRAM(s) Oral daily  heparin  Injectable 5000 Unit(s) SubCutaneous every 8 hours  lactated ringers. 1000 milliLiter(s) (100 mL/Hr) IV Continuous <Continuous>  lidocaine   Patch 1 Patch Transdermal daily  metoprolol     tartrate 25 milliGRAM(s) Oral every 12 hours  piperacillin/tazobactam IVPB. 4.5 Gram(s) IV Intermittent every 6 hours  senna 2 Tablet(s) Oral at bedtime  sodium chloride 0.9%. 1000 milliLiter(s) (100 mL/Hr) IV Continuous <Continuous>      Discharge CXR:    Discharge ECHO: Patient discussed on morning rounds with Dr. Giraldo      Operation / Date: 1/9/18 Left VATS RA decortication     Surgeon: Dr. Gauthier     Referring Physician: Dr. Bronw     SUBJECTIVE ASSESSMENT:  Patient seen this morning at bedside, doing well and not offering any complaints at this time. Denies any chest pain, shortness of breath or palpitations. Ambulating without difficulty and eager for discharge home today.     Hospital Course:  62 year old male, PMHx HTN, MVP (diagnosed 1999) presented to [facility] ED complaining of SOB. He was previously admitted to [facility] for pneumonia on 12/26 and was found to have a loculated pleural effusion for which a left sided chest tube was placed and he received tPA without resolution of the effusion. Patient was unhappy with his care at the outside hospital and signed out AMA and came to [facility] on 1/7/18 for further management. He was admitted to medicine team and CT surgery was consulted for VATS procedure. Preoperative workup was completed and revealed LVOT 2/2 subaortic membrane on preop TTE. Cardiology was consulted and he was cleared for surgery. Patient underwent Left VATS RA Decortication on 1/9/18. Procedure was uncomplicated and he was transferred to  post procedure. Chest tubes were removed on POD#2, 3 and 4 without any complications. Patient continued to remain hemodynamically stable, ambulating in hallways and as per Dr. Giraldo is ready for discharge home on POD#4.      Of note, patient had bump in Cr post operatively and was hydrated. Cr peaked at 1.6 and trended down, on day of discharge Cr was 1.5. Patient refusing to stay for further workup and as per Dr. Giraldo is stable for discharge home and patient agrees to follow up with his PCP within 1 week for repeat blood work.     Vital Signs Last 24 Hrs  T(C): 37.7 (13 Jan 2018 13:54), Max: 37.7 (13 Jan 2018 13:54)  T(F): 99.8 (13 Jan 2018 13:54), Max: 99.8 (13 Jan 2018 13:54)  HR: 82 (13 Jan 2018 13:04) (74 - 94)  BP: 137/74 (13 Jan 2018 13:04) (120/81 - 144/77)  BP(mean): 102 (13 Jan 2018 13:04) (90 - 116)  RR: 12 (13 Jan 2018 13:04) (12 - 18)  SpO2: 99% (13 Jan 2018 13:04) (95% - 99%)  I&O's Detail    12 Jan 2018 07:01  -  13 Jan 2018 07:00  --------------------------------------------------------  IN:    Albumin 5%  - 250 mL: 375 mL    IV PiggyBack: 350 mL    lactated ringers.: 1600 mL    Oral Fluid: 870 mL    sodium chloride 0.9%.: 900 mL  Total IN: 4095 mL    OUT:    Chest Tube: 30 mL    Voided: 4050 mL  Total OUT: 4080 mL    Total NET: 15 mL      13 Jan 2018 07:01  -  13 Jan 2018 14:08  --------------------------------------------------------  IN:    Oral Fluid: 100 mL  Total IN: 100 mL    OUT:    Voided: 550 mL  Total OUT: 550 mL    Total NET: -450 mL    EPICARDIAL WIRES REMOVED: n/a   TIE DOWNS REMOVED: No, will be removed at outpatient appointment     PHYSICAL EXAM:    General: Patient lying comfortably in bed, no acute distress     Neurological: Alert and oriented. No focal neurological deficits     Cardiovascular: S1S2, RRR, no murmurs appreciated on exam     Respiratory: Clear to ausculation bilaterally     Gastrointestinal: Abdomen soft, non tender, non distended     Extremities: Warm and well perfused. No edema or calf tenderness     Vascular: Peripheral pulses 2+ bilaterally     Incision Sites: Left VATS incisions C/D/I, no drainage or surrounding erythema   Chest tube sites x 3 covered with occlusive dressing     LABS:                        11.6   5.9   )-----------( 383      ( 13 Jan 2018 06:10 )             34.5       COUMADIN:  No    PT/INR - ( 13 Jan 2018 06:10 )   PT: 12.1 sec;   INR: 1.09          PTT - ( 13 Jan 2018 06:10 )  PTT:29.4 sec    01-13    142  |  102  |  16  ----------------------------<  99  4.0   |  28  |  1.51<H>    Ca    9.2      13 Jan 2018 06:10  Phos  3.2     01-13  Mg     2.0     01-13    TPro  6.4  /  Alb  3.1<L>  /  TBili  0.5  /  DBili  x   /  AST  27  /  ALT  28  /  AlkPhos  71  01-13          MEDICATIONS  (STANDING): See Med Rec     Discharge CXR: < from: Xray Chest 1 View AP-PORTABLE IMMEDIATE (01.13.18 @ 12:21) >  No interval change lung pathology    < end of copied text >    Discharge ECHO: < from: Echocardiogram (01.08.18 @ 11:10) >  The left atrial size is normal. Right atrial size is normal.There is mild   aortic valve thickening. Milldy calcified aortic valve. There is trace   to mild   aortic regurgitation.  There is flow accerleration near the aortic valve   with   LVOT obstruction. A vague linear echodensity visualized below the aortic   valve   which could represent subaortic membrane. This appears to be a fixed   obstruction. It is difficult to discern the level of stenosis due to   sequential obstructions.The peak pressure gradient is 35 mmHg.  The mean   pressure gradient is 19 mmHg  There is mild mitral valve thickening.   There is   trace mitral regurgitation.Structurally normal tricuspid valve. There is   mild   tricuspid regurgitation.The pulmonary artery systolic pressure is   estimated to   be 26 mmHg.The pulmonic valve is not well visualized. There is trace   pulmonic   regurgitation.  The right ventricle is normal in size and function.There   is   mild concentric left ventricular hypertrophy. The left ventricular wall   motion   is normal. The left ventricle is hyperdynamic and the overall ejection   fraction is increased (>75%). There is a peak dynamic mid-cavitary   gradient of   41 mmHg. No aortic root dilatation.  There is no pericardial effusion.No   prior   study for comparison.Suggest KERWIN to better visualize the aortic valve and   evaluate for a possible subaortic membrane.   Above findings were   discussed   with Dr. Diaz.    < end of copied text >    - Please follow up with Dr. Gauthier on 1/18/18 at 1:45pm.  The office is located at [facility], [facility], 4th floor. Call us with any questions #[phone].      - Please keep your chest tube dressings clean and dry until you see Dr. Gauthier in the office. You may shower but please keep the dressings intact until your follow up appointment. You also have staples at your incision site that will be removed at your appointment.   - You have been diagnosed with Pneumonia, please continue Augmentin 875mg twice daily for 14 days. Please follow up with Dr. Brown (pulmonologist) within 1-2 weeks from discharge.     - You have been diagnosed with Left ventricular outflow tract obstruction, you were evaluated by cardiology and cleared for surgery and started on metoprolol 25mg twice daily. Please follow up with Dr. Jorgensen (cardiologist) within 1-2 weeks from discharge, or you may follow up with a cardiologist in [place].     - Your kidney function is assessed based off your creatinine levels, your creatinine since surgery has been elevated since surgery. Today on day of discharge your BUN/Creatinine is 16/1.51 respectively. Please follow up with your primary care provider within 1 week to have repeat blood work and recheck your creatinine levels.

## 2018-01-14 LAB — SURGICAL PATHOLOGY STUDY: SIGNIFICANT CHANGE UP

## 2018-01-17 ENCOUNTER — FORM ENCOUNTER (OUTPATIENT)
Age: 63
End: 2018-01-17

## 2018-01-18 ENCOUNTER — OUTPATIENT (OUTPATIENT)
Dept: OUTPATIENT SERVICES | Facility: HOSPITAL | Age: 63
LOS: 1 days | End: 2018-01-18
Payer: COMMERCIAL

## 2018-01-18 ENCOUNTER — APPOINTMENT (OUTPATIENT)
Dept: THORACIC SURGERY | Facility: CLINIC | Age: 63
End: 2018-01-18
Payer: MEDICAID

## 2018-01-18 VITALS — HEIGHT: 70 IN | BODY MASS INDEX: 28.55 KG/M2

## 2018-01-18 VITALS
SYSTOLIC BLOOD PRESSURE: 153 MMHG | OXYGEN SATURATION: 98 % | DIASTOLIC BLOOD PRESSURE: 73 MMHG | HEART RATE: 84 BPM | WEIGHT: 199 LBS | RESPIRATION RATE: 19 BRPM | TEMPERATURE: 98.1 F

## 2018-01-18 DIAGNOSIS — Z93.8 OTHER ARTIFICIAL OPENING STATUS: Chronic | ICD-10-CM

## 2018-01-18 LAB — VIRUS SPEC CULT: SIGNIFICANT CHANGE UP

## 2018-01-18 PROCEDURE — 99024 POSTOP FOLLOW-UP VISIT: CPT

## 2018-01-18 PROCEDURE — 71046 X-RAY EXAM CHEST 2 VIEWS: CPT

## 2018-01-18 PROCEDURE — 71046 X-RAY EXAM CHEST 2 VIEWS: CPT | Mod: 26

## 2018-01-18 RX ORDER — METOPROLOL TARTRATE 25 MG/1
25 TABLET, FILM COATED ORAL
Qty: 60 | Refills: 3 | Status: COMPLETED | COMMUNITY
Start: 2018-01-17 | End: 2018-01-18

## 2018-01-25 ENCOUNTER — TRANSCRIPTION ENCOUNTER (OUTPATIENT)
Age: 63
End: 2018-01-25

## 2018-01-25 DIAGNOSIS — J86.9 PYOTHORAX WITHOUT FISTULA: ICD-10-CM

## 2018-01-25 DIAGNOSIS — J90 PLEURAL EFFUSION, NOT ELSEWHERE CLASSIFIED: ICD-10-CM

## 2018-01-25 DIAGNOSIS — D64.9 ANEMIA, UNSPECIFIED: ICD-10-CM

## 2018-01-25 DIAGNOSIS — E66.9 OBESITY, UNSPECIFIED: ICD-10-CM

## 2018-01-25 DIAGNOSIS — I08.8 OTHER RHEUMATIC MULTIPLE VALVE DISEASES: ICD-10-CM

## 2018-01-25 DIAGNOSIS — J18.9 PNEUMONIA, UNSPECIFIED ORGANISM: ICD-10-CM

## 2018-01-25 DIAGNOSIS — D47.3 ESSENTIAL (HEMORRHAGIC) THROMBOCYTHEMIA: ICD-10-CM

## 2018-01-25 DIAGNOSIS — I25.10 ATHEROSCLEROTIC HEART DISEASE OF NATIVE CORONARY ARTERY WITHOUT ANGINA PECTORIS: ICD-10-CM

## 2018-01-25 DIAGNOSIS — I10 ESSENTIAL (PRIMARY) HYPERTENSION: ICD-10-CM

## 2018-02-01 ENCOUNTER — APPOINTMENT (OUTPATIENT)
Dept: PULMONOLOGY | Facility: CLINIC | Age: 63
End: 2018-02-01
Payer: MEDICAID

## 2018-02-01 VITALS
SYSTOLIC BLOOD PRESSURE: 132 MMHG | HEART RATE: 84 BPM | HEIGHT: 70 IN | TEMPERATURE: 98.4 F | OXYGEN SATURATION: 98 % | DIASTOLIC BLOOD PRESSURE: 78 MMHG

## 2018-02-01 PROCEDURE — 36415 COLL VENOUS BLD VENIPUNCTURE: CPT

## 2018-02-01 PROCEDURE — 99215 OFFICE O/P EST HI 40 MIN: CPT | Mod: 25

## 2018-02-01 RX ORDER — ACETAMINOPHEN 325 MG/1
325 TABLET ORAL
Qty: 120 | Refills: 0 | Status: DISCONTINUED | COMMUNITY
Start: 2018-01-17 | End: 2018-02-01

## 2018-02-03 LAB
ALBUMIN SERPL ELPH-MCNC: 4.6 G/DL
ALP BLD-CCNC: 73 U/L
ALT SERPL-CCNC: 19 U/L
ANION GAP SERPL CALC-SCNC: 22 MMOL/L
AST SERPL-CCNC: 21 U/L
BASOPHILS # BLD AUTO: 0.03 K/UL
BASOPHILS NFR BLD AUTO: 0.4 %
BILIRUB SERPL-MCNC: 0.6 MG/DL
BUN SERPL-MCNC: 15 MG/DL
CALCIUM SERPL-MCNC: 9.9 MG/DL
CHLORIDE SERPL-SCNC: 101 MMOL/L
CO2 SERPL-SCNC: 24 MMOL/L
CREAT SERPL-MCNC: 1.3 MG/DL
CRP SERPL-MCNC: 0.2 MG/DL
EOSINOPHIL # BLD AUTO: 0.28 K/UL
EOSINOPHIL NFR BLD AUTO: 3.3 %
ERYTHROCYTE [SEDIMENTATION RATE] IN BLOOD BY WESTERGREN METHOD: 7 MM/HR
GLUCOSE SERPL-MCNC: 91 MG/DL
HCT VFR BLD CALC: 41.4 %
HGB BLD-MCNC: 12.5 G/DL
IMM GRANULOCYTES NFR BLD AUTO: 0.1 %
LYMPHOCYTES # BLD AUTO: 2.56 K/UL
LYMPHOCYTES NFR BLD AUTO: 30.3 %
MAN DIFF?: NORMAL
MCHC RBC-ENTMCNC: 29.8 PG
MCHC RBC-ENTMCNC: 30.2 GM/DL
MCV RBC AUTO: 98.6 FL
MONOCYTES # BLD AUTO: 0.42 K/UL
MONOCYTES NFR BLD AUTO: 5 %
NEUTROPHILS # BLD AUTO: 5.16 K/UL
NEUTROPHILS NFR BLD AUTO: 60.9 %
NT-PROBNP SERPL-MCNC: 244 PG/ML
PLATELET # BLD AUTO: 270 K/UL
POTASSIUM SERPL-SCNC: 4.9 MMOL/L
PROCALCITONIN SERPL-MCNC: 0.07 NG/ML
PROT SERPL-MCNC: 7.5 G/DL
RBC # BLD: 4.2 M/UL
RBC # FLD: 14.6 %
SODIUM SERPL-SCNC: 147 MMOL/L
WBC # FLD AUTO: 8.46 K/UL

## 2018-02-07 ENCOUNTER — FORM ENCOUNTER (OUTPATIENT)
Age: 63
End: 2018-02-07

## 2018-02-07 ENCOUNTER — RESULT REVIEW (OUTPATIENT)
Age: 63
End: 2018-02-07

## 2018-02-07 LAB
CULTURE RESULTS: SIGNIFICANT CHANGE UP
SPECIMEN SOURCE: SIGNIFICANT CHANGE UP

## 2018-02-08 ENCOUNTER — APPOINTMENT (OUTPATIENT)
Dept: THORACIC SURGERY | Facility: CLINIC | Age: 63
End: 2018-02-08
Payer: MEDICAID

## 2018-02-08 ENCOUNTER — OUTPATIENT (OUTPATIENT)
Dept: OUTPATIENT SERVICES | Facility: HOSPITAL | Age: 63
LOS: 1 days | End: 2018-02-08
Payer: COMMERCIAL

## 2018-02-08 VITALS
DIASTOLIC BLOOD PRESSURE: 66 MMHG | BODY MASS INDEX: 30.42 KG/M2 | OXYGEN SATURATION: 95 % | HEART RATE: 92 BPM | TEMPERATURE: 99.2 F | RESPIRATION RATE: 20 BRPM | SYSTOLIC BLOOD PRESSURE: 139 MMHG | WEIGHT: 212 LBS

## 2018-02-08 DIAGNOSIS — Z93.8 OTHER ARTIFICIAL OPENING STATUS: Chronic | ICD-10-CM

## 2018-02-08 PROCEDURE — 99024 POSTOP FOLLOW-UP VISIT: CPT

## 2018-02-08 PROCEDURE — 71046 X-RAY EXAM CHEST 2 VIEWS: CPT

## 2018-02-08 PROCEDURE — 71046 X-RAY EXAM CHEST 2 VIEWS: CPT | Mod: 26

## 2018-02-09 RX ORDER — AMOXICILLIN AND CLAVULANATE POTASSIUM 875; 125 MG/1; 1/1
875-125 TABLET, FILM COATED ORAL
Refills: 0 | Status: COMPLETED | COMMUNITY
Start: 2018-01-17 | End: 2018-02-09

## 2018-02-14 ENCOUNTER — APPOINTMENT (OUTPATIENT)
Dept: HEART AND VASCULAR | Facility: CLINIC | Age: 63
End: 2018-02-14
Payer: MEDICAID

## 2018-02-14 VITALS
DIASTOLIC BLOOD PRESSURE: 72 MMHG | HEIGHT: 70 IN | WEIGHT: 204 LBS | HEART RATE: 93 BPM | BODY MASS INDEX: 29.2 KG/M2 | OXYGEN SATURATION: 97 % | SYSTOLIC BLOOD PRESSURE: 123 MMHG

## 2018-02-14 PROCEDURE — 93000 ELECTROCARDIOGRAM COMPLETE: CPT

## 2018-02-14 PROCEDURE — 99215 OFFICE O/P EST HI 40 MIN: CPT | Mod: 25

## 2018-02-14 RX ORDER — AMLODIPINE BESYLATE 10 MG/1
10 TABLET ORAL DAILY
Qty: 90 | Refills: 3 | Status: DISCONTINUED | COMMUNITY
Start: 2018-01-18 | End: 2018-02-14

## 2018-02-28 LAB
CULTURE RESULTS: SIGNIFICANT CHANGE UP
SPECIMEN SOURCE: SIGNIFICANT CHANGE UP

## 2018-03-05 ENCOUNTER — FORM ENCOUNTER (OUTPATIENT)
Age: 63
End: 2018-03-05

## 2018-03-06 ENCOUNTER — APPOINTMENT (OUTPATIENT)
Dept: CT IMAGING | Facility: HOSPITAL | Age: 63
End: 2018-03-06

## 2018-03-06 ENCOUNTER — APPOINTMENT (OUTPATIENT)
Dept: PULMONOLOGY | Facility: CLINIC | Age: 63
End: 2018-03-06

## 2018-03-06 ENCOUNTER — OUTPATIENT (OUTPATIENT)
Dept: OUTPATIENT SERVICES | Facility: HOSPITAL | Age: 63
LOS: 1 days | End: 2018-03-06
Payer: COMMERCIAL

## 2018-03-06 DIAGNOSIS — Z93.8 OTHER ARTIFICIAL OPENING STATUS: Chronic | ICD-10-CM

## 2018-03-06 PROCEDURE — 71250 CT THORAX DX C-: CPT | Mod: 26

## 2018-03-06 PROCEDURE — 71250 CT THORAX DX C-: CPT

## 2018-03-08 ENCOUNTER — APPOINTMENT (OUTPATIENT)
Dept: THORACIC SURGERY | Facility: CLINIC | Age: 63
End: 2018-03-08

## 2018-03-15 ENCOUNTER — APPOINTMENT (OUTPATIENT)
Dept: THORACIC SURGERY | Facility: CLINIC | Age: 63
End: 2018-03-15

## 2018-03-15 ENCOUNTER — APPOINTMENT (OUTPATIENT)
Dept: THORACIC SURGERY | Facility: CLINIC | Age: 63
End: 2018-03-15
Payer: MEDICAID

## 2018-03-15 VITALS
SYSTOLIC BLOOD PRESSURE: 142 MMHG | HEART RATE: 66 BPM | RESPIRATION RATE: 18 BRPM | DIASTOLIC BLOOD PRESSURE: 70 MMHG | OXYGEN SATURATION: 97 %

## 2018-03-15 VITALS
TEMPERATURE: 97.8 F | HEART RATE: 66 BPM | RESPIRATION RATE: 18 BRPM | OXYGEN SATURATION: 97 % | BODY MASS INDEX: 29.99 KG/M2 | DIASTOLIC BLOOD PRESSURE: 70 MMHG | SYSTOLIC BLOOD PRESSURE: 142 MMHG | WEIGHT: 209 LBS

## 2018-03-15 DIAGNOSIS — N28.9 DISORDER OF KIDNEY AND URETER, UNSPECIFIED: ICD-10-CM

## 2018-03-15 DIAGNOSIS — Z98.890 OTHER SPECIFIED POSTPROCEDURAL STATES: ICD-10-CM

## 2018-03-15 DIAGNOSIS — Z09 ENCOUNTER FOR FOLLOW-UP EXAMINATION AFTER COMPLETED TREATMENT FOR CONDITIONS OTHER THAN MALIGNANT NEOPLASM: ICD-10-CM

## 2018-03-15 PROCEDURE — 99024 POSTOP FOLLOW-UP VISIT: CPT

## 2018-03-16 PROBLEM — Z09 POSTOP CHECK: Status: ACTIVE | Noted: 2018-03-16

## 2018-03-18 ENCOUNTER — FORM ENCOUNTER (OUTPATIENT)
Age: 63
End: 2018-03-18

## 2018-03-19 ENCOUNTER — OTHER (OUTPATIENT)
Age: 63
End: 2018-03-19

## 2018-03-19 ENCOUNTER — OUTPATIENT (OUTPATIENT)
Dept: OUTPATIENT SERVICES | Facility: HOSPITAL | Age: 63
LOS: 1 days | End: 2018-03-19
Payer: COMMERCIAL

## 2018-03-19 DIAGNOSIS — Z93.8 OTHER ARTIFICIAL OPENING STATUS: Chronic | ICD-10-CM

## 2018-03-19 DIAGNOSIS — Q24.4 CONGENITAL SUBAORTIC STENOSIS: ICD-10-CM

## 2018-03-19 PROBLEM — N28.9 RENAL LESION: Status: ACTIVE | Noted: 2018-03-19

## 2018-03-19 PROCEDURE — 93312 ECHO TRANSESOPHAGEAL: CPT

## 2018-03-20 ENCOUNTER — APPOINTMENT (OUTPATIENT)
Dept: PULMONOLOGY | Facility: CLINIC | Age: 63
End: 2018-03-20
Payer: MEDICAID

## 2018-03-20 VITALS
WEIGHT: 209 LBS | HEART RATE: 72 BPM | DIASTOLIC BLOOD PRESSURE: 80 MMHG | BODY MASS INDEX: 29.92 KG/M2 | SYSTOLIC BLOOD PRESSURE: 140 MMHG | TEMPERATURE: 97 F | OXYGEN SATURATION: 98 % | HEIGHT: 70 IN

## 2018-03-20 PROCEDURE — 99214 OFFICE O/P EST MOD 30 MIN: CPT

## 2018-04-05 ENCOUNTER — APPOINTMENT (OUTPATIENT)
Dept: PULMONOLOGY | Facility: CLINIC | Age: 63
End: 2018-04-05

## 2018-04-11 ENCOUNTER — APPOINTMENT (OUTPATIENT)
Dept: CARDIOTHORACIC SURGERY | Facility: CLINIC | Age: 63
End: 2018-04-11
Payer: MEDICAID

## 2018-04-11 VITALS
RESPIRATION RATE: 16 BRPM | DIASTOLIC BLOOD PRESSURE: 68 MMHG | HEART RATE: 59 BPM | WEIGHT: 209 LBS | HEIGHT: 70 IN | OXYGEN SATURATION: 98 % | TEMPERATURE: 97.5 F | BODY MASS INDEX: 29.92 KG/M2 | SYSTOLIC BLOOD PRESSURE: 139 MMHG

## 2018-04-11 DIAGNOSIS — Z82.49 FAMILY HISTORY OF ISCHEMIC HEART DISEASE AND OTHER DISEASES OF THE CIRCULATORY SYSTEM: ICD-10-CM

## 2018-04-11 DIAGNOSIS — Z82.5 FAMILY HISTORY OF ASTHMA AND OTHER CHRONIC LOWER RESPIRATORY DISEASES: ICD-10-CM

## 2018-04-11 DIAGNOSIS — Z82.3 FAMILY HISTORY OF STROKE: ICD-10-CM

## 2018-04-11 PROCEDURE — 99215 OFFICE O/P EST HI 40 MIN: CPT

## 2018-04-12 ENCOUNTER — OTHER (OUTPATIENT)
Age: 63
End: 2018-04-12

## 2018-04-12 ENCOUNTER — FORM ENCOUNTER (OUTPATIENT)
Age: 63
End: 2018-04-12

## 2018-04-12 PROBLEM — Z82.49 FAMILY HISTORY OF ATRIAL FIBRILLATION: Status: ACTIVE | Noted: 2018-04-12

## 2018-04-12 PROBLEM — Z82.49 FAMILY HISTORY OF VASCULITIS: Status: ACTIVE | Noted: 2018-04-12

## 2018-04-12 PROBLEM — Z82.5 FAMILY HISTORY OF ASTHMA: Status: ACTIVE | Noted: 2018-04-12

## 2018-04-12 PROBLEM — Z82.3 FAMILY HISTORY OF CEREBROVASCULAR ACCIDENT (CVA): Status: ACTIVE | Noted: 2018-04-12

## 2018-04-13 ENCOUNTER — APPOINTMENT (OUTPATIENT)
Dept: HEART AND VASCULAR | Facility: CLINIC | Age: 63
End: 2018-04-13
Payer: MEDICAID

## 2018-04-13 ENCOUNTER — APPOINTMENT (OUTPATIENT)
Dept: SLEEP CENTER | Facility: HOSPITAL | Age: 63
End: 2018-04-13

## 2018-04-13 ENCOUNTER — OTHER (OUTPATIENT)
Age: 63
End: 2018-04-13

## 2018-04-13 ENCOUNTER — OUTPATIENT (OUTPATIENT)
Dept: OUTPATIENT SERVICES | Facility: HOSPITAL | Age: 63
LOS: 1 days | End: 2018-04-13
Payer: COMMERCIAL

## 2018-04-13 VITALS — OXYGEN SATURATION: 96 % | DIASTOLIC BLOOD PRESSURE: 71 MMHG | SYSTOLIC BLOOD PRESSURE: 160 MMHG

## 2018-04-13 DIAGNOSIS — J18.9 PNEUMONIA, UNSPECIFIED ORGANISM: ICD-10-CM

## 2018-04-13 DIAGNOSIS — I49.3 VENTRICULAR PREMATURE DEPOLARIZATION: ICD-10-CM

## 2018-04-13 DIAGNOSIS — Q24.4 CONGENITAL SUBAORTIC STENOSIS: ICD-10-CM

## 2018-04-13 DIAGNOSIS — Z93.8 OTHER ARTIFICIAL OPENING STATUS: Chronic | ICD-10-CM

## 2018-04-13 DIAGNOSIS — Z98.890 OTHER SPECIFIED POSTPROCEDURAL STATES: ICD-10-CM

## 2018-04-13 PROCEDURE — 99214 OFFICE O/P EST MOD 30 MIN: CPT

## 2018-04-13 PROCEDURE — 93018 CV STRESS TEST I&R ONLY: CPT

## 2018-04-13 PROCEDURE — 93350 STRESS TTE ONLY: CPT | Mod: 26

## 2018-04-13 PROCEDURE — 93016 CV STRESS TEST SUPVJ ONLY: CPT

## 2018-04-13 PROCEDURE — 93325 DOPPLER ECHO COLOR FLOW MAPG: CPT | Mod: 26

## 2018-04-13 PROCEDURE — 93351 STRESS TTE COMPLETE: CPT

## 2018-04-13 PROCEDURE — 93320 DOPPLER ECHO COMPLETE: CPT | Mod: 26

## 2018-04-13 RX ORDER — METOPROLOL TARTRATE 50 MG/1
50 TABLET, FILM COATED ORAL
Qty: 180 | Refills: 3 | Status: DISCONTINUED | COMMUNITY
Start: 2018-02-14 | End: 2018-04-13

## 2018-04-16 LAB
24R-OH-CALCIDIOL SERPL-MCNC: 32.7 PG/ML
ALBUMIN SERPL ELPH-MCNC: 4.4 G/DL
ALP BLD-CCNC: 54 U/L
ALT SERPL-CCNC: 12 U/L
ANION GAP SERPL CALC-SCNC: 18 MMOL/L
AST SERPL-CCNC: 13 U/L
BILIRUB SERPL-MCNC: 0.7 MG/DL
BUN SERPL-MCNC: 18 MG/DL
CALCIUM SERPL-MCNC: 9.3 MG/DL
CHLORIDE SERPL-SCNC: 105 MMOL/L
CHOLEST SERPL-MCNC: 160 MG/DL
CHOLEST/HDLC SERPL: 4.4 RATIO
CO2 SERPL-SCNC: 20 MMOL/L
CREAT SERPL-MCNC: 1.18 MG/DL
GLUCOSE SERPL-MCNC: 89 MG/DL
HBA1C MFR BLD HPLC: 5.2 %
HDLC SERPL-MCNC: 36 MG/DL
LDLC SERPL CALC-MCNC: 102 MG/DL
MAGNESIUM SERPL-MCNC: 2 MG/DL
POTASSIUM SERPL-SCNC: 4.4 MMOL/L
PROT SERPL-MCNC: 6.8 G/DL
SODIUM SERPL-SCNC: 143 MMOL/L
TRIGL SERPL-MCNC: 111 MG/DL

## 2018-05-01 ENCOUNTER — CLINICAL ADVICE (OUTPATIENT)
Age: 63
End: 2018-05-01

## 2018-05-15 LAB
ANION GAP SERPL CALC-SCNC: 13 MMOL/L
BUN SERPL-MCNC: 21 MG/DL
CALCIUM SERPL-MCNC: 9.7 MG/DL
CHLORIDE SERPL-SCNC: 102 MMOL/L
CO2 SERPL-SCNC: 27 MMOL/L
CREAT SERPL-MCNC: 1.12 MG/DL
GLUCOSE SERPL-MCNC: 96 MG/DL
POTASSIUM SERPL-SCNC: 5 MMOL/L
SODIUM SERPL-SCNC: 142 MMOL/L

## 2018-07-22 PROBLEM — Z98.890 STATUS POST LUNG SURGERY: Status: ACTIVE | Noted: 2018-03-16

## 2018-07-26 ENCOUNTER — APPOINTMENT (OUTPATIENT)
Dept: PULMONOLOGY | Facility: CLINIC | Age: 63
End: 2018-07-26
Payer: MEDICAID

## 2018-07-26 VITALS
BODY MASS INDEX: 38.51 KG/M2 | DIASTOLIC BLOOD PRESSURE: 70 MMHG | SYSTOLIC BLOOD PRESSURE: 130 MMHG | WEIGHT: 204 LBS | HEART RATE: 57 BPM | OXYGEN SATURATION: 97 % | HEIGHT: 61.2 IN | TEMPERATURE: 98.2 F

## 2018-07-26 PROCEDURE — 99215 OFFICE O/P EST HI 40 MIN: CPT

## 2018-07-27 NOTE — ED PROVIDER NOTE - MEDICAL DECISION MAKING DETAILS
cxr left pleural effusion.  shelley pulmiguel angel here, will see patient and maybe drain tomorrow, requesting ct chest n/a

## 2018-09-11 ENCOUNTER — FORM ENCOUNTER (OUTPATIENT)
Age: 63
End: 2018-09-11

## 2018-09-12 ENCOUNTER — APPOINTMENT (OUTPATIENT)
Dept: CT IMAGING | Facility: HOSPITAL | Age: 63
End: 2018-09-12
Payer: MEDICAID

## 2018-09-12 ENCOUNTER — OUTPATIENT (OUTPATIENT)
Dept: OUTPATIENT SERVICES | Facility: HOSPITAL | Age: 63
LOS: 1 days | End: 2018-09-12
Payer: MEDICAID

## 2018-09-12 DIAGNOSIS — Z93.8 OTHER ARTIFICIAL OPENING STATUS: Chronic | ICD-10-CM

## 2018-09-12 PROCEDURE — 71250 CT THORAX DX C-: CPT

## 2018-09-12 PROCEDURE — 71250 CT THORAX DX C-: CPT | Mod: 26

## 2018-09-19 ENCOUNTER — RESULT REVIEW (OUTPATIENT)
Age: 63
End: 2018-09-19

## 2018-09-20 ENCOUNTER — APPOINTMENT (OUTPATIENT)
Dept: THORACIC SURGERY | Facility: CLINIC | Age: 63
End: 2018-09-20

## 2018-10-04 ENCOUNTER — APPOINTMENT (OUTPATIENT)
Dept: THORACIC SURGERY | Facility: CLINIC | Age: 63
End: 2018-10-04

## 2018-10-17 ENCOUNTER — APPOINTMENT (OUTPATIENT)
Dept: CARDIOTHORACIC SURGERY | Facility: CLINIC | Age: 63
End: 2018-10-17
Payer: MEDICAID

## 2018-10-23 ENCOUNTER — APPOINTMENT (OUTPATIENT)
Dept: PULMONOLOGY | Facility: CLINIC | Age: 63
End: 2018-10-23
Payer: MEDICAID

## 2018-10-23 VITALS
HEIGHT: 61.2 IN | WEIGHT: 206.38 LBS | TEMPERATURE: 98.8 F | OXYGEN SATURATION: 96 % | HEART RATE: 71 BPM | DIASTOLIC BLOOD PRESSURE: 70 MMHG | SYSTOLIC BLOOD PRESSURE: 120 MMHG | BODY MASS INDEX: 38.96 KG/M2

## 2018-10-23 PROCEDURE — G0008: CPT

## 2018-10-23 PROCEDURE — 90686 IIV4 VACC NO PRSV 0.5 ML IM: CPT

## 2018-10-23 PROCEDURE — 99215 OFFICE O/P EST HI 40 MIN: CPT | Mod: 25

## 2018-10-25 NOTE — PHYSICAL EXAM
[General Appearance - Well Developed] : well developed [Normal Appearance] : normal appearance [Well Groomed] : well groomed [General Appearance - Well Nourished] : well nourished [General Appearance - In No Acute Distress] : no acute distress [Normal Conjunctiva] : the conjunctiva exhibited no abnormalities [Neck Appearance] : the appearance of the neck was normal [Neck Circumference: ___] : neck circumference is [unfilled] [Heart Rate And Rhythm] : heart rate and rhythm were normal [Heart Sounds] : normal S1 and S2 [Respiration, Rhythm And Depth] : normal respiratory rhythm and effort [Exaggerated Use Of Accessory Muscles For Inspiration] : no accessory muscle use [Auscultation Breath Sounds / Voice Sounds] : lungs were clear to auscultation bilaterally [Surgical scars] : surgical scars [Abdomen Soft] : soft [Abnormal Walk] : normal gait [Gait - Sufficient For Exercise Testing] : the gait was sufficient for exercise testing [Nail Clubbing] : no clubbing of the fingernails [Cyanosis, Localized] : no localized cyanosis [No Focal Deficits] : no focal deficits [Oriented To Time, Place, And Person] : oriented to person, place, and time [Impaired Insight] : insight and judgment were intact [Affect] : the affect was normal [Memory Recent] : recent memory was not impaired [II] : II [] : no hepato-splenomegaly [FreeTextEntry1] : normal  [FreeTextEntry2] :  No pedal edema.

## 2018-10-25 NOTE — HISTORY OF PRESENT ILLNESS
[Difficulty Breathing During Exertion] : improved dyspnea on exertion [Feelings Of Weakness On Exertion] : improved exercise intolerance [Chest Pain Or Discomfort] : denies chest pain [Fever] : denies fever [Cough] : coughing [FreeTextEntry1] : Pt is 62 yo M with PMH HTN, MVP (dx 1999), left sided pneumonia with development of empyema s/p left VATS decortication on 1/9/18 with Dr. Gauthier.  Pathology revealed fibrous tissue and blood clot with acute inflammation.\par \par Patient is feeling well, walks every day at same distance, stops frequently, walks slowly.   He is able to take deep breaths.     No fevers; night sweats occurring requiring 3 changes of cloths during night.   Appetite is good.    \par \par 10/23/18:\par He states he started feeling like he was coming down with respiratory tract infection about two weeks ago. \par He has had a little more fatigue. Has cough but non-productive. Has ephemeral intermittent lightheadedness.  Denies fevers, change in appetite, no chills, chest pain, ear pain, sore throat, nasal congestion, hemoptysis.  \par Two weeks ago, he walked down a large hill, then pushed a shopping cart up the same hill without any shortness of breath, which he has not been able to do in the past. \par He has cardiac MR for Dr. Figueroa scheduled. \par PFT testing, including CPET, and sleep study were not approved by his insurance and were not done.

## 2018-10-25 NOTE — CONSULT LETTER
[Dear  ___] : Dear  [unfilled], [Consult Letter:] : I had the pleasure of evaluating your patient, [unfilled]. [Please see my note below.] : Please see my note below. [Consult Closing:] : Thank you very much for allowing me to participate in the care of this patient.  If you have any questions, please do not hesitate to contact me. [Sincerely,] : Sincerely, [DrChey  ___] : Dr. MOLINA [FreeTextEntry2] : Tess Forte MD [FreeTextEntry3] : Jaret Brown MD

## 2018-10-25 NOTE — PROCEDURE
[FreeTextEntry1] : EXAM: CT CHEST PROCEDURE DATE: 09/12/2018 \par INTERPRETATION: CT Chest without intravenous contrast \par History: History of left lower lobe pneumonia and empyema post decortication. Six-month follow-up. \par Comparison: CT chest 3/6/2018. \par FINDINGS: \par Lungs and large airways: Improvement in linear atelectasis within the lingula. There is curvilinear atelectasis unchanged in extent involving the lateral and posterior basal segment of the left lower lobe slightly improved. Scattered tree-in-bud centrilobular micronodules are noted compatible with mild small airway inflammation. No significant mosaic perfusion to suggest air-trapping. Stable 5 mm. Fissural right middle lobe nodule seen on image 156, series 3. \par Pleura: There is interval resolution of left-sided pleural effusion. No right pleural effusion. \par Lymph nodes: No thoracic lymphadenopathy. \par Mediastinum: Heart size is normal. No pericardial effusion. Mild coronary artery calcification. \par Vessels: Borderline aneurysmal dilatation of the ascending aorta measuring 4.0 cm. \par Chest wall and lower neck: Mild symmetric gynecomastia. \par Upper abdomen: Unremarkable. \par Bones: Partial visualization of marked ankylosis of lower cervical vertebral bodies, unchanged. \par \par IMPRESSION: \par 1. Interval resolution of left pleural effusion. \par 2. Borderline aneurysmal dilatation of the ascending aorta. \par 3. Improving linear atelectasis \par 4. Mild small airway inflammation without air trapping.

## 2018-10-25 NOTE — DISCUSSION/SUMMARY
[FreeTextEntry1] : Attending's Summary \par 10-23-18\par -No oral thrush, no oropharyngeal lesions.   No nasal discharge, no polyps, nasal mucosa is mildly inflamed\par -Thyroid not palpable.    No cervical adenopathy.     No JVD at 45 degrees, no hepatojugular reflux elicited \par -2/6 systolic murmur at aortic area left sternal border \par -very good air entry bilaterally, no wheezing, rhonchi or crackles \par -no hepatosplenomegaly\par -no edema

## 2018-10-25 NOTE — ASSESSMENT
[FreeTextEntry1] : 10-23-18\par 1.  Decortication of the lung s/p empyema\par I have reviewed his last CT chest 9/12/18.   There is no evidence of loculated effusions.   There is no radiographic evidence of any abnormality at the left base.   Clinically the patient is asymptomatic and is able to carry heavy loads up a hill without experiencing any dyspnea.    We have advised him to continue to exercise on a daily basis to build up his exercise induration. \par 2.   Subaortic membrane stenosis \par The patient does have aortic regurgitation with subaortic membrane on the echo.    Dr. Figueroa is following him for this. \par 3.   CLAIRE\par I have had multiple discussions with Souleymane about having a sleep study.    Unfortunately his insurance has not authorized this test to be done at [facility].   I have explained to him the need to get this done.   He will reach out to his insurance company \par 4.    Health maintenance\par Flu vaccine administered in office today (10/23/18).\par \par Return 3 months

## 2018-10-25 NOTE — REVIEW OF SYSTEMS
[As Noted in HPI] : as noted in HPI [Cough] : cough [Dyspnea] : dyspnea [Negative] : Endocrine [FreeTextEntry3] : drenching night sweats

## 2018-10-26 ENCOUNTER — FORM ENCOUNTER (OUTPATIENT)
Age: 63
End: 2018-10-26

## 2018-10-27 ENCOUNTER — OUTPATIENT (OUTPATIENT)
Dept: OUTPATIENT SERVICES | Facility: HOSPITAL | Age: 63
LOS: 1 days | End: 2018-10-27
Payer: COMMERCIAL

## 2018-10-27 ENCOUNTER — APPOINTMENT (OUTPATIENT)
Dept: MRI IMAGING | Facility: CLINIC | Age: 63
End: 2018-10-27
Payer: MEDICAID

## 2018-10-27 DIAGNOSIS — Z93.8 OTHER ARTIFICIAL OPENING STATUS: Chronic | ICD-10-CM

## 2018-10-27 PROCEDURE — 75565 CARD MRI VELOC FLOW MAPPING: CPT | Mod: 26

## 2018-10-27 PROCEDURE — 75561 CARDIAC MRI FOR MORPH W/DYE: CPT | Mod: 26

## 2018-10-27 PROCEDURE — 75561 CARDIAC MRI FOR MORPH W/DYE: CPT

## 2018-10-27 PROCEDURE — 75565 CARD MRI VELOC FLOW MAPPING: CPT

## 2018-10-31 ENCOUNTER — APPOINTMENT (OUTPATIENT)
Dept: CARDIOTHORACIC SURGERY | Facility: CLINIC | Age: 63
End: 2018-10-31
Payer: MEDICAID

## 2018-10-31 VITALS
DIASTOLIC BLOOD PRESSURE: 68 MMHG | BODY MASS INDEX: 38.29 KG/M2 | TEMPERATURE: 97.4 F | OXYGEN SATURATION: 98 % | WEIGHT: 204 LBS | SYSTOLIC BLOOD PRESSURE: 131 MMHG | HEART RATE: 60 BPM | RESPIRATION RATE: 18 BRPM

## 2018-10-31 PROCEDURE — 99214 OFFICE O/P EST MOD 30 MIN: CPT

## 2018-11-05 ENCOUNTER — APPOINTMENT (OUTPATIENT)
Dept: HEART AND VASCULAR | Facility: CLINIC | Age: 63
End: 2018-11-05

## 2019-02-19 ENCOUNTER — APPOINTMENT (OUTPATIENT)
Dept: PULMONOLOGY | Facility: CLINIC | Age: 64
End: 2019-02-19
Payer: MEDICAID

## 2019-02-19 VITALS
WEIGHT: 204 LBS | BODY MASS INDEX: 29.2 KG/M2 | SYSTOLIC BLOOD PRESSURE: 130 MMHG | TEMPERATURE: 98.4 F | HEART RATE: 81 BPM | OXYGEN SATURATION: 98 % | DIASTOLIC BLOOD PRESSURE: 90 MMHG | HEIGHT: 70 IN | RESPIRATION RATE: 12 BRPM

## 2019-02-19 PROCEDURE — 94060 EVALUATION OF WHEEZING: CPT

## 2019-02-19 PROCEDURE — 94727 GAS DIL/WSHOT DETER LNG VOL: CPT

## 2019-02-19 PROCEDURE — 94729 DIFFUSING CAPACITY: CPT

## 2019-02-19 PROCEDURE — 36415 COLL VENOUS BLD VENIPUNCTURE: CPT

## 2019-02-19 PROCEDURE — 99215 OFFICE O/P EST HI 40 MIN: CPT | Mod: 25

## 2019-02-20 NOTE — DISCUSSION/SUMMARY
[FreeTextEntry1] : Attending's Summary \par 2-19-19\par -No oral thrush, no oropharyngeal lesions.  Nasal mucosa mildly inflamed, no polyps, no discharge \par -Thyroid not palpable.    No cervical adenopathy.     No JVD at 45 degrees, no hepatojugular reflux elicited \par -3/6 systolic murmur, loudest at left sternal border, P2 not loud or split \par -no dullness to percussion, decreased breath sounds on left side \par - No pedal edema\par

## 2019-02-20 NOTE — HISTORY OF PRESENT ILLNESS
[Difficulty Breathing During Exertion] : improved dyspnea on exertion [Feelings Of Weakness On Exertion] : improved exercise intolerance [Chest Pain Or Discomfort] : denies chest pain [Fever] : denies fever [Cough] : coughing [FreeTextEntry1] : Pt is 62 yo M with PMH HTN, MVP (dx 1999), left sided pneumonia with development of empyema s/p left VATS decortication on 1/9/18 with Dr. Gauthier.  Pathology revealed fibrous tissue and blood clot with acute inflammation.\par \par Patient is feeling well, walks every day at same distance, stops frequently, walks slowly.   He is able to take deep breaths.     No fevers; night sweats occurring requiring 3 changes of cloths during night.   Appetite is good.    \par \par 2-19-19: Patient is concerned that he has no energy. He does not c/o SOB specifically, but feels he is tired and fatigued throughout the day. He falls asleep easily at his desk which he never did previously. No weight gain - in fact he's lost 5 lbs. 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

## 2019-02-20 NOTE — PHYSICAL EXAM
[General Appearance - Well Developed] : well developed [Normal Appearance] : normal appearance [Well Groomed] : well groomed [General Appearance - Well Nourished] : well nourished [General Appearance - In No Acute Distress] : no acute distress [Normal Conjunctiva] : the conjunctiva exhibited no abnormalities [III] : III [Neck Appearance] : the appearance of the neck was normal [Neck Circumference: ___] : neck circumference is [unfilled] [Heart Rate And Rhythm] : heart rate and rhythm were normal [Heart Sounds] : normal S1 and S2 [Respiration, Rhythm And Depth] : normal respiratory rhythm and effort [Exaggerated Use Of Accessory Muscles For Inspiration] : no accessory muscle use [Auscultation Breath Sounds / Voice Sounds] : lungs were clear to auscultation bilaterally [Surgical scars] : surgical scars [Abdomen Soft] : soft [Abnormal Walk] : normal gait [Gait - Sufficient For Exercise Testing] : the gait was sufficient for exercise testing [Nail Clubbing] : no clubbing of the fingernails [Cyanosis, Localized] : no localized cyanosis [] : no rash [No Focal Deficits] : no focal deficits [Oriented To Time, Place, And Person] : oriented to person, place, and time [Impaired Insight] : insight and judgment were intact [Affect] : the affect was normal [Memory Recent] : recent memory was not impaired [FreeTextEntry1] : normal  [FreeTextEntry2] :  No pedal edema.

## 2019-02-20 NOTE — ASSESSMENT
[FreeTextEntry1] : 2-19-19 \par It was a pleasure to see Souleymane in follow up of empyema s/p decortication\par 1.  Decortication of the lung s/p empyema\par Follow up imaging has remained stable. He has no clinical evidence of recurrence. Mildly decreased breath sounds at the left base are to be expected after surgery.    Continue to exercise on a daily basis to build up his exercise induration. PFTs are normal. \par \par 2.   Subaortic membrane stenosis \par The patient does have aortic regurgitation with subaortic membrane on the echo.    Dr. Figueroa is following him for this, cardiac MRI completed, to follow up in 2 years. Given symptoms of fatigue and weakness, we will obtain a repeat echocardiogram and instructed patient to establish care with a general cardiologist.\par  \par 3.   CLAIRE\par New symptoms of daytime somnolence concerning for CLAIRE. Again discussed with patient re: the importance of a sleep study and he is obtaining new insurance. Patient verbalized understanding. \par \par 4.    Fatigue\par Patient concerned about his fatigue and sleepiness. Will obtain CBC, CMP, TSH and instructed follow up with PMD. Still awaiting sleep study as above.\par \par Return 3 months

## 2019-02-21 LAB
ALBUMIN SERPL ELPH-MCNC: 4.7 G/DL
ALP BLD-CCNC: 56 U/L
ALT SERPL-CCNC: 16 U/L
ANION GAP SERPL CALC-SCNC: 12 MMOL/L
AST SERPL-CCNC: 17 U/L
BASOPHILS # BLD AUTO: 0.03 K/UL
BASOPHILS NFR BLD AUTO: 0.4 %
BILIRUB SERPL-MCNC: 0.7 MG/DL
BUN SERPL-MCNC: 17 MG/DL
CALCIUM SERPL-MCNC: 9.1 MG/DL
CHLORIDE SERPL-SCNC: 105 MMOL/L
CO2 SERPL-SCNC: 24 MMOL/L
CREAT SERPL-MCNC: 1.06 MG/DL
EOSINOPHIL # BLD AUTO: 0.08 K/UL
EOSINOPHIL NFR BLD AUTO: 1 %
GLUCOSE SERPL-MCNC: 142 MG/DL
HCT VFR BLD CALC: 44.5 %
HGB BLD-MCNC: 14 G/DL
IMM GRANULOCYTES NFR BLD AUTO: 0.2 %
LYMPHOCYTES # BLD AUTO: 2.1 K/UL
LYMPHOCYTES NFR BLD AUTO: 25 %
MAN DIFF?: NORMAL
MCHC RBC-ENTMCNC: 30.1 PG
MCHC RBC-ENTMCNC: 31.5 GM/DL
MCV RBC AUTO: 95.7 FL
MONOCYTES # BLD AUTO: 0.37 K/UL
MONOCYTES NFR BLD AUTO: 4.4 %
NEUTROPHILS # BLD AUTO: 5.79 K/UL
NEUTROPHILS NFR BLD AUTO: 69 %
PLATELET # BLD AUTO: 233 K/UL
POTASSIUM SERPL-SCNC: 4.2 MMOL/L
PROT SERPL-MCNC: 7.1 G/DL
RBC # BLD: 4.65 M/UL
RBC # FLD: 12.6 %
SODIUM SERPL-SCNC: 141 MMOL/L
TSH SERPL-ACNC: 1.07 UIU/ML
WBC # FLD AUTO: 8.39 K/UL

## 2019-03-01 ENCOUNTER — TRANSCRIPTION ENCOUNTER (OUTPATIENT)
Age: 64
End: 2019-03-01

## 2019-05-24 ENCOUNTER — CLINICAL ADVICE (OUTPATIENT)
Age: 64
End: 2019-05-24

## 2019-05-27 ENCOUNTER — FORM ENCOUNTER (OUTPATIENT)
Age: 64
End: 2019-05-27

## 2019-05-28 ENCOUNTER — OUTPATIENT (OUTPATIENT)
Dept: OUTPATIENT SERVICES | Facility: HOSPITAL | Age: 64
LOS: 1 days | End: 2019-05-28
Payer: MEDICAID

## 2019-05-28 ENCOUNTER — APPOINTMENT (OUTPATIENT)
Dept: PULMONOLOGY | Facility: CLINIC | Age: 64
End: 2019-05-28
Payer: MEDICAID

## 2019-05-28 VITALS
OXYGEN SATURATION: 98 % | SYSTOLIC BLOOD PRESSURE: 140 MMHG | DIASTOLIC BLOOD PRESSURE: 80 MMHG | RESPIRATION RATE: 12 BRPM | HEART RATE: 73 BPM | BODY MASS INDEX: 29.35 KG/M2 | WEIGHT: 205 LBS | HEIGHT: 70 IN | TEMPERATURE: 99.2 F

## 2019-05-28 DIAGNOSIS — Z93.8 OTHER ARTIFICIAL OPENING STATUS: Chronic | ICD-10-CM

## 2019-05-28 DIAGNOSIS — Z91.89 OTHER SPECIFIED PERSONAL RISK FACTORS, NOT ELSEWHERE CLASSIFIED: ICD-10-CM

## 2019-05-28 DIAGNOSIS — Z23 ENCOUNTER FOR IMMUNIZATION: ICD-10-CM

## 2019-05-28 PROCEDURE — 71046 X-RAY EXAM CHEST 2 VIEWS: CPT

## 2019-05-28 PROCEDURE — G0009: CPT

## 2019-05-28 PROCEDURE — 99215 OFFICE O/P EST HI 40 MIN: CPT | Mod: 25

## 2019-05-28 PROCEDURE — 94060 EVALUATION OF WHEEZING: CPT

## 2019-05-28 PROCEDURE — 71046 X-RAY EXAM CHEST 2 VIEWS: CPT | Mod: 26

## 2019-05-28 PROCEDURE — 36415 COLL VENOUS BLD VENIPUNCTURE: CPT

## 2019-05-28 PROCEDURE — 90670 PCV13 VACCINE IM: CPT

## 2019-05-28 NOTE — REVIEW OF SYSTEMS
[As Noted in HPI] : as noted in HPI [Dyspnea] : dyspnea [Cough] : cough [Negative] : Endocrine [Fatigue] : fatigue

## 2019-05-30 LAB
BASOPHILS # BLD AUTO: 0.05 K/UL
BASOPHILS NFR BLD AUTO: 0.6 %
EOSINOPHIL # BLD AUTO: 0.27 K/UL
EOSINOPHIL NFR BLD AUTO: 3.4 %
HCT VFR BLD CALC: 44.1 %
HGB BLD-MCNC: 14.3 G/DL
IMM GRANULOCYTES NFR BLD AUTO: 0.1 %
LYMPHOCYTES # BLD AUTO: 2.35 K/UL
LYMPHOCYTES NFR BLD AUTO: 29.9 %
MAN DIFF?: NORMAL
MCHC RBC-ENTMCNC: 31.2 PG
MCHC RBC-ENTMCNC: 32.4 GM/DL
MCV RBC AUTO: 96.3 FL
MONOCYTES # BLD AUTO: 0.39 K/UL
MONOCYTES NFR BLD AUTO: 5 %
NEUTROPHILS # BLD AUTO: 4.78 K/UL
NEUTROPHILS NFR BLD AUTO: 61 %
PLATELET # BLD AUTO: 276 K/UL
PROCALCITONIN SERPL-MCNC: 0.08 NG/ML
RBC # BLD: 4.58 M/UL
RBC # FLD: 12.7 %
WBC # FLD AUTO: 7.85 K/UL

## 2019-05-30 NOTE — ASSESSMENT
[FreeTextEntry1] : 5-28-19\par It was a pleasure to see Souleymane in follow up.   \par \par 1.  Decortication of the lung s/p empyema\par Follow up imaging has remained stable.  Mildly decreased breath sounds at the left base are to be expected after surgery.  Spirometry is normal today. He has had cough roughly 3 weeks, not much associated sputum. Associated waxing and waning low-grade fever that has resolved. Cough today rated 5/10. Because of his history of empyema, we will get a chest x-ray and we will also check CBC and procalcitonin today.  We will also follow-up CT chest findings one year after his last test (next due 09/2019).  \par If this CT does not show any significant change, we plan to discontinue annual CT follow-up. \par \par 2.   CLAIRE and fatigue\par Pt continues with fatigue. Attended sleep study on 5/20/19 with  demonstrated AHI of 1.6 and min SpO2 87%. Report concluded no evidence for CLAIRE. His Hgb and TSH were WNL at his last visit.  We have not identified a cause of his fatigue aside from limited sleep. I would like to refer him to Dr. Mendoza for further evaluation. \par \par 3.   Subaortic membrane stenosis \par The patient does have aortic regurgitation with subaortic membrane on echo.    Dr. Figueroa is following him for this, cardiac MRI completed, to follow up in 2 years. Given symptoms of fatigue and weakness, we will have again requested that he have a repeat echocardiogram and instructed patient to establish care with a general cardiologist.\par \par 4. Health maintenance\par We administered Prevnar (13-valent) in the office today. He will be due for Pneumovax (23-valent) in one year. \par \par RTC: 3 months

## 2019-05-30 NOTE — END OF VISIT
[>50% of Time Spent on Counseling and Coordination of Care for  ___] : Greater than 50% of the encounter time was spent on counseling and coordination of care for [unfilled] [Time Spent: ___ minutes] : I have spent [unfilled] minutes of face to face time with the patient [FreeTextEntry3] : All medical record entries made by the Scribe were at my, Dr. Jaret Brown's direction and personally dictated by me.   I have reviewed the chart and agree that the record accurately reflects my personal performance of the history, physical exam, assessment and plan. I have also personally directed, reviewed, and agreed with the chart.

## 2019-05-30 NOTE — CONSULT LETTER
[Dear  ___] : Dear  [unfilled], [Consult Letter:] : I had the pleasure of evaluating your patient, [unfilled]. [Sincerely,] : Sincerely, [Consult Closing:] : Thank you very much for allowing me to participate in the care of this patient.  If you have any questions, please do not hesitate to contact me. [Please see my note below.] : Please see my note below. [DrChey  ___] : Dr. MOLINA [FreeTextEntry2] : Tess Forte MD [FreeTextEntry3] : Jaret Brown MD

## 2019-05-30 NOTE — PROCEDURE
[FreeTextEntry1] : Spirometry 5/28/19:\par FVC: 3.49 L (81%) --> 3.59 (83%)\par FEV1: 2.79 L/s (81%) --> 2.79 L/s (82%)\par FEV1/FVC: 80% --> 78%\par YAR74-53%: 2.86 L/s (83%) --> 3.34 L/s (88%)\par Normal spirometry.  \par \par PFT 2-19-19\par FVC 3.63 (89%)\par FEV1 2.87 (88%)\par FEV1/FVC 79%\par %\par RV 92%\par DLCO 109%\par \par EXAM: CT CHEST PROCEDURE DATE: 09/12/2018 \par INTERPRETATION: CT Chest without intravenous contrast \par History: History of left lower lobe pneumonia and empyema post decortication. Six-month follow-up. \par Comparison: CT chest 3/6/2018. \par FINDINGS: \par Lungs and large airways: Improvement in linear atelectasis within the lingula. There is curvilinear atelectasis unchanged in extent involving the lateral and posterior basal segment of the left lower lobe slightly improved. Scattered tree-in-bud centrilobular micronodules are noted compatible with mild small airway inflammation. No significant mosaic perfusion to suggest air-trapping. Stable 5 mm. Fissural right middle lobe nodule seen on image 156, series 3. \par Pleura: There is interval resolution of left-sided pleural effusion. No right pleural effusion. \par Lymph nodes: No thoracic lymphadenopathy. \par Mediastinum: Heart size is normal. No pericardial effusion. Mild coronary artery calcification. \par Vessels: Borderline aneurysmal dilatation of the ascending aorta measuring 4.0 cm. \par Chest wall and lower neck: Mild symmetric gynecomastia. \par Upper abdomen: Unremarkable. \par Bones: Partial visualization of marked ankylosis of lower cervical vertebral bodies, unchanged. \par \par IMPRESSION: \par 1. Interval resolution of left pleural effusion. \par 2. Borderline aneurysmal dilatation of the ascending aorta. \par 3. Improving linear atelectasis \par 4. Mild small airway inflammation without air trapping.

## 2019-05-30 NOTE — DISCUSSION/SUMMARY
[FreeTextEntry1] : Attending's Summary \par 5-28-19\par -no pallor or icterus \par -No oral thrush, tongue shows fissuring, nasal mucosa mildly erythematous \par -Thyroid not palpable.    No cervical adenopathy.     No JVD at 45 degrees, no hepatojugular reflux elicited \par -2/6 systolic murmur at left sternal border \par -few inspiratory crackles in the infrascapular area on right side\par - No pedal edema\par \par Spirometry is within normal limits.    There was an insignificant response to inhaled bronchodilator.   Forced vital capacity is normal.   \par

## 2019-05-30 NOTE — PHYSICAL EXAM
[General Appearance - Well Developed] : well developed [Normal Appearance] : normal appearance [Well Groomed] : well groomed [General Appearance - Well Nourished] : well nourished [Normal Conjunctiva] : the conjunctiva exhibited no abnormalities [General Appearance - In No Acute Distress] : no acute distress [Neck Appearance] : the appearance of the neck was normal [Neck Circumference: ___] : neck circumference is [unfilled] [Heart Rate And Rhythm] : heart rate and rhythm were normal [Heart Sounds] : normal S1 and S2 [Exaggerated Use Of Accessory Muscles For Inspiration] : no accessory muscle use [Respiration, Rhythm And Depth] : normal respiratory rhythm and effort [Auscultation Breath Sounds / Voice Sounds] : lungs were clear to auscultation bilaterally [Surgical scars] : surgical scars [Abdomen Soft] : soft [Gait - Sufficient For Exercise Testing] : the gait was sufficient for exercise testing [Abnormal Walk] : normal gait [Nail Clubbing] : no clubbing of the fingernails [Cyanosis, Localized] : no localized cyanosis [] : no rash [No Focal Deficits] : no focal deficits [Oriented To Time, Place, And Person] : oriented to person, place, and time [Impaired Insight] : insight and judgment were intact [Memory Recent] : recent memory was not impaired [Affect] : the affect was normal [II] : II [FreeTextEntry1] : 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

## 2019-05-30 NOTE — HISTORY OF PRESENT ILLNESS
[Difficulty Breathing During Exertion] : improved dyspnea on exertion [Feelings Of Weakness On Exertion] : improved exercise intolerance [Cough] : coughing [Wheezing] : resolved wheezing [Chest Pain Or Discomfort] : denies chest pain [FreeTextEntry1] : Pt is 63 yo M with PMH HTN, MVP (dx 1999), left sided pneumonia with development of empyema s/p left VATS decortication on 1/9/18 with Dr. Gauthier.  Pathology revealed fibrous tissue and blood clot with acute inflammation.\par \par Patient is feeling well, walks every day at same distance, stops frequently, walks slowly.   He is able to take deep breaths.     No fevers; night sweats occurring requiring 3 changes of cloths during night.   Appetite is good.    \par \par 5/28/19:\par Pt recently had fluctuating low-grade temperature with significant nasal congestion with green nasal discharge. Had cough but was not generally productive. His mother exhibited similar sx at similar time, he started with sx 3 days before her. Currently nasal discharge has resolved but cough continues rated 5/10, previously was 8/10. 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 He also had a sleep study recently with Dr. Go Sandoval. AHI 1.6, min SpO2 87%.   He has not had a break from care-taking for the last 3 weeks. \par His walk is limited by fatigue not shortness of breath. He can walk as much as he'd like on a flat surface, can walk 30 blocks without SOB. Denies chest pain.

## 2019-08-14 NOTE — PHYSICAL THERAPY INITIAL EVALUATION ADULT - TRANSFER SKILLS, REHAB EVAL
Subjective      Patient ID: Annette Montes De Oca is a 49 y.o. female.      HPI    The following have been reviewed and updated as appropriate in this visit:  Allergies  Meds  Problems        1. Kb seeing ortho left arm getting PT     2. abd pain  Did resolve -  No clear diverticulitis though sx were suggesitve of this vs uti   Sx have completely resolved     Disc GI followup due for screening colon     3. Disc on CT did show CT atherosclerosis   But her ldl is not elevated     She does have type 1 dm - has nto been on a statin   Lab Results   Component Value Date    CHOL 150 07/22/2019     Lab Results   Component Value Date    HDL 45 07/22/2019     Lab Results   Component Value Date    LDLCALC 94 07/22/2019     Lab Results   Component Value Date    TRIG 55 07/22/2019     No results found for: CHOLHDL     Ct abd pelvis IMPRESSION:  1.  No definite acute abdominal pelvic pathology.  2.  Normal appendix.  3.  No inflammatory or obstructive process appreciated.  4.  Atherosclerotic disease advanced for age.  ldl 94  Lab Results   Component Value Date    WBC 5.6 10/06/2017    HGB 14.3 10/06/2017    HCT 41.9 10/06/2017     (H) 10/06/2017    CHOL 150 07/22/2019    TRIG 55 07/22/2019    HDL 45 07/22/2019    ALT 9 07/22/2019    AST 9 07/22/2019     07/22/2019    K 4.5 07/22/2019     07/22/2019    CREATININE 0.89 07/22/2019    BUN 9 07/22/2019    CO2 20 07/22/2019    TSH 2.830 07/22/2019    HGBA1C 8.3 (H) 07/22/2019    MICROALBUR <3.0 07/22/2019       Review of Systems      Current Outpatient Prescriptions:   •  azelastine 0.15 % (205.5 mcg) nasal spray, INSTILL 1 SPRAY IN EACH NOSTRIL 2 TIMES EVERY DAY, Disp: 30 mL, Rfl: 0  •  buPROPion XL (WELLBUTRIN XL) 150 mg 24 hr tablet, TAKE 1 TABLET BY MOUTH EVERY DAY, Disp: 30 tablet, Rfl: 0  •  insulin regular (NovoLIN R) 100 unit/mL injection, inject by sliding scale with meals (according to carbohydrate scale), Disp: , Rfl:   •  levothyroxine (SYNTHROID) 75  "mcg tablet, 1 TABLET ON AN EMPTY STOMACH IN THE MORNING, Disp: , Rfl: 3  •  montelukast (SINGULAIR) 10 mg tablet, Take 1 tablet (10 mg total) by mouth every evening., Disp: 90 tablet, Rfl: 0  •  norgestrel-ethinyl estradiol (CRYSELLE, 28,) 0.3-30 mg-mcg per tablet, take 1 tablet by oral route  every day, Disp: , Rfl:   •  PARoxetine (PAXIL) 40 mg tablet, TAKE 1 TABLET BY MOUTH EVERY DAY, Disp: 30 tablet, Rfl: 0  •  SUMAtriptan (IMITREX) 25 mg tablet, Take 1 tablet (25 mg total) by mouth once as needed for migraine for up to 1 dose. May repeat dose once in 2 hours if no relief., Disp: 9 tablet, Rfl: 0  •  zolpidem (AMBIEN) 5 mg tablet, Take 1 tablet (5 mg total) by mouth nightly as needed for sleep., Disp: 30 tablet, Rfl: 3  •  atorvastatin (LIPITOR) 10 mg tablet, Take 1 tablet (10 mg total) by mouth daily., Disp: 30 tablet, Rfl: 6    Past Medical History:   Diagnosis Date   • Diabetes mellitus type I (CMS/HCC) (HCC)    • Disease of thyroid gland      Past Surgical History:   Procedure Laterality Date   • CARPAL TUNNEL RELEASE     • FOOT SURGERY     • HERNIA REPAIR     • MOUTH SURGERY     • OOPHORECTOMY     • SINUS SURGERY     • WISDOM TOOTH EXTRACTION       Objective     Vitals:    08/14/19 1309   BP: 130/70   BP Location: Right upper arm   Patient Position: Sitting   Pulse: 90   Resp: 16   Temp: 37.2 °C (98.9 °F)   TempSrc: Oral   SpO2: 98%   Weight: 89.2 kg (196 lb 9.6 oz)   Height: 1.6 m (5' 3\")     Body mass index is 34.83 kg/m².    Physical Exam   Constitutional: She is oriented to person, place, and time. She appears well-developed and well-nourished.   HENT:   Head: Normocephalic and atraumatic.   Neck: Normal range of motion. Neck supple. No thyromegaly present.   Cardiovascular: Normal rate, regular rhythm and normal heart sounds.  Exam reveals no gallop and no friction rub.    No murmur heard.  Pulmonary/Chest: Effort normal and breath sounds normal. No respiratory distress. She has no wheezes. She has no " rales.   Musculoskeletal: She exhibits no edema.   Lymphadenopathy:     She has no cervical adenopathy.   Neurological: She is alert and oriented to person, place, and time.   Psychiatric: She has a normal mood and affect. Her behavior is normal. Judgment and thought content normal.       Assessment/Plan   Problem List Items Addressed This Visit        Endocrine/Metabolic    Hypothyroid    Type 1 diabetes mellitus (CMS/HCC) (HCC) - Primary    Relevant Orders    Lipid panel    Hemoglobin A1c    Comprehensive metabolic panel      Other Visit Diagnoses     Colon cancer screening        Relevant Orders    Direct Access Colonoscopy [facility]    Coronary atherosclerosis due to calcified coronary lesion of native artery        Relevant Medications    atorvastatin (LIPITOR) 10 mg tablet      we disc family hx of CAD  And also DM increases her risk  And atherosclerosis adv for age on CT -  rec staring low dose statin   Disc side effects - call if any myalgias   Recheck labs in 2-3 mos     Disc gettng colonosocpy      independent

## 2019-08-27 ENCOUNTER — APPOINTMENT (OUTPATIENT)
Dept: PULMONOLOGY | Facility: CLINIC | Age: 64
End: 2019-08-27

## 2019-11-18 ENCOUNTER — INPATIENT (INPATIENT)
Facility: HOSPITAL | Age: 64
LOS: 1 days | Discharge: HOME | DRG: 287 | End: 2019-11-20
Attending: INTERNAL MEDICINE | Admitting: INTERNAL MEDICINE
Payer: MEDICARE

## 2019-11-18 VITALS
WEIGHT: 207.9 LBS | HEART RATE: 78 BPM | RESPIRATION RATE: 18 BRPM | TEMPERATURE: 98 F | OXYGEN SATURATION: 98 % | SYSTOLIC BLOOD PRESSURE: 139 MMHG | DIASTOLIC BLOOD PRESSURE: 88 MMHG

## 2019-11-18 DIAGNOSIS — R07.9 CHEST PAIN, UNSPECIFIED: ICD-10-CM

## 2019-11-18 DIAGNOSIS — Z29.9 ENCOUNTER FOR PROPHYLACTIC MEASURES, UNSPECIFIED: ICD-10-CM

## 2019-11-18 DIAGNOSIS — I10 ESSENTIAL (PRIMARY) HYPERTENSION: ICD-10-CM

## 2019-11-18 DIAGNOSIS — Z93.8 OTHER ARTIFICIAL OPENING STATUS: Chronic | ICD-10-CM

## 2019-11-18 LAB
ALBUMIN SERPL ELPH-MCNC: 4.3 G/DL — SIGNIFICANT CHANGE UP (ref 3.3–5)
ALP SERPL-CCNC: 65 U/L — SIGNIFICANT CHANGE UP (ref 40–120)
ALT FLD-CCNC: 14 U/L — SIGNIFICANT CHANGE UP (ref 10–45)
ANION GAP SERPL CALC-SCNC: 9 MMOL/L — SIGNIFICANT CHANGE UP (ref 5–17)
APTT BLD: 31.1 SEC — SIGNIFICANT CHANGE UP (ref 27.5–36.3)
APTT BLD: 31.2 SEC — SIGNIFICANT CHANGE UP (ref 27.5–36.3)
AST SERPL-CCNC: 16 U/L — SIGNIFICANT CHANGE UP (ref 10–40)
BASOPHILS # BLD AUTO: 0.03 K/UL — SIGNIFICANT CHANGE UP (ref 0–0.2)
BASOPHILS NFR BLD AUTO: 0.4 % — SIGNIFICANT CHANGE UP (ref 0–2)
BILIRUB SERPL-MCNC: 0.5 MG/DL — SIGNIFICANT CHANGE UP (ref 0.2–1.2)
BUN SERPL-MCNC: 19 MG/DL — SIGNIFICANT CHANGE UP (ref 7–23)
CALCIUM SERPL-MCNC: 9.1 MG/DL — SIGNIFICANT CHANGE UP (ref 8.4–10.5)
CHLORIDE SERPL-SCNC: 106 MMOL/L — SIGNIFICANT CHANGE UP (ref 96–108)
CK MB CFR SERPL CALC: 2.7 NG/ML — SIGNIFICANT CHANGE UP (ref 0–6.7)
CK MB CFR SERPL CALC: 3.2 NG/ML — SIGNIFICANT CHANGE UP (ref 0–6.7)
CK SERPL-CCNC: 178 U/L — SIGNIFICANT CHANGE UP (ref 30–200)
CO2 SERPL-SCNC: 26 MMOL/L — SIGNIFICANT CHANGE UP (ref 22–31)
CREAT SERPL-MCNC: 1.03 MG/DL — SIGNIFICANT CHANGE UP (ref 0.5–1.3)
EOSINOPHIL # BLD AUTO: 0.15 K/UL — SIGNIFICANT CHANGE UP (ref 0–0.5)
EOSINOPHIL NFR BLD AUTO: 2 % — SIGNIFICANT CHANGE UP (ref 0–6)
GLUCOSE SERPL-MCNC: 85 MG/DL — SIGNIFICANT CHANGE UP (ref 70–99)
HCT VFR BLD CALC: 45.6 % — SIGNIFICANT CHANGE UP (ref 39–50)
HGB BLD-MCNC: 14.6 G/DL — SIGNIFICANT CHANGE UP (ref 13–17)
IMM GRANULOCYTES NFR BLD AUTO: 0.3 % — SIGNIFICANT CHANGE UP (ref 0–1.5)
INR BLD: 1.09 — SIGNIFICANT CHANGE UP (ref 0.88–1.16)
LYMPHOCYTES # BLD AUTO: 1.86 K/UL — SIGNIFICANT CHANGE UP (ref 1–3.3)
LYMPHOCYTES # BLD AUTO: 25.1 % — SIGNIFICANT CHANGE UP (ref 13–44)
MCHC RBC-ENTMCNC: 29.6 PG — SIGNIFICANT CHANGE UP (ref 27–34)
MCHC RBC-ENTMCNC: 32 GM/DL — SIGNIFICANT CHANGE UP (ref 32–36)
MCV RBC AUTO: 92.5 FL — SIGNIFICANT CHANGE UP (ref 80–100)
MONOCYTES # BLD AUTO: 0.53 K/UL — SIGNIFICANT CHANGE UP (ref 0–0.9)
MONOCYTES NFR BLD AUTO: 7.2 % — SIGNIFICANT CHANGE UP (ref 2–14)
NEUTROPHILS # BLD AUTO: 4.81 K/UL — SIGNIFICANT CHANGE UP (ref 1.8–7.4)
NEUTROPHILS NFR BLD AUTO: 65 % — SIGNIFICANT CHANGE UP (ref 43–77)
NRBC # BLD: 0 /100 WBCS — SIGNIFICANT CHANGE UP (ref 0–0)
PLATELET # BLD AUTO: 238 K/UL — SIGNIFICANT CHANGE UP (ref 150–400)
POTASSIUM SERPL-MCNC: 3.8 MMOL/L — SIGNIFICANT CHANGE UP (ref 3.5–5.3)
POTASSIUM SERPL-SCNC: 3.8 MMOL/L — SIGNIFICANT CHANGE UP (ref 3.5–5.3)
PROT SERPL-MCNC: 6.7 G/DL — SIGNIFICANT CHANGE UP (ref 6–8.3)
PROTHROM AB SERPL-ACNC: 12.3 SEC — SIGNIFICANT CHANGE UP (ref 10–12.9)
RBC # BLD: 4.93 M/UL — SIGNIFICANT CHANGE UP (ref 4.2–5.8)
RBC # FLD: 12.3 % — SIGNIFICANT CHANGE UP (ref 10.3–14.5)
SODIUM SERPL-SCNC: 141 MMOL/L — SIGNIFICANT CHANGE UP (ref 135–145)
TROPONIN T SERPL-MCNC: <0.01 NG/ML — SIGNIFICANT CHANGE UP (ref 0–0.01)
TROPONIN T SERPL-MCNC: <0.01 NG/ML — SIGNIFICANT CHANGE UP (ref 0–0.01)
WBC # BLD: 7.4 K/UL — SIGNIFICANT CHANGE UP (ref 3.8–10.5)
WBC # FLD AUTO: 7.4 K/UL — SIGNIFICANT CHANGE UP (ref 3.8–10.5)

## 2019-11-18 PROCEDURE — 75574 CT ANGIO HRT W/3D IMAGE: CPT | Mod: 26

## 2019-11-18 PROCEDURE — 71046 X-RAY EXAM CHEST 2 VIEWS: CPT | Mod: 26

## 2019-11-18 PROCEDURE — 99285 EMERGENCY DEPT VISIT HI MDM: CPT | Mod: 25

## 2019-11-18 PROCEDURE — 99222 1ST HOSP IP/OBS MODERATE 55: CPT

## 2019-11-18 RX ORDER — SODIUM CHLORIDE 9 MG/ML
1000 INJECTION INTRAMUSCULAR; INTRAVENOUS; SUBCUTANEOUS
Refills: 0 | Status: DISCONTINUED | OUTPATIENT
Start: 2019-11-19 | End: 2019-11-19

## 2019-11-18 RX ORDER — SENNA PLUS 8.6 MG/1
2 TABLET ORAL AT BEDTIME
Refills: 0 | Status: DISCONTINUED | OUTPATIENT
Start: 2019-11-18 | End: 2019-11-20

## 2019-11-18 RX ORDER — METOPROLOL TARTRATE 50 MG
25 TABLET ORAL EVERY 12 HOURS
Refills: 0 | Status: DISCONTINUED | OUTPATIENT
Start: 2019-11-18 | End: 2019-11-20

## 2019-11-18 RX ORDER — POTASSIUM CHLORIDE 20 MEQ
40 PACKET (EA) ORAL ONCE
Refills: 0 | Status: COMPLETED | OUTPATIENT
Start: 2019-11-18 | End: 2019-11-18

## 2019-11-18 RX ORDER — HEPARIN SODIUM 5000 [USP'U]/ML
5000 INJECTION INTRAVENOUS; SUBCUTANEOUS EVERY 12 HOURS
Refills: 0 | Status: DISCONTINUED | OUTPATIENT
Start: 2019-11-18 | End: 2019-11-20

## 2019-11-18 RX ORDER — ACETAMINOPHEN 500 MG
975 TABLET ORAL ONCE
Refills: 0 | Status: COMPLETED | OUTPATIENT
Start: 2019-11-18 | End: 2019-11-18

## 2019-11-18 RX ORDER — INFLUENZA VIRUS VACCINE 15; 15; 15; 15 UG/.5ML; UG/.5ML; UG/.5ML; UG/.5ML
0.5 SUSPENSION INTRAMUSCULAR ONCE
Refills: 0 | Status: DISCONTINUED | OUTPATIENT
Start: 2019-11-18 | End: 2019-11-20

## 2019-11-18 RX ADMIN — Medication 40 MILLIEQUIVALENT(S): at 17:46

## 2019-11-18 RX ADMIN — SENNA PLUS 2 TABLET(S): 8.6 TABLET ORAL at 22:00

## 2019-11-18 RX ADMIN — Medication 975 MILLIGRAM(S): at 12:50

## 2019-11-18 RX ADMIN — Medication 975 MILLIGRAM(S): at 11:31

## 2019-11-18 RX ADMIN — Medication 25 MILLIGRAM(S): at 22:00

## 2019-11-18 NOTE — ED ADULT NURSE NOTE - OBJECTIVE STATEMENT
65 y/o male c/o chest pain w/ cough. Pt reports CP on and off x 2 weeks. pt reports he follows w/ pulm at [facility]. Pt reports dizziness, nausea and vomiting w/ CP. pt speaks clear, MAEx4, ambulates steady, unlabored breathing. Abd soft nt nd. Skin dry warm.

## 2019-11-18 NOTE — H&P ADULT - PROBLEM SELECTOR PLAN 3
-HSQ     -Full Code    -Dispo: Cath in AM, precath/consented. No ASA/Plavix, will load on table as needed. -HSQ     -Full Code    -Dispo: Cath in AM with Dr. Michaels, precath/consented. No ASA/Plavix, will load on table as needed. -HSQ     -Full Code  -Dispo: Cath in AM with Dr. Michaels, precath/consented. No ASA/Plavix, will load on table as needed.      -Full Code.      -ECHO in , [facility] with Dr. Michaels in AM, Precath/Consented. ASA/Plavix load to given on table as needed.   Questionable Right Inguinal hernia,  d/w Dr. Michaels, R radial access if possible.

## 2019-11-18 NOTE — H&P ADULT - NSHPLABSRESULTS_GEN_ALL_CORE
14.6   7.40  )-----------( 238      ( 18 Nov 2019 08:43 )             45.6       11-18    141  |  106  |  19  ----------------------------<  85  3.8   |  26  |  1.03    Ca    9.1      18 Nov 2019 08:43    TPro  6.7  /  Alb  4.3  /  TBili  0.5  /  DBili  x   /  AST  16  /  ALT  14  /  AlkPhos  65  11-18      PT/INR - ( 18 Nov 2019 08:43 )   PT: 12.3 sec;   INR: 1.09          PTT - ( 18 Nov 2019 16:51 )  PTT:31.2 sec    CARDIAC MARKERS ( 18 Nov 2019 08:43 )  x     / <0.01 ng/mL / 215 U/L / x     / 3.2 ng/mL

## 2019-11-18 NOTE — H&P ADULT - PROBLEM SELECTOR PLAN 1
-CP free, NAD, endorsed profound sweating in ED, clinically stable.   -EKG non-ischemic, unchanged from prior EKG.    -Trops < 0.01, f/u CE/EKG at 8pm and 5:30AM  -ECHo in AM to evaluate EF and structural  -Scheduled for Cath in AM to R/O in AM.  Precath/consented. NPO @ [facility] (needs to be added to schedule.   -No ASA/Plavix loaded, will give on table as pt with history of MVP (now with holosystolic murmur)  -Restarted on Metoprolol Tartrate 25mg BID, will consider restarting Losartan (home meds) post cath.  -Elevated CKMB 215, start IVF 100cc x 8h at [facility], reasses and hydrate as need  -F/U lipid panel, HgA1c, TSH in AM  -c/w telemetry -CP free, NAD, endorsed profound sweating in ED, clinically stable.   -EKG non-ischemic, unchanged from prior EKG.    -Trops < 0.01, f/u CE/EKG at 8pm and 5:30AM  -ECHo in AM to evaluate EF and structural  -Scheduled for Cath in AM with Dr. Michaels.  Precath/consented. NPO @ [facility] (needs to be added to schedule.   -No ASA/Plavix loaded, will give on table as pt with history of MVP (now with holosystolic murmur)  -Restarted on Metoprolol Tartrate 25mg BID, will consider restarting Losartan (home meds) post cath.  -Elevated CKMB 215, start IVF 100cc x 8h at [facility], reasses and hydrate as need  -F/U lipid panel, HgA1c, TSH in AM  -c/w telemetry -CP free, NAD, endorsed profound sweating in ED, clinically stable.   -EKG non-ischemic, unchanged from prior EKG.    -Trops < 0.01, f/u CE/EKG at 8pm and 5:30AM  -ECHOin AM to evaluate EF and structural  -Scheduled for Cath in AM with Dr. Michaels.  Precath/consented. NPO @ [facility]   -No ASA/Plavix loaded, will give on table if needed.  Pt with history of remote duodenal ulcer (1984) and was told not to take ASA.  -History MVP (now with holosystolic murmur).  Continue to monitor.    -Restarted on Metoprolol Tartrate 25mg BID, will consider restarting Losartan (home meds) post cath.  -Elevated CKMB 215, start IVF 100cc x 8h at [facility], reasses and hydrate as need  -F/U lipid panel, HgA1c, TSH in AM  -c/w telemetry -CP free, NAD, endorsed profound sweating in ED, clinically stable.   -EKG non-ischemic, unchanged from prior EKG.    -Trops < 0.01, f/u CE/EKG at 8pm and 5:30AM  -ECHOin AM to evaluate EF and structural  -Scheduled for Cath in AM with Dr. Michaels.  Precath/consented. NPO @ [facility]   -No ASA/Plavix loaded, will give on table if needed.  Pt with history of remote duodenal ulcer (1984) and was told not to take ASA, is now amendable to take ASA 81mg if needed.    -History MVP (now with holosystolic murmur).  ECHO for further eval.  Continue to monitor.    -Restarted on Metoprolol Tartrate 25mg BID, will consider restarting Losartan (home meds) post cath.  -Elevated CKMB 215, start IVF 100cc x 8h at [facility], reasses and hydrate as need  -F/U lipid panel, HgA1c, TSH in AM  -c/w telemetry

## 2019-11-18 NOTE — H&P ADULT - HISTORY OF PRESENT ILLNESS
Pt follows with [facility]D [facility] [phone] in [place], cardiologist Dr. Goldberg, pulmonologist Dr. Brown, CT sx Dr. Figueroa and Dr. Oliva.    63 y/o male with PMHx HTN, MVP diagnosed 1999, LVOT obstruction, pneumonia w/ L empyema s/p VATs decortication 2018  done by Dr. Gauthier; now follow up with Dr. Brown), p/w to [facility] ED 11/18/19 with  c/o intermittent, left sided chest pain, associated with SOB and dizziness x 3 weeks, feeling like he is going to fall/pass out.  Today pt reports increased frequency/severity of symptoms that woke him up at 4am a/w   L sided chest pain, SOB, Nausea with some sxs improving spontaneously since 4am.   Pt also endorsed occasional dull headache and chills.  Denies fever, vision changes, neck pain/stiffness, cough, palpitations, abd pain, nv, urinary symptoms, calf pain/swelling, travel, trauma.   Pt reports that he has stopped taking his Losartan and his Metoprolol x 6 months due to change in any medications       NST 3/2018 shows with stress, occasional couplets were seen and in recovery, there were frequent PVCs. 0.5 mm  horizontal St depressions were seen 2 minutes into recovery and resolved 4  minutes into recovery.  The ECG is equivocal for ischemia.ECHO:At rest, LV wall motion and EF 65%.   With stress, LV EF 75%. . No evidence of stress induced  wall motion abnormalities. The echocardiogram is negative for ischemia.  (see full report).  KERWIN 3/2018: Normal EF, no interatrial shunt. Mildly thickened trileaflet AV.   Mild to mod AR.   Subaortic noted in the  LVOT resulting in moderate LVOT obstruction.  The peak pressure gradient is 46, mean pressure gradient is 23 mmHg.  Normal V, mild MR, trace TR (see full report).    CXR 11/18:  Left basilar pleural thickening, unchanged. Otherwise, clear lungs. Heart and mediastinum are unremarkable. Stable bony structures.  CCTA 11/18:Calcium score 133, severe stenosis of non-dominant proximal RCA.  Moderate stenosis of mid LAD and LPDA.  Lss than 50% stenosis of LM.  Non-obstructive coronary disease of proximal LAD, distal LAD, proximal LCX and non-dominant mid RCA.  The rest of the coronary segments are normal.  There is a subaortic membrane seen in the LV septum.  Mild aneurysmal dilatation of the aortic root and ascending segment of  the thoracic aorta. (see full report)    In ED, VS: 142/77, HR 60, RR 18, temp 97.5, O2Sat 98%.  EKG: non-ischemic, unchanged from prior EKG. Pt follows with PMD Dalemi [phone] in [place],  63 y/o male with PMHx HTN, MVP diagnosed 1999, LVOT obstruction, pneumonia w/ L empyema s/p VATs decortication 2018  done by Dr. Gauthier;  pt follow with Dr. Brown),  presented to [facility] ED 11/18/19 with  c/o intermittent, left sided chest pain, associated with SOB and dizziness x 3 weeks, feeling like he is going to fall/pass out.  Today pt reports increased frequency/severity of symptoms that woke him up at 4am a/w   L sided chest pain, SOB, episodes of nausea, occasional dull right sided headache and some chills which has since resolved.    Pt also endorsed profound sweating while sitting in the ED, with his bedsheets changed several times by RN.  He also c/o burning sensation at his VATS site (new) and  non-painful, right groin swelling, ranging from being small to the size of a tennis ball.  He currently denies SOB, palpitations, orthopnea, PND, syncope, fever, abdominal pain/discomfort, Vomiting/Diarrhea, bleeding issues, prior MI, CHF or CVA.  Pt reports that he has stopped taking his Losartan and his Metoprolol x 6 months due to change in any medications.   Subsequent CCTA 11/18:Calcium score 133, severe stenosis of non-dominant proximal RCA.  Moderate stenosis of mid LAD and LPDA.  Lss than 50% stenosis of LM.  Non-obstructive coronary disease of proximal LAD, distal LAD, proximal LCX and non-dominant mid RCA.  The rest of the coronary segments are normal.  There is a subaortic membrane seen in the LV septum.  Mild aneurysmal dilatation of the aortic root and ascending segment of  the thoracic aorta. (see full report).  CXR 11/18:  Left basilar pleural thickening, unchanged. Otherwise, clear lungs. Heart and mediastinum are unremarkable. Stable bony structures.  Prior NST 3/2018 shows with stress, occasional couplets were seen and in recovery, there were frequent PVCs. 0.5 mm  horizontal St depressions were seen 2 minutes into recovery and resolved 4  minutes into recovery.  The ECG is equivocal for ischemia. EF 65%.   With stress, LV EF 75%. . No evidence of stress induced  wall motion abnormalities. The echocardiogram is negative for ischemia.  (see full report).  KERWIN 3/2018: Normal EF, no interatrial shunt. Mildly thickened trileaflet AV.   Mild to mod AR.   Subaortic noted in the  LVOT resulting in moderate LVOT obstruction.  The peak pressure gradient is 46, mean pressure gradient is 23 mmHg.  Normal V, mild MR, trace TR (see full report).  In ED, VS: 142/77, HR 60, RR 18, temp 97.5, O2Sat 98%.  EKG: non-ischemic, unchanged from prior EKG. Labs significant for stable H/H: 14.6/45.6, Sodium 141, Potassium 3.8, BUN/Cr: 19/1.03, Glu 85, INR 1.09, Trops <0.01, CK 32, CKMB 215.  Pt was not given any medications in ED as he is currently symptom free.  He is now admitted to [facility] for telemetry monitoring, R/O ACS, ECHO in AM and Cath in AM to evaluate for ischemia.

## 2019-11-18 NOTE — ED PROVIDER NOTE - ATTENDING CONTRIBUTION TO CARE
pt seen and examined by me, key points of case shelley WARD.  63 yo male co int cp for past 3 weeks.  ekg here no st elevation.  vitals normal.  pt appears comfortable.  heart and lungs normal, no edema.  spoke with patients cardiologist, she agrees with ct cardiac.  pt agrees to stay for test, ordered.  troponin normal.  if ct cardiac no severe stenosis, can fu with cardiology outpatient

## 2019-11-18 NOTE — H&P ADULT - ASSESSMENT
63 y/o male with PMHx HTN, MVP diagnosed 1999, LVOT obstruction, pneumonia w/ L empyema s/p VATs decortication 2018  done by Dr. Gauthier;  pt follow with Dr. Brown), presented  to [facility] ED 11/18/19 with  c/o CCS Class IV with subsequent abnormal CCTA showing severe stenosis of non-dominant proximal RCA.  Moderate stenosis of mid LAD and LPDA.  Less than 50% stenosis of LM.  Non-obstructive coronary disease of proximal LAD, distal LAD, proximal LCX and non-dominant mid RCA. NST 3/2018 : Occas couplets, frequent PVCs. 0.5 mm  horizontal St depressions were seen 2 minutes into recovery and resolved 4  minutes into recovery.  The ECG is equivocal for ischemi  EF 65%, with stress, LV EF 75%.   Pt is admitted to 5U for telemetry monitoring, R/O ACS, ECHO in AM and Cath in AM to evaluate for ischemia. 63 y/o male with PMHx HTN, MVP diagnosed 1999, LVOT obstruction, pneumonia w/ L empyema s/p VATs decortication 2018  done by Dr. Gauthier;  pt follow with Dr. Brown), presented  to [facility] ED 11/18/19 with  c/o CCS Class IV with subsequent abnormal CCTA showing severe stenosis of non-dominant proximal RCA.  Moderate stenosis of mid LAD and LPDA.  Less than 50% stenosis of LM.  Non-obstructive coronary disease of proximal LAD, distal LAD, proximal LCX and non-dominant mid RCA. NST 3/2018 : Occas couplets, frequent PVCs. 0.5 mm  horizontal St depressions were seen 2 minutes into recovery and resolved 4  minutes into recovery.  The ECG is equivocal for ischemi  EF 65%, with stress, LV EF 75%.  EKG non-ischemic, trops x 1 set neg, elevated CKMB 215.   Pt is admitted to 5U for telemetry monitoring, R/O ACS, ECHO in AM and Cath in AM to evaluate for ischemia.

## 2019-11-18 NOTE — ED PROVIDER NOTE - OBJECTIVE STATEMENT
64 M pmh htn, MVP, aortic regurgitation, pneumonia w/ L empyema s/p VATs decortication 2018 p/w chest pain, sob and dizziness x 3 weeks.  Pt reports intermittent L sided chest pain w/ associated SOB and dizziness feeling like he is going to fall/pass out.  Reports increased frequency/severity of symptoms, woke up at 4am today with L sided chest pain/sob w/ nausea- sxs improving spontaneously since 4am.  Pt has not been taking any medications x6 mons due to change in insurance.   Also reports occasional dull headache and chills.  Denies fever, vision changes, neck pain/stiffness, cough, palpitations, abd pain, nv, urinary symptoms, calf pain/swelling, travel, trauma.  Pt follows with [facility]D Ronnie [phone] in [place], cardiologist Dr. Goldberg, pulmonologist Dr. Brown, CT sx Dr. Figueroa and Dr. Oliva. 64 M pmh htn, MVP, LVOT obstruction, pneumonia w/ L empyema s/p VATs decortication 2018 p/w chest pain, sob and dizziness x 3 weeks.  Pt reports intermittent L sided chest pain w/ associated SOB and dizziness feeling like he is going to fall/pass out.  Reports increased frequency/severity of symptoms, woke up at 4am today with L sided chest pain/sob w/ nausea- sxs improving spontaneously since 4am.  Pt has not been taking any medications x6 mons due to change in insurance.   Also reports occasional dull headache and chills.  Denies fever, vision changes, neck pain/stiffness, cough, palpitations, abd pain, nv, urinary symptoms, calf pain/swelling, travel, trauma.  Pt follows with [facility]D Ronnie [phone] in [place], cardiologist Dr. Goldberg, pulmonologist Dr. Brown, CT sx Dr. Figueroa and Dr. Oliva.

## 2019-11-18 NOTE — H&P ADULT - NSICDXFAMILYHX_GEN_ALL_CORE_FT
FAMILY HISTORY:  Mother  Still living? Unknown  Family history of atrial fibrillation, Age at diagnosis: Age Unknown  Family history of hypertension, Age at diagnosis: Age Unknown  Family history of Wegener's granulomatosis, Age at diagnosis: Age Unknown    Sibling  Still living? Unknown  Family history of mitral valve prolapse, Age at diagnosis: Age Unknown

## 2019-11-18 NOTE — ED PROVIDER NOTE - PROGRESS NOTE DETAILS
first troponin negative, CXR unchanged from prior.  pt follows with CT surgery, consulted for further recs as far as imaging/follow up CT surgery recommends w/u of chest pain, no specific imaging;  d/w cards Dr. Goldberg who pt used to follow with.  will obtain CT chest to compare to previous Pt received from Dr. Davalos at s/o; pt with chest pain with h/o borderline stress test last yr. Cardiac cta showing severe stenosis to non-dominant prox rca, in additional to other non-obst dz. Will contact cards to discuss case.

## 2019-11-18 NOTE — ED PROVIDER NOTE - CLINICAL SUMMARY MEDICAL DECISION MAKING FREE TEXT BOX
64 M pmh htn, MVP, LVOT obstruction, pneumonia w/ L empyema s/p VATs decortication 2018 p/w chest pain, sob and dizziness x 3 weeks.  labs reviewed; negative troponin.  CT cardiac shows severe stenosis nondominant proximal RCA, other findings of moderate stenosis/nonobstructive coronary disease.  also noted to have stress in 2018 w/ sub cm ST depressions equivocal for ischemia.  d/w cardiology hospitalist Dr. Bañuelos, will admit for cardiac cath tomorrow.

## 2019-11-18 NOTE — H&P ADULT - PROBLEM SELECTOR PLAN 2
-Stable 's/90's.  Has not taken BP meds in 6 months due to lack of insurance.  Restarted Metoprolol Tartrate 25mg BID, will consider restarting Losartan post Cath.   -continue to monitor.

## 2019-11-18 NOTE — ED PROVIDER NOTE - PHYSICAL EXAMINATION
Vitals reviewed  Gen: lying comfortably in stretcher, nad, speaking in full sentences  Skin: wwp, no diaphoresis   HEENT: ncat, eomi, mmm  Neck: supple, no JVD  CV: + diastolic murmur, regular rate, no gallop/rub  Resp: symmetrical expansion, ctab, no w/r/r  Abd: soft/nt  Ext: FROM throughout, distal sensation intact, no peripheral edema/calf ttp   Neuro: alert/oriented, no focal deficits, steady gait

## 2019-11-18 NOTE — ED ADULT NURSE REASSESSMENT NOTE - NS ED NURSE REASSESS COMMENT FT1
pt endorsed to me from previous shift, A&O x4, reports pain is 5/10 at this time, " feeling small sharp pains on and off in the middle of the chest". pt taken to x-ray, awaiting blood work and radiology results.

## 2019-11-18 NOTE — H&P ADULT - ATTENDING COMMENTS
I have personally seen, examined, and participated in the care of this patient. I have reviewed all pertinent clinical information, including history, physical exam, plan and the PA's note and agree with the above    -patient admitted with CP - r/o ACS, EKG without ischemic changes  -r/o cpk-trop x 2  -CCTA with severe nondominant RCA, moderate mLAD. Stress echo 2018 with mild ischemic EKG changes during exercise  -Given the above, plan for LHC with FFR mLAD 11/19  -Cont ASA/toprol  -lipid panel  -BP control

## 2019-11-18 NOTE — ED PROVIDER NOTE - DIAGNOSTIC INTERPRETATION
ER PA: Milla Christiansen  CHEST XRAY INTERPRETATION: lungs clear, elevation of L hemidiaphragm unchanged from prior, no cardiomegaly, bony structures intact

## 2019-11-19 ENCOUNTER — TRANSCRIPTION ENCOUNTER (OUTPATIENT)
Age: 64
End: 2019-11-19

## 2019-11-19 DIAGNOSIS — K40.90 UNILATERAL INGUINAL HERNIA, WITHOUT OBSTRUCTION OR GANGRENE, NOT SPECIFIED AS RECURRENT: ICD-10-CM

## 2019-11-19 LAB
ANION GAP SERPL CALC-SCNC: 10 MMOL/L — SIGNIFICANT CHANGE UP (ref 5–17)
BUN SERPL-MCNC: 23 MG/DL — SIGNIFICANT CHANGE UP (ref 7–23)
CALCIUM SERPL-MCNC: 8.6 MG/DL — SIGNIFICANT CHANGE UP (ref 8.4–10.5)
CHLORIDE SERPL-SCNC: 106 MMOL/L — SIGNIFICANT CHANGE UP (ref 96–108)
CHOLEST SERPL-MCNC: 139 MG/DL — SIGNIFICANT CHANGE UP (ref 10–199)
CK MB CFR SERPL CALC: 2.4 NG/ML — SIGNIFICANT CHANGE UP (ref 0–6.7)
CK SERPL-CCNC: 143 U/L — SIGNIFICANT CHANGE UP (ref 30–200)
CO2 SERPL-SCNC: 23 MMOL/L — SIGNIFICANT CHANGE UP (ref 22–31)
CREAT SERPL-MCNC: 1.04 MG/DL — SIGNIFICANT CHANGE UP (ref 0.5–1.3)
GLUCOSE SERPL-MCNC: 96 MG/DL — SIGNIFICANT CHANGE UP (ref 70–99)
HBA1C BLD-MCNC: 5.2 % — SIGNIFICANT CHANGE UP (ref 4–5.6)
HCT VFR BLD CALC: 44.2 % — SIGNIFICANT CHANGE UP (ref 39–50)
HCV AB S/CO SERPL IA: 0.18 S/CO — SIGNIFICANT CHANGE UP
HCV AB SERPL-IMP: SIGNIFICANT CHANGE UP
HDLC SERPL-MCNC: 33 MG/DL — LOW
HGB BLD-MCNC: 14.1 G/DL — SIGNIFICANT CHANGE UP (ref 13–17)
INR BLD: 1.1 — SIGNIFICANT CHANGE UP (ref 0.88–1.16)
LIPID PNL WITH DIRECT LDL SERPL: 89 MG/DL — SIGNIFICANT CHANGE UP
MAGNESIUM SERPL-MCNC: 1.8 MG/DL — SIGNIFICANT CHANGE UP (ref 1.6–2.6)
MCHC RBC-ENTMCNC: 29.6 PG — SIGNIFICANT CHANGE UP (ref 27–34)
MCHC RBC-ENTMCNC: 31.9 GM/DL — LOW (ref 32–36)
MCV RBC AUTO: 92.9 FL — SIGNIFICANT CHANGE UP (ref 80–100)
NRBC # BLD: 0 /100 WBCS — SIGNIFICANT CHANGE UP (ref 0–0)
PLATELET # BLD AUTO: 192 K/UL — SIGNIFICANT CHANGE UP (ref 150–400)
POTASSIUM SERPL-MCNC: 4 MMOL/L — SIGNIFICANT CHANGE UP (ref 3.5–5.3)
POTASSIUM SERPL-SCNC: 4 MMOL/L — SIGNIFICANT CHANGE UP (ref 3.5–5.3)
PROTHROM AB SERPL-ACNC: 12.5 SEC — SIGNIFICANT CHANGE UP (ref 10–12.9)
RBC # BLD: 4.76 M/UL — SIGNIFICANT CHANGE UP (ref 4.2–5.8)
RBC # FLD: 12.2 % — SIGNIFICANT CHANGE UP (ref 10.3–14.5)
SODIUM SERPL-SCNC: 139 MMOL/L — SIGNIFICANT CHANGE UP (ref 135–145)
TOTAL CHOLESTEROL/HDL RATIO MEASUREMENT: 4.2 RATIO — SIGNIFICANT CHANGE UP (ref 3.4–9.6)
TRIGL SERPL-MCNC: 83 MG/DL — SIGNIFICANT CHANGE UP (ref 10–149)
TROPONIN T SERPL-MCNC: <0.01 NG/ML — SIGNIFICANT CHANGE UP (ref 0–0.01)
TSH SERPL-MCNC: 1.66 UIU/ML — SIGNIFICANT CHANGE UP (ref 0.35–4.94)
WBC # BLD: 6.51 K/UL — SIGNIFICANT CHANGE UP (ref 3.8–10.5)
WBC # FLD AUTO: 6.51 K/UL — SIGNIFICANT CHANGE UP (ref 3.8–10.5)

## 2019-11-19 PROCEDURE — 99232 SBSQ HOSP IP/OBS MODERATE 35: CPT

## 2019-11-19 PROCEDURE — 93458 L HRT ARTERY/VENTRICLE ANGIO: CPT | Mod: 26

## 2019-11-19 RX ORDER — ACETAMINOPHEN 500 MG
650 TABLET ORAL ONCE
Refills: 0 | Status: COMPLETED | OUTPATIENT
Start: 2019-11-19 | End: 2019-11-19

## 2019-11-19 RX ORDER — SODIUM CHLORIDE 9 MG/ML
1000 INJECTION INTRAMUSCULAR; INTRAVENOUS; SUBCUTANEOUS
Refills: 0 | Status: DISCONTINUED | OUTPATIENT
Start: 2019-11-19 | End: 2019-11-19

## 2019-11-19 RX ORDER — SODIUM CHLORIDE 9 MG/ML
500 INJECTION INTRAMUSCULAR; INTRAVENOUS; SUBCUTANEOUS
Refills: 0 | Status: DISCONTINUED | OUTPATIENT
Start: 2019-11-19 | End: 2019-11-20

## 2019-11-19 RX ADMIN — Medication 25 MILLIGRAM(S): at 17:13

## 2019-11-19 RX ADMIN — Medication 650 MILLIGRAM(S): at 21:37

## 2019-11-19 RX ADMIN — SODIUM CHLORIDE 100 MILLILITER(S): 9 INJECTION INTRAMUSCULAR; INTRAVENOUS; SUBCUTANEOUS at 14:10

## 2019-11-19 RX ADMIN — Medication 650 MILLIGRAM(S): at 22:43

## 2019-11-19 RX ADMIN — SODIUM CHLORIDE 75 MILLILITER(S): 9 INJECTION INTRAMUSCULAR; INTRAVENOUS; SUBCUTANEOUS at 17:15

## 2019-11-19 RX ADMIN — HEPARIN SODIUM 5000 UNIT(S): 5000 INJECTION INTRAVENOUS; SUBCUTANEOUS at 06:40

## 2019-11-19 RX ADMIN — SODIUM CHLORIDE 100 MILLILITER(S): 9 INJECTION INTRAMUSCULAR; INTRAVENOUS; SUBCUTANEOUS at 00:36

## 2019-11-19 RX ADMIN — Medication 25 MILLIGRAM(S): at 09:55

## 2019-11-19 NOTE — PROGRESS NOTE ADULT - ATTENDING COMMENTS
I have personally seen, examined, and participated in the care of this patient. I have reviewed all pertinent clinical information, including history, physical exam, plan and the resident's note and agree with the above.    -Patient seen and examined this am, without recurrent CP  -Neg cpk/trop x 2  -s/p CATH - Nonobstructive CAD, nl EF  -ECHO pending  -Cont ASA/statin/troprol  -Restart losartan pending Crt post cath  -DC post ECHO if unremarkable

## 2019-11-19 NOTE — PROGRESS NOTE ADULT - SUBJECTIVE AND OBJECTIVE BOX
Procedure: LHC, Radial band  Indication: UA, CAD  Complication: none    Result:  1) Non-obstructive CAD (30% mLAD, 30% D1)  2) Normal LV function, EF 55%, LVEDP 7    Plan: Medical management.

## 2019-11-19 NOTE — PROGRESS NOTE ADULT - PROBLEM SELECTOR PLAN 4
-HSQ   -Full Code  -Dispo: Cath in AM with Dr. Michaels, precath/consented. No ASA/Plavix, will load on table as needed.

## 2019-11-19 NOTE — DISCHARGE NOTE PROVIDER - NSDCFUADDAPPT_GEN_ALL_CORE_FT
Please follow up with either Dr. Hand or Dr. Bañuelos within next 2 weeks  Please follow up with Dr. Mccloud (general surgeon) regarding your hernia

## 2019-11-19 NOTE — PROGRESS NOTE ADULT - SUBJECTIVE AND OBJECTIVE BOX
INTERVAL HPI/OVERNIGHT EVENTS: As per nurse and patient, no o/n events. ***Patient was unwilling to participate in interview. Patient was stated that I was causing him distress and that he doesn't want to answer any further questions from me. He made it clear that he no longer wants me to be a physician in his care.***   Patient was on Losartan and Metoprolol 50mg BID (Dr. Goldberg), but self discontinued in May due to a lapse in Insurance coverage. Currently does not follow with a Cardiologist.     SUBJECTIVE: Patient was seen and examined at bedside.  Minimal Interview.    VITAL SIGNS:  T(F): 98.6 (11-19-19 @ 11:07)  HR: 75 (11-19-19 @ 09:50)  BP: 144/87 (11-19-19 @ 09:50)  RR: 18 (11-19-19 @ 09:50)  SpO2: 98% (11-19-19 @ 09:50)  Wt(kg): --    PHYSICAL EXAM: ***Unable to conduct physical due to patient refusal****      MEDICATIONS  (STANDING):  heparin  Injectable 5000 Unit(s) SubCutaneous every 12 hours  influenza   Vaccine 0.5 milliLiter(s) IntraMuscular once  metoprolol tartrate 25 milliGRAM(s) Oral every 12 hours  senna 2 Tablet(s) Oral at bedtime  sodium chloride 0.9%. 1000 milliLiter(s) (100 mL/Hr) IV Continuous <Continuous>    MEDICATIONS  (PRN):      Allergies    No Known Drug Allergies  Seafood (Rash; Angioedema)    Intolerances    aspirin (Other)  lactose (Vomiting)      LABS:                        14.1   6.51  )-----------( 192      ( 19 Nov 2019 05:57 )             44.2     11-19    139  |  106  |  23  ----------------------------<  96  4.0   |  23  |  1.04    Ca    8.6      19 Nov 2019 05:57  Mg     1.8     11-19    TPro  6.7  /  Alb  4.3  /  TBili  0.5  /  DBili  x   /  AST  16  /  ALT  14  /  AlkPhos  65  11-18    PT/INR - ( 19 Nov 2019 05:57 )   PT: 12.5 sec;   INR: 1.10          PTT - ( 18 Nov 2019 16:51 )  PTT:31.2 sec      RADIOLOGY & ADDITIONAL TESTS:  Reviewed INTERVAL HPI/OVERNIGHT EVENTS: As per nurse and patient, no o/n events. ***Patient was unwilling to participate in interview. Patient stated that I was causing him distress and that he doesn't want to answer any further questions from me. He made it clear that he no longer wants me to be a physician in his care.***   Patient was on Losartan and Metoprolol 50mg BID (Dr. Goldberg), but self discontinued in May due to a lapse in Insurance coverage. Currently does not follow with a Cardiologist.     SUBJECTIVE: Patient was seen and examined at bedside.  Minimal Interview.    VITAL SIGNS:  T(F): 98.6 (11-19-19 @ 11:07)  HR: 75 (11-19-19 @ 09:50)  BP: 144/87 (11-19-19 @ 09:50)  RR: 18 (11-19-19 @ 09:50)  SpO2: 98% (11-19-19 @ 09:50)  Wt(kg): --    PHYSICAL EXAM: ***Unable to conduct physical due to patient refusal****      MEDICATIONS  (STANDING):  heparin  Injectable 5000 Unit(s) SubCutaneous every 12 hours  influenza   Vaccine 0.5 milliLiter(s) IntraMuscular once  metoprolol tartrate 25 milliGRAM(s) Oral every 12 hours  senna 2 Tablet(s) Oral at bedtime  sodium chloride 0.9%. 1000 milliLiter(s) (100 mL/Hr) IV Continuous <Continuous>    MEDICATIONS  (PRN):      Allergies    No Known Drug Allergies  Seafood (Rash; Angioedema)    Intolerances    aspirin (Other)  lactose (Vomiting)      LABS:                        14.1   6.51  )-----------( 192      ( 19 Nov 2019 05:57 )             44.2     11-19    139  |  106  |  23  ----------------------------<  96  4.0   |  23  |  1.04    Ca    8.6      19 Nov 2019 05:57  Mg     1.8     11-19    TPro  6.7  /  Alb  4.3  /  TBili  0.5  /  DBili  x   /  AST  16  /  ALT  14  /  AlkPhos  65  11-18    PT/INR - ( 19 Nov 2019 05:57 )   PT: 12.5 sec;   INR: 1.10          PTT - ( 18 Nov 2019 16:51 )  PTT:31.2 sec      RADIOLOGY & ADDITIONAL TESTS:  Reviewed

## 2019-11-19 NOTE — PROGRESS NOTE ADULT - PROBLEM SELECTOR PLAN 1
Patient CP free, NAD, he endorsed profound sweating in ED, but is clinically stable.   -EKG non-ischemic, unchanged from prior EKG.    -Trops < 0.01, f/u CE/EKG at 8pm and 5:30AM  -ECHOin AM to evaluate EF and structural  -NPO for Cath with Dr. Michaels.  Precath/consented.   -No ASA/Plavix loaded, will give on table if needed.  Pt with history of remote duodenal ulcer (1984) and was told not to take ASA, is now amendable to take ASA 81mg if needed.    -History MVP (now with holosystolic murmur).  ECHO for further eval.  Continue to monitor.    -Restarted on Metoprolol Tartrate 25mg BID, will consider restarting Losartan (home meds) post cath.  -Elevated CKMB 215, start IVF 100cc x 8h at MN, reassess and hydrate as need  -F/U lipid panel, HgA1c, TSH in AM  -c/w telemetry

## 2019-11-19 NOTE — PROGRESS NOTE ADULT - ASSESSMENT
65 y/o male with PMHx HTN, MVP diagnosed 1999, LVOT obstruction, pneumonia w/ L empyema s/p VATs decortication 2018  done by Dr. Gauthier;  pt follow with Dr. Brown), presented  to [facility] ED 11/18/19 with  c/o CCS Class IV with subsequent abnormal CCTA showing severe stenosis of non-dominant proximal RCA.  Moderate stenosis of mid LAD and LPDA.  Less than 50% stenosis of LM.  Non-obstructive coronary disease of proximal LAD, distal LAD, proximal LCX and non-dominant mid RCA. NST 3/2018 : Occas couplets, frequent PVCs. 0.5 mm  horizontal St depressions were seen 2 minutes into recovery and resolved 4  minutes into recovery.  The ECG is equivocal for ischemi  EF 65%, with stress, LV EF 75%.  EKG non-ischemic, trops x 1 set neg, elevated CKMB 215.   Pt is admitted to 5U for telemetry monitoring, R/O ACS, ECHO in AM and Cath in AM to evaluate for ischemia.  Patient transferred to [facility], plan as above.

## 2019-11-19 NOTE — DISCHARGE NOTE PROVIDER - HOSPITAL COURSE
65 y/o male with PMHx HTN, MVP diagnosed 1999, LVOT obstruction, pneumonia w/ L empyema s/p VATs decortication 2018  done by Dr. Gauthier;  pt follow with Dr. Brown), presented  to [facility] ED 11/18/19 with  c/o CCS Class IV with subsequent abnormal CCTA showing severe stenosis of non-dominant proximal RCA.  Moderate stenosis of mid LAD and LPDA.  Less than 50% stenosis of LM.  Non-obstructive coronary disease of proximal LAD, distal LAD, proximal LCX and non-dominant mid RCA. NST 3/2018 : Occas couplets, frequent PVCs. 0.5 mm  horizontal St depressions were seen 2 minutes into recovery and resolved 4  minutes into recovery.  The ECG is equivocal for ischemi  EF 65%, with stress, LV EF 75%.  EKG non-ischemic, trops x 1 set neg, elevated CKMB 215.      Patient has been off his metoprolol and losartan since May of 2019. 63 y/o male with PMHx HTN, MVP diagnosed 1999, LVOT obstruction, pneumonia w/ L empyema s/p VATs decortication 2018  done by Dr. Gauthier;  pt follow with Dr. Brown), presented  to [facility] ED 11/18/19 with chest pain, sob and dizziness x 3 weeks. c/o CCS Class IV with subsequent abnormal CCTA showing severe stenosis of non-dominant proximal RCA.  Moderate stenosis of mid LAD and LPDA.  Less than 50% stenosis of LM.  Non-obstructive coronary disease of proximal LAD, distal LAD, proximal LCX and non-dominant mid RCA. NST 3/2018 : Occas couplets, frequent PVCs. 0.5 mm  horizontal St depressions were seen 2 minutes into recovery and resolved 4  minutes into recovery.  The ECG is equivocal for ischemi  EF 65%, with stress, LV EF 75%.  EKG non-ischemic, trops x 1 set neg, elevated CKMB 215.      Patient has been off his metoprolol and losartan since May of 2019.        #Chest pain:    - Procedure: went for cath    Results:    1) Non-obstructive CAD (30% mLAD, 30% D1)    2) Normal LV function, EF 55%, LVEDP 7    - Discharged on ASA, Atorvastatin 20mg, toprol 25mg        #HOCM    - repeat ECHO unchanged        #Hernia    - referral to general surgery with Dr. england            New medications: ASA, Atorvastatin, Toprol 25mg, Losartan    To follow up: hernia with general surgery. 63 y/o male with PMHx HTN, MVP diagnosed 1999, LVOT obstruction, pneumonia w/ L empyema s/p VATs decortication 2018  done by Dr. Gauthier;  pt follow with Dr. Brown), presented  to [facility] ED 11/18/19 with chest pain, sob and dizziness x 3 weeks. c/o CCS Class IV with subsequent abnormal CCTA showing severe stenosis of non-dominant proximal RCA.  Moderate stenosis of mid LAD and LPDA.  Less than 50% stenosis of LM.  Non-obstructive coronary disease of proximal LAD, distal LAD, proximal LCX and non-dominant mid RCA. NST 3/2018 : Occas couplets, frequent PVCs. 0.5 mm  horizontal St depressions were seen 2 minutes into recovery and resolved 4  minutes into recovery.  The ECG is equivocal for ischemi  EF 65%, with stress, LV EF 75%.  EKG non-ischemic, trops x 1 set neg, elevated CKMB 215.      Patient has been off his metoprolol and losartan since May of 2019.        #Chest pain:    - Had cardiac cath, right radial - no hematoma or bleed    Results:    1) Non-obstructive CAD (30% mLAD, 30% D1)    2) Normal LV function, EF 55%, LVEDP 7    - ruled out NSTEMI    - Discharged on ASA, Atorvastatin 20mg, toprol 25mg        #HOCM    - repeat ECHO unchanged, EF 65%        #Hernia    - referral to general surgery with Dr. england            New medications: ASA, Atorvastatin, Toprol 25mg, Losartan    To follow up: hernia with general surgery.

## 2019-11-19 NOTE — DISCHARGE NOTE PROVIDER - NSDCCPCAREPLAN_GEN_ALL_CORE_FT
PRINCIPAL DISCHARGE DIAGNOSIS  Diagnosis: Chest pain  Assessment and Plan of Treatment: You have a diagnosis of coronary artery disease and received a stent to your ______________ coronary artery. You have been started on Aspirin 81mg daily and Plavix (Clopidogrel) 75mg daily/Brilinta(Ticagrelor) 90mg Twice Daily. You MUST continue taking the daily Aspirin and Plavix/Brilinta/Effient to ensure your stent does not close. DO NOT STOP THESE MEDICATIONS FOR ANY REASON UNLESS OTHERWISE INDICATED BY YOUR CARDIOLOGIST BECAUSE THIS WILL PUT YOU AT RISK FOR A HEART ATTACK OR SUDDEN SEVERE LEG PAIN DUE TO BLOCKAGE OF BLOOD FLOW TO LEG. You should refrain from strenuous activity and heavy lifting for 1 week. Please make a follow up appointment with your cardiologist within 1-2 weeks of your discharge. All of your prescriptions have been sent electronically to your pharmacy.  You have been given a Stent Card to carry with you in your wallet. Make Photocopies or take a picture of card so you have a backup copy. This card has important information for any possible future Radiology/MRI studies.  You underwent a coronary angiogram and should wait 3 days before returning to ordinary activities. Do not drive for 2 days. Do not lift more than 5 pounds with affected arm for 3 days or more than 10 pounds if groin access for 5 days. The catheter from your groin/wrist was removed and bleeding was stopped with manual pressure or an arterial closure device. After 24hours you may take off the dressing and shower. Wash the site with soap and water. There is no need to put on another bandage. Avoid tub baths, hot tubs or swimming for 5 days to prevent infection.  If you notice Bleeding or hematoma formation (collection of blood under the skin), drainage or redness at the puncture site, acute pain, numbness, decrease in strength, coolness or pale coloration of skin of the leg or hand, please call [phone]. Consult your doctor before returning to vigorous activity. PRINCIPAL DISCHARGE DIAGNOSIS  Diagnosis: Chest pain  Assessment and Plan of Treatment: You presented to hospital with chest pain. You have a diagnosis of non-obstructive coronary artery disease, and did not require stenting. Your angio demonstrate non-obstructing 30% occluded mLAD, 30% D1, normal LV function, EF 55% LVEDP 7). You have been started on Aspirin 81mg daily, Atorvastatin 20mg daily, Toprol 25mg daily, and Losartan 25mg daily.   It is imperative that you take these medications daily and as directed.   Please follow up with Dr. Bañuelos ([address]) or Dr. Conroy ([facility]) to discuss further management. Please make a follow up appointment with your cardiologist within 1-2 weeks of your discharge. All of your prescriptions have been sent electronically to your pharmacy.  You underwent a coronary angiogram and should wait 3 days before returning to ordinary activities. You should refrain from strenuous activity and heavy lifting for 1 week. Do not drive for 2 days. Do not lift more than 5 pounds with affected arm for 3 days or more than 10 pounds if groin access for 5 days. The catheter from your groin/wrist was removed and bleeding was stopped with manual pressure or an arterial closure device.   If you notice Bleeding or hematoma formation (collection of blood under the skin), drainage or redness at the puncture site, acute pain, numbness, decrease in strength, coolness or pale coloration of skin of the leg or hand, please call [phone]. Consult your doctor before returning to vigorous activity.      SECONDARY DISCHARGE DIAGNOSES  Diagnosis: Right inguinal hernia  Assessment and Plan of Treatment: You have a chronic inguinal hernia. Please follow up with one of our general surgery specialists or with the general surgeon of your [facility] to further assess your hernia.  Contact Dr. Mariano Mccloud [phone]   [address] [place] [address]

## 2019-11-19 NOTE — DISCHARGE NOTE PROVIDER - PROVIDER TOKENS
PROVIDER:[TOKEN:[[id number]]],FREE:[LAST:[Yazan],FIRST:[Ramy],PHONE:[(   )    -],FAX:[(   )    -],ADDRESS:[Ramy Hand  [address] [place], [place] [address] [phone]]],PROVIDER:[TOKEN:[[id number]]]

## 2019-11-19 NOTE — PROGRESS NOTE ADULT - PROBLEM SELECTOR PLAN 3
Right Inguinal hernia, d/w Dr. Michaels, R radial access if possible.  - Patient without symptoms, denies BPR.

## 2019-11-19 NOTE — DISCHARGE NOTE PROVIDER - NSDCMRMEDTOKEN_GEN_ALL_CORE_FT
acetaminophen 325 mg oral tablet: 2 tab(s) orally every 6 hours, As needed, Mild Pain (1 - 3)  Augmentin 875 mg-125 mg oral tablet: 1 tab(s) orally every 12 hours   docusate sodium 100 mg oral capsule: 1 cap(s) orally 3 times a day  metoprolol tartrate 25 mg oral tablet: 1 tab(s) orally every 12 hours  senna oral tablet: 2 tab(s) orally once a day (at bedtime) acetaminophen 325 mg oral tablet: 2 tab(s) orally every 6 hours, As needed, Mild Pain (1 - 3)  aspirin 81 mg oral tablet: 1 tab(s) orally once a day   atorvastatin 20 mg oral tablet: 1 tab(s) orally once a day   docusate sodium 100 mg oral capsule: 1 cap(s) orally 3 times a day  losartan 25 mg oral tablet: 1 tab(s) orally once a day  senna oral tablet: 2 tab(s) orally once a day (at bedtime)  Toprol-XL 50 mg oral tablet, extended release: 1 tab(s) orally once a day

## 2019-11-19 NOTE — DISCHARGE NOTE PROVIDER - CARE PROVIDER_API CALL
Jeanie Bañuelos)  Cardiovascular Disease; Internal Medicine  [address] [address]  Phone: [phone]  Fax: [phone]  Follow Up Time:     Ramy Hand  [address] [address]  [phone]  Phone: (   )    -  Fax: (   )    -  Follow Up Time:     Mariano Mccloud)  Surgery  [address], [place] [address]  Phone: [phone]  Fax: [phone]  Follow Up Time:

## 2019-11-19 NOTE — DISCHARGE NOTE PROVIDER - CARE PROVIDERS DIRECT ADDRESSES
[email or web address]

## 2019-11-20 ENCOUNTER — TRANSCRIPTION ENCOUNTER (OUTPATIENT)
Age: 64
End: 2019-11-20

## 2019-11-20 VITALS — TEMPERATURE: 98 F

## 2019-11-20 LAB
ANION GAP SERPL CALC-SCNC: 10 MMOL/L — SIGNIFICANT CHANGE UP (ref 5–17)
BUN SERPL-MCNC: 17 MG/DL — SIGNIFICANT CHANGE UP (ref 7–23)
CALCIUM SERPL-MCNC: 8.6 MG/DL — SIGNIFICANT CHANGE UP (ref 8.4–10.5)
CHLORIDE SERPL-SCNC: 105 MMOL/L — SIGNIFICANT CHANGE UP (ref 96–108)
CO2 SERPL-SCNC: 23 MMOL/L — SIGNIFICANT CHANGE UP (ref 22–31)
CREAT SERPL-MCNC: 0.95 MG/DL — SIGNIFICANT CHANGE UP (ref 0.5–1.3)
GLUCOSE SERPL-MCNC: 88 MG/DL — SIGNIFICANT CHANGE UP (ref 70–99)
HCT VFR BLD CALC: 44.8 % — SIGNIFICANT CHANGE UP (ref 39–50)
HGB BLD-MCNC: 14.5 G/DL — SIGNIFICANT CHANGE UP (ref 13–17)
MAGNESIUM SERPL-MCNC: 1.8 MG/DL — SIGNIFICANT CHANGE UP (ref 1.6–2.6)
MCHC RBC-ENTMCNC: 30 PG — SIGNIFICANT CHANGE UP (ref 27–34)
MCHC RBC-ENTMCNC: 32.4 GM/DL — SIGNIFICANT CHANGE UP (ref 32–36)
MCV RBC AUTO: 92.8 FL — SIGNIFICANT CHANGE UP (ref 80–100)
NRBC # BLD: 0 /100 WBCS — SIGNIFICANT CHANGE UP (ref 0–0)
PLATELET # BLD AUTO: 215 K/UL — SIGNIFICANT CHANGE UP (ref 150–400)
POTASSIUM SERPL-MCNC: 4.2 MMOL/L — SIGNIFICANT CHANGE UP (ref 3.5–5.3)
POTASSIUM SERPL-SCNC: 4.2 MMOL/L — SIGNIFICANT CHANGE UP (ref 3.5–5.3)
RBC # BLD: 4.83 M/UL — SIGNIFICANT CHANGE UP (ref 4.2–5.8)
RBC # FLD: 12.2 % — SIGNIFICANT CHANGE UP (ref 10.3–14.5)
SODIUM SERPL-SCNC: 138 MMOL/L — SIGNIFICANT CHANGE UP (ref 135–145)
WBC # BLD: 6.33 K/UL — SIGNIFICANT CHANGE UP (ref 3.8–10.5)
WBC # FLD AUTO: 6.33 K/UL — SIGNIFICANT CHANGE UP (ref 3.8–10.5)

## 2019-11-20 PROCEDURE — 93306 TTE W/DOPPLER COMPLETE: CPT | Mod: 26

## 2019-11-20 PROCEDURE — 71046 X-RAY EXAM CHEST 2 VIEWS: CPT

## 2019-11-20 PROCEDURE — 86803 HEPATITIS C AB TEST: CPT

## 2019-11-20 PROCEDURE — 80061 LIPID PANEL: CPT

## 2019-11-20 PROCEDURE — 84484 ASSAY OF TROPONIN QUANT: CPT

## 2019-11-20 PROCEDURE — 85027 COMPLETE CBC AUTOMATED: CPT

## 2019-11-20 PROCEDURE — C1894: CPT

## 2019-11-20 PROCEDURE — 82553 CREATINE MB FRACTION: CPT

## 2019-11-20 PROCEDURE — C1769: CPT

## 2019-11-20 PROCEDURE — C1887: CPT

## 2019-11-20 PROCEDURE — 84443 ASSAY THYROID STIM HORMONE: CPT

## 2019-11-20 PROCEDURE — 82962 GLUCOSE BLOOD TEST: CPT

## 2019-11-20 PROCEDURE — 75574 CT ANGIO HRT W/3D IMAGE: CPT

## 2019-11-20 PROCEDURE — 85610 PROTHROMBIN TIME: CPT

## 2019-11-20 PROCEDURE — 99238 HOSP IP/OBS DSCHRG MGMT 30/<: CPT

## 2019-11-20 PROCEDURE — 85730 THROMBOPLASTIN TIME PARTIAL: CPT

## 2019-11-20 PROCEDURE — 99285 EMERGENCY DEPT VISIT HI MDM: CPT

## 2019-11-20 PROCEDURE — 82550 ASSAY OF CK (CPK): CPT

## 2019-11-20 PROCEDURE — 85025 COMPLETE CBC W/AUTO DIFF WBC: CPT

## 2019-11-20 PROCEDURE — 83036 HEMOGLOBIN GLYCOSYLATED A1C: CPT

## 2019-11-20 PROCEDURE — 80048 BASIC METABOLIC PNL TOTAL CA: CPT

## 2019-11-20 PROCEDURE — 83735 ASSAY OF MAGNESIUM: CPT

## 2019-11-20 PROCEDURE — 93306 TTE W/DOPPLER COMPLETE: CPT

## 2019-11-20 PROCEDURE — 80053 COMPREHEN METABOLIC PANEL: CPT

## 2019-11-20 PROCEDURE — 36415 COLL VENOUS BLD VENIPUNCTURE: CPT

## 2019-11-20 RX ORDER — LOSARTAN POTASSIUM 100 MG/1
1 TABLET, FILM COATED ORAL
Qty: 30 | Refills: 3
Start: 2019-11-20 | End: 2020-03-18

## 2019-11-20 RX ORDER — METOPROLOL TARTRATE 50 MG
1 TABLET ORAL
Qty: 30 | Refills: 3
Start: 2019-11-20 | End: 2020-03-18

## 2019-11-20 RX ORDER — ATORVASTATIN CALCIUM 80 MG/1
1 TABLET, FILM COATED ORAL
Qty: 30 | Refills: 3
Start: 2019-11-20 | End: 2020-03-18

## 2019-11-20 RX ORDER — ASPIRIN/CALCIUM CARB/MAGNESIUM 324 MG
1 TABLET ORAL
Qty: 30 | Refills: 3
Start: 2019-11-20 | End: 2020-03-18

## 2019-11-20 RX ORDER — LOSARTAN POTASSIUM 100 MG/1
1 TABLET, FILM COATED ORAL
Qty: 0 | Refills: 0 | DISCHARGE

## 2019-11-20 RX ADMIN — HEPARIN SODIUM 5000 UNIT(S): 5000 INJECTION INTRAVENOUS; SUBCUTANEOUS at 06:18

## 2019-11-20 RX ADMIN — Medication 25 MILLIGRAM(S): at 06:18

## 2019-11-20 NOTE — DISCHARGE NOTE NURSING/CASE MANAGEMENT/SOCIAL WORK - PATIENT PORTAL LINK FT
You can access the FollowMyHealth Patient Portal offered by [facility] by registering at the following website: [email or web address]. By joining [facility]’s FollowMyHealth portal, you will also be able to view your health information using other applications (apps) compatible with our system.

## 2019-11-25 DIAGNOSIS — I10 ESSENTIAL (PRIMARY) HYPERTENSION: ICD-10-CM

## 2019-11-25 DIAGNOSIS — K40.90 UNILATERAL INGUINAL HERNIA, WITHOUT OBSTRUCTION OR GANGRENE, NOT SPECIFIED AS RECURRENT: ICD-10-CM

## 2019-11-25 DIAGNOSIS — I25.119 ATHEROSCLEROTIC HEART DISEASE OF NATIVE CORONARY ARTERY WITH UNSPECIFIED ANGINA PECTORIS: ICD-10-CM

## 2019-11-25 DIAGNOSIS — I42.1 OBSTRUCTIVE HYPERTROPHIC CARDIOMYOPATHY: ICD-10-CM

## 2019-11-25 DIAGNOSIS — I34.1 NONRHEUMATIC MITRAL (VALVE) PROLAPSE: ICD-10-CM

## 2019-11-27 ENCOUNTER — APPOINTMENT (OUTPATIENT)
Dept: HEART AND VASCULAR | Facility: CLINIC | Age: 64
End: 2019-11-27

## 2019-11-27 ENCOUNTER — APPOINTMENT (OUTPATIENT)
Dept: HEART AND VASCULAR | Facility: CLINIC | Age: 64
End: 2019-11-27
Payer: MEDICARE

## 2019-11-27 VITALS
OXYGEN SATURATION: 97 % | HEART RATE: 77 BPM | DIASTOLIC BLOOD PRESSURE: 72 MMHG | HEIGHT: 70 IN | SYSTOLIC BLOOD PRESSURE: 140 MMHG | BODY MASS INDEX: 29.35 KG/M2 | WEIGHT: 205 LBS

## 2019-11-27 PROCEDURE — 99214 OFFICE O/P EST MOD 30 MIN: CPT

## 2019-11-27 PROCEDURE — 93350 STRESS TTE ONLY: CPT

## 2019-11-27 NOTE — PHYSICAL EXAM
[General Appearance - Well Developed] : well developed [Normal Appearance] : normal appearance [Well Groomed] : well groomed [General Appearance - Well Nourished] : well nourished [No Deformities] : no deformities [General Appearance - In No Acute Distress] : no acute distress [Normal Conjunctiva] : the conjunctiva exhibited no abnormalities [Eyelids - No Xanthelasma] : the eyelids demonstrated no xanthelasmas [Normal Oral Mucosa] : normal oral mucosa [No Oral Pallor] : no oral pallor [No Oral Cyanosis] : no oral cyanosis [Normal Jugular Venous A Waves Present] : normal jugular venous A waves present [Normal Jugular Venous V Waves Present] : normal jugular venous V waves present [No Jugular Venous Riley A Waves] : no jugular venous riley A waves [Respiration, Rhythm And Depth] : normal respiratory rhythm and effort [Exaggerated Use Of Accessory Muscles For Inspiration] : no accessory muscle use [Auscultation Breath Sounds / Voice Sounds] : lungs were clear to auscultation bilaterally [Heart Rate And Rhythm] : heart rate and rhythm were normal [Heart Sounds] : normal S1 and S2 [Abdomen Soft] : soft [Abdomen Tenderness] : non-tender [Abdomen Mass (___ Cm)] : no abdominal mass palpated [Abnormal Walk] : normal gait [Gait - Sufficient For Exercise Testing] : the gait was sufficient for exercise testing [Nail Clubbing] : no clubbing of the fingernails [Cyanosis, Localized] : no localized cyanosis [Petechial Hemorrhages (___cm)] : no petechial hemorrhages [] : no ischemic changes [Oriented To Time, Place, And Person] : oriented to person, place, and time [Affect] : the affect was normal [Mood] : the mood was normal [No Anxiety] : not feeling anxious

## 2019-12-09 ENCOUNTER — APPOINTMENT (OUTPATIENT)
Dept: SURGERY | Facility: CLINIC | Age: 64
End: 2019-12-09
Payer: MEDICARE

## 2019-12-09 VITALS
TEMPERATURE: 98.4 F | OXYGEN SATURATION: 96 % | SYSTOLIC BLOOD PRESSURE: 138 MMHG | HEIGHT: 70 IN | HEART RATE: 78 BPM | DIASTOLIC BLOOD PRESSURE: 78 MMHG | WEIGHT: 209.38 LBS | BODY MASS INDEX: 29.97 KG/M2

## 2019-12-09 DIAGNOSIS — Z82.49 FAMILY HISTORY OF ISCHEMIC HEART DISEASE AND OTHER DISEASES OF THE CIRCULATORY SYSTEM: ICD-10-CM

## 2019-12-09 DIAGNOSIS — Z87.09 PERSONAL HISTORY OF OTHER DISEASES OF THE RESPIRATORY SYSTEM: ICD-10-CM

## 2019-12-09 DIAGNOSIS — Z86.19 PERSONAL HISTORY OF OTHER INFECTIOUS AND PARASITIC DISEASES: ICD-10-CM

## 2019-12-09 DIAGNOSIS — Z84.1 FAMILY HISTORY OF DISORDERS OF KIDNEY AND URETER: ICD-10-CM

## 2019-12-09 DIAGNOSIS — Z82.61 FAMILY HISTORY OF ARTHRITIS: ICD-10-CM

## 2019-12-09 PROCEDURE — 99203 OFFICE O/P NEW LOW 30 MIN: CPT

## 2019-12-11 ENCOUNTER — MOBILE ON CALL (OUTPATIENT)
Age: 64
End: 2019-12-11

## 2019-12-11 ENCOUNTER — MED ADMIN CHARGE (OUTPATIENT)
Age: 64
End: 2019-12-11

## 2019-12-12 ENCOUNTER — MED ADMIN CHARGE (OUTPATIENT)
Age: 64
End: 2019-12-12

## 2019-12-12 PROBLEM — Z82.49 FAMILY HISTORY OF HYPERTENSION: Status: ACTIVE | Noted: 2019-12-09

## 2019-12-12 PROBLEM — Z86.19 HISTORY OF MEASLES: Status: RESOLVED | Noted: 2019-12-09 | Resolved: 2019-12-12

## 2019-12-12 PROBLEM — Z82.61 FAMILY HISTORY OF ARTHRITIS: Status: ACTIVE | Noted: 2019-12-09

## 2019-12-12 PROBLEM — Z87.09 HISTORY OF BRONCHITIS: Status: RESOLVED | Noted: 2019-12-09 | Resolved: 2019-12-12

## 2019-12-12 PROBLEM — Z84.1 FAMILY HISTORY OF KIDNEY DISEASE: Status: ACTIVE | Noted: 2019-12-09

## 2019-12-12 NOTE — PHYSICAL EXAM
[JVD] : no jugular venous distention  [Normal Breath Sounds] : Normal breath sounds [Normal Heart Sounds] : normal heart sounds [Abdominal Masses] : No abdominal masses [Abdomen Tenderness] : ~T ~M No abdominal tenderness [Tender] : was nontender [Enlarged] : not enlarged [Oriented to Person] : oriented to person [Alert] : alert [Oriented to Place] : oriented to place [Oriented to Time] : oriented to time [de-identified] : VERO. Mona. Appropriate. Comfortable. [Calm] : calm [de-identified] : Supple. Trachea midline. No overt lymphadenopathy. No JVD [de-identified] : Pupils equal. No Scleral Icterus. NCAT. [de-identified] : Abdomen is soft, non tender and non distended. Do not appreciate any overt mass. There is a right inguinal hernia which is reducible. No overt hernia on the left. Potential weakness in this area. \par  [de-identified] : Stigmata scars of decortication.

## 2019-12-12 NOTE — HISTORY OF PRESENT ILLNESS
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 He comes to the office today with a right inguinal hernia that he believes may have been exacerbated by the heavy lifting he undertakes moving her and caring for her. He denies any obstructive type symptoms. The hernia is symptomatic in that is causes some pain and discomfort from time to time, but currently no pain. He has never had a colonoscopy. Of note he suffered from an empyema that required decortication with thoracic surgery in 2018. He reports his breathing is essentially normal at this time.

## 2019-12-12 NOTE — ASSESSMENT
[FreeTextEntry1] : This is a 64 year old male with a right inguinal hernia that is quite symptomatic. He has never had a colonoscopy. He wishes to have his hernia repaired. I have recommended an MIS approach to evaluate both sides. We discussed the risks, benefits and alternatives to robotic assisted laparoscopic repair of right inguinal hernia with mesh, possible bilateral including but not limited to death, disability, need for additional procedures, chronic pain, numbness, scarring, loss of the testicle, infertility, blood clots, bleeding, infection, need for additional procedures and other issues. I answered all questions. The patient does wish to proceed with surgery. He should have a screening colonoscopy and was referred for such. He expressed understanding. Notably greater than 50% of todays 30 minute initial visit was spent on counseling and coordination of his care. \par

## 2019-12-13 ENCOUNTER — APPOINTMENT (OUTPATIENT)
Dept: GASTROENTEROLOGY | Facility: CLINIC | Age: 64
End: 2019-12-13
Payer: MEDICARE

## 2019-12-13 PROCEDURE — 45378 DIAGNOSTIC COLONOSCOPY: CPT

## 2019-12-13 NOTE — HISTORY OF PRESENT ILLNESS
[FreeTextEntry1] : recent hospitalization for chest pains and dizziness\par noted subaortic membrane unchanged\par non obstructive CAD\par c/o sob,

## 2019-12-13 NOTE — REASON FOR VISIT
[Initial Evaluation] : an initial evaluation of [Dyspnea] : dyspnea [FreeTextEntry1] : subaortic membrane

## 2019-12-13 NOTE — DISCUSSION/SUMMARY
[FreeTextEntry1] : sob/ subaortic membrane- normal stress echo with stable gradient\par \par ADDENDUM: 12/13/2019\par labs and chest xray reviewed, medically optimized for planned procedure.

## 2019-12-16 ENCOUNTER — APPOINTMENT (OUTPATIENT)
Dept: HEART AND VASCULAR | Facility: CLINIC | Age: 64
End: 2019-12-16

## 2019-12-17 VITALS
DIASTOLIC BLOOD PRESSURE: 85 MMHG | SYSTOLIC BLOOD PRESSURE: 130 MMHG | OXYGEN SATURATION: 97 % | TEMPERATURE: 98 F | HEART RATE: 71 BPM | WEIGHT: 208.56 LBS | HEIGHT: 70 IN | RESPIRATION RATE: 16 BRPM

## 2019-12-18 ENCOUNTER — OUTPATIENT (OUTPATIENT)
Dept: OUTPATIENT SERVICES | Facility: HOSPITAL | Age: 64
LOS: 1 days | Discharge: HOME | End: 2019-12-18
Payer: MEDICARE

## 2019-12-18 VITALS — SYSTOLIC BLOOD PRESSURE: 130 MMHG | RESPIRATION RATE: 14 BRPM | HEART RATE: 64 BPM | DIASTOLIC BLOOD PRESSURE: 63 MMHG

## 2019-12-18 DIAGNOSIS — Z93.8 OTHER ARTIFICIAL OPENING STATUS: Chronic | ICD-10-CM

## 2019-12-18 PROCEDURE — 49650 LAP ING HERNIA REPAIR INIT: CPT

## 2019-12-18 RX ORDER — OXYCODONE HYDROCHLORIDE 5 MG/1
1 TABLET ORAL
Qty: 12 | Refills: 0
Start: 2019-12-18 | End: 2019-12-20

## 2019-12-18 RX ORDER — DOCUSATE SODIUM 100 MG
1 CAPSULE ORAL
Qty: 10 | Refills: 0
Start: 2019-12-18 | End: 2019-12-27

## 2019-12-18 RX ORDER — BUPIVACAINE 13.3 MG/ML
20 INJECTION, SUSPENSION, LIPOSOMAL INFILTRATION ONCE
Refills: 0 | Status: DISCONTINUED | OUTPATIENT
Start: 2019-12-18 | End: 2019-12-19

## 2019-12-18 RX ORDER — ONDANSETRON 8 MG/1
4 TABLET, FILM COATED ORAL ONCE
Refills: 0 | Status: DISCONTINUED | OUTPATIENT
Start: 2019-12-18 | End: 2019-12-19

## 2019-12-18 RX ORDER — OXYCODONE HYDROCHLORIDE 5 MG/1
10 TABLET ORAL EVERY 6 HOURS
Refills: 0 | Status: DISCONTINUED | OUTPATIENT
Start: 2019-12-18 | End: 2019-12-19

## 2019-12-18 RX ORDER — HYDROMORPHONE HYDROCHLORIDE 2 MG/ML
0.5 INJECTION INTRAMUSCULAR; INTRAVENOUS; SUBCUTANEOUS ONCE
Refills: 0 | Status: DISCONTINUED | OUTPATIENT
Start: 2019-12-18 | End: 2019-12-19

## 2019-12-18 RX ORDER — DOCUSATE SODIUM 100 MG
1 CAPSULE ORAL
Qty: 30 | Refills: 0
Start: 2019-12-18

## 2019-12-18 RX ORDER — OXYCODONE HYDROCHLORIDE 5 MG/1
5 TABLET ORAL EVERY 6 HOURS
Refills: 0 | Status: DISCONTINUED | OUTPATIENT
Start: 2019-12-18 | End: 2019-12-19

## 2019-12-18 NOTE — BRIEF OPERATIVE NOTE - NSICDXBRIEFPROCEDURE_GEN_ALL_CORE_FT
PROCEDURES:  Repair, hernia, inguinal, using laparoscopic visualization of hernia site 18-Dec-2019 13:46:34  Dale Velázquez

## 2019-12-18 NOTE — BRIEF OPERATIVE NOTE - OPERATION/FINDINGS
Zay cutdown supra-umbilical. Ports placed under direct visualization. Robot docked. Progrip mesh and PDO suture placed in abdomen. Peritoneal flap created with monopolar scissor, hernia sac reduced. Mesh placed with ensuring of no flap or lip exposed. PDO suture used to close peritoneal flap.

## 2019-12-19 PROCEDURE — 49650 LAP ING HERNIA REPAIR INIT: CPT | Mod: RT

## 2019-12-19 PROCEDURE — C1781: CPT

## 2019-12-19 PROCEDURE — S2900: CPT

## 2019-12-20 PROBLEM — K40.90 UNILATERAL INGUINAL HERNIA, WITHOUT OBSTRUCTION OR GANGRENE, NOT SPECIFIED AS RECURRENT: Chronic | Status: ACTIVE | Noted: 2019-12-17

## 2019-12-31 NOTE — PROCEDURE
PULMONARY NOTE    Germania Roman MD  Accompanied by both parents.    Chief Complaint   Patient presents with   • Asthma       Patient ID:  is a 2 year old male with a history of bronchitis, croup, eczema, AR and recurrent ear infections who presents for evaluation and management of moderate persistent asthma. He was diagnosed with asthma about 2 months ago due to a history of recurrent bronchitis. He was started initially on albuterol about 2 months ago. He was started on Pulmicort and Flovent about 1 month ago. He uses Flovent 44 mcg daily and Pulmicort when ill. He continues to have a wet cough. The medications have not helped as much. he has never been hospitalized or visited the ER. In his lifetime, he has required steroids and antibiotics more than five times. Triggers include weather, illness, and activity. In terms of his baseline, there is daily coughing or wheezing. His cough is worse at night. he has SOB, coughing, and wheezing with activity. he does have any itchy, watery eyes or rhinorrhea. he does snore, but does not gasp. he does not have any choking, gagging or vomiting.    TRACK SCORE -15    REVIEW OF SYSTEMS: Systems reviewed and negative unless stated above.    PAST MEDICAL HISTORY:   Past Medical History:   Diagnosis Date   • 39 weeks gestation of pregnancy    • Bronchitis    • Croup    • History of frequent ear infections        SURGICAL HISTORY:   No past surgical history on file.    FAMILY HISTORY:   family history is not on file.    SOCIAL HISTORY:   Lives at home with Mother, Father, Grandparent and one cousin  Smoking: No  Pets: Yes and one dog  Environmental Exposures: Mold, Fireplace, Attached garage, Humidifiers, Carpet and Curtains  Attends:  and School    ALLERGIES: ALLERGIES: no known allergies.     IMMUNIZATIONS:   There is no immunization history on file for this patient.   Flu Vaccine: No    Visit Vitals  BP (!) 86/60   Pulse 118   Temp 98.1 °F (36.7 °C)  (Axillary)   Resp 24   Ht 2' 5.33\" (0.745 m)   Wt 14.8 kg (32 lb 10.1 oz)   SpO2 99%   BMI 26.66 kg/m²     Growth percentiles:   <1 %ile (Z= -6.37) based on CDC (Boys, 2-20 Years) Stature-for-age data based on Stature recorded on 12/31/2019.  60 %ile (Z= 0.24) based on CDC (Boys, 2-20 Years) weight-for-age data using vitals from 12/31/2019.    PHYSICAL EXAM  General: In NAD  Skin: No eczema, rashes  HEENT: NCAT, PERRL, TMI, normal Nasal mucosa, Oropharynx - MMM, Neck supple, Tonsils  Chest: nl chest wall anatomy and symmetry, good aeration in all lung fields, no retractions, wheezing, crackles  CV: RRR no murmurs, nl cap refill, nl pulses  Abdomen: soft, NTND, nl BS, no masses  Ext: No cyanosis, clubbing, edema  Neuro: Reflexes 2+/2+ bilaterally    ASSESSMENT:  2 year old male with a history of bronchitis, croup, eczema, AR and recurrent ear infections who presents for evaluation and management of moderate persistent asthma.    PLAN:  · Rescue: ProAir HFA/MDI 4 puffs q4-6 hours as needed and 20 mins prior to activity.  · Controller: Stop Flovent 44 mcg. Flovent 110 mcg 2 puffs BID.  · Equipment Education: Use with spacer and mask.  · Asthma Education: Significant time spent with family reviewing diagnosis, treatment and prognosis.  · Asthma Action Plan: Filled and reviewed.  · Allergic Rhinitis: Claritin/Zyrtec daily as needed.  · Eczema: Use fragrance free emollients.    The patient was reminded:  1. Smoking is a deadly habit and should always be avoided.   2. Vaccinate annually against influenza.   3. Return to clinic for persistent or worsening symptoms.   4. The patient should be scheduled for a follow up visit in 2 months.     Abhinav Jensen MD  Pediatric Pulmonology  [facility]   [FreeTextEntry1] : PFT 2-19-19\par FVC 3.63 (89%)\par FEV1 2.87 (88%)\par FEV1/FVC 79%\par %\par RV 92%\par DLCO 109%\par \par EXAM: CT CHEST PROCEDURE DATE: 09/12/2018 \par INTERPRETATION: CT Chest without intravenous contrast \par History: History of left lower lobe pneumonia and empyema post decortication. Six-month follow-up. \par Comparison: CT chest 3/6/2018. \par FINDINGS: \par Lungs and large airways: Improvement in linear atelectasis within the lingula. There is curvilinear atelectasis unchanged in extent involving the lateral and posterior basal segment of the left lower lobe slightly improved. Scattered tree-in-bud centrilobular micronodules are noted compatible with mild small airway inflammation. No significant mosaic perfusion to suggest air-trapping. Stable 5 mm. Fissural right middle lobe nodule seen on image 156, series 3. \par Pleura: There is interval resolution of left-sided pleural effusion. No right pleural effusion. \par Lymph nodes: No thoracic lymphadenopathy. \par Mediastinum: Heart size is normal. No pericardial effusion. Mild coronary artery calcification. \par Vessels: Borderline aneurysmal dilatation of the ascending aorta measuring 4.0 cm. \par Chest wall and lower neck: Mild symmetric gynecomastia. \par Upper abdomen: Unremarkable. \par Bones: Partial visualization of marked ankylosis of lower cervical vertebral bodies, unchanged. \par \par IMPRESSION: \par 1. Interval resolution of left pleural effusion. \par 2. Borderline aneurysmal dilatation of the ascending aorta. \par 3. Improving linear atelectasis \par 4. Mild small airway inflammation without air trapping.

## 2020-01-06 ENCOUNTER — APPOINTMENT (OUTPATIENT)
Dept: SURGERY | Facility: CLINIC | Age: 65
End: 2020-01-06
Payer: MEDICARE

## 2020-01-06 VITALS
HEIGHT: 70 IN | BODY MASS INDEX: 30.92 KG/M2 | TEMPERATURE: 97.2 F | OXYGEN SATURATION: 98 % | DIASTOLIC BLOOD PRESSURE: 73 MMHG | WEIGHT: 216 LBS | SYSTOLIC BLOOD PRESSURE: 132 MMHG | HEART RATE: 78 BPM

## 2020-01-06 PROCEDURE — 99024 POSTOP FOLLOW-UP VISIT: CPT

## 2020-01-10 ENCOUNTER — APPOINTMENT (OUTPATIENT)
Dept: VASCULAR SURGERY | Facility: CLINIC | Age: 65
End: 2020-01-10

## 2020-01-29 NOTE — ASSESSMENT
[FreeTextEntry1] : Doing well s/p repair of hernia. We discussed gradual return to normal activity and natural scar maturation. I answered all questions. May return as needed.

## 2020-01-29 NOTE — HISTORY OF PRESENT ILLNESS
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 He comes to the office today with a right inguinal hernia that he believes may have been exacerbated by the heavy lifting he undertakes moving her and caring for her. He denies any obstructive type symptoms. The hernia is symptomatic in that is causes some pain and discomfort from time to time, but currently no pain. He has never had a colonoscopy. Of note he suffered from an empyema that required decortication with thoracic surgery in 2018. He reports his breathing is essentially normal at this time.  [de-identified] : 1-6-2020 Patient now s/p surgery, hernia repairs. Doing well. Feeling well. Denies any fevers, chills or shortness of breath. No pain to speak of. Concerned about getting back to care for his mother.

## 2020-02-03 NOTE — ED ADULT TRIAGE NOTE - WEIGHT IN LBS
Rest and drink extra fluids  Start antibiotic  Take probiotic  Cough medication as prescribed  Cool mist humidification can be helpful  Follow up with PCP if no improvement  Go to ER with worsening symptoms  Patient return to the office with no from pharmacy stating doxycycline was an allergy  She had multiple other allergies  This was updated in the chart  Antibiotic changed to azithromycin  Discussed with patient I would recommend her keep a list of all her allergies and what the reactions were to each of them  She verbalized understanding of these instructions  Acute Bronchitis   AMBULATORY CARE:   Acute bronchitis  is swelling and irritation in the air passages of your lungs  This irritation may cause you to cough or have other breathing problems  Acute bronchitis often starts because of another illness, such as a cold or the flu  The illness spreads from your nose and throat to your windpipe and airways  Bronchitis is often called a chest cold  Acute bronchitis lasts about 3 to 6 weeks and is usually not a serious illness  Your cough can last for several weeks  You may have any of the following symptoms:   · A cough with sputum that may be clear, yellow, or green    · Feeling more tired than usual, and body aches    · A fever and chills    · Wheezing when you breathe    · A tight chest or pain when you breathe or cough  Seek care immediately if:   · You cough up blood  · Your lips or fingernails turn blue  · You feel like you are not getting enough air when you breathe  Contact your healthcare provider if:   · You have a fever  · Your breathing problems do not go away or get worse  · Your cough does not get better within 4 weeks  · You have questions or concerns about your condition or care  Self-care:   · Get more rest   Rest helps your body to heal  Slowly start to do more each day  Rest when you feel it is needed  · Avoid irritants in the air    Avoid chemicals, fumes, and dust  Wear a face mask if you must work around dust or fumes  Stay inside on days when air pollution levels are high  If you have allergies, stay inside when pollen counts are high  Do not use aerosol products, such as spray-on deodorant, bug spray, and hair spray  · Do not smoke or be around others who smoke  Nicotine and other chemicals in cigarettes and cigars damages the cilia that move mucus out of your lungs  Ask your healthcare provider for information if you currently smoke and need help to quit  E-cigarettes or smokeless tobacco still contain nicotine  Talk to your healthcare provider before you use these products  · Drink liquids as directed  Liquids help keep your air passages moist and help you cough up mucus  You may need to drink more liquids when you have acute bronchitis  Ask how much liquid to drink each day and which liquids are best for you  · Use a humidifier or vaporizer  Use a cool mist humidifier or a vaporizer to increase air moisture in your home  This may make it easier for you to breathe and help decrease your cough  Prevent acute bronchitis by doing the following:   · Get the vaccinations you need  Ask your healthcare provider if you should get vaccinated against the flu or pneumonia  · Prevent the spread of germs  You can decrease your risk of acute bronchitis and other illnesses by doing the following:     [facility] your hands often with soap and water  Carry germ-killing hand lotion or gel with you  You can use the lotion or gel to clean your hands when soap and water are not available  ¨ Do not touch your eyes, nose, or mouth unless you have washed your hands first     ¨ Always cover your mouth when you cough to prevent the spread of germs  It is best to cough into a tissue or your shirt sleeve instead of into your hand  Ask those around you cover their mouths when they cough  ¨ Try to avoid people who have a cold or the flu   If you are sick, stay away from others as much as possible  Medicines: Your healthcare provider may  give you any of the following:  · Ibuprofen or acetaminophen  are medicines that help lower your fever  They are available without a doctor's order  Ask your healthcare provider which medicine is right for you  Ask how much to take and how often to take it  Follow directions  These medicines can cause stomach bleeding if not taken correctly  Ibuprofen can cause kidney damage  Do not take ibuprofen if you have kidney disease, an ulcer, or allergies to aspirin  Acetaminophen can cause liver damage  Do not take more than 4,000 milligrams in 24 hours  · Decongestants  help loosen mucus in your lungs and make it easier to cough up  This can help you breathe easier  · Cough suppressants  decrease your urge to cough  If your cough produces mucus, do not take a cough suppressant unless your healthcare provider tells you to  Your healthcare provider may suggest that you take a cough suppressant at night so you can rest     · Inhalers  may be given  Your healthcare provider may give you one or more inhalers to help you breathe easier and cough less  An inhaler gives your medicine to open your airways  Ask your healthcare provider to show you how to use your inhaler correctly  Follow up with your healthcare provider as directed:  Write down questions you have so you will remember to ask them during your follow-up visits  © 2017 [employer] Information is for End User's use only and may not be sold, redistributed or otherwise used for commercial purposes  All illustrations and images included in CareNotes® are the copyrighted property of [employer]  or [employer]  The above information is an  only  It is not intended as medical advice for individual conditions or treatments   Talk to your doctor, nurse or pharmacist before following any medical regimen to see if it is safe and effective for you  201.9

## 2020-02-10 ENCOUNTER — APPOINTMENT (OUTPATIENT)
Dept: GASTROENTEROLOGY | Facility: CLINIC | Age: 65
End: 2020-02-10

## 2020-02-10 ENCOUNTER — APPOINTMENT (OUTPATIENT)
Dept: SURGERY | Facility: CLINIC | Age: 65
End: 2020-02-10

## 2020-04-16 NOTE — PROGRESS NOTE ADULT - ASSESSMENT
62 year old M PMHx HTN, MVP, presented to OSH with SOB and pleuritic chest pain, admitted for PNA, treated with IV abx, L chest tube inserted, failed TPA on 1/4/18.  Patient was unhappy with care at OSH, so he left AMA. Presented to [facility] ED, CXR and CT chest revealed  pleural effusion/empyema.  S/P uncomplicated L VATS RA decortication. Jas Renner)  Internal Medicine  [address], [address]  [place] [address]  Phone: [phone]  Fax: [phone]  Follow Up Time: 1 month

## 2020-06-09 NOTE — ED ADULT NURSE NOTE - DISCHARGE DATE/TIME
PCP:  Manpreet Grayson MD    Chief Complaint   Patient presents with   • Left Knee - Pain       HPI:  Kelly Nguyen is a 57 year old female. Patient presents with left knee pain for about a month and ahalf. She has had cortisone injections in KATHY knees at [facility] with Dr. Vasquez. Patient takes ibuprofen and using ice to help with pain. Patient states pain is a Hard dull ache. Patient has been using stairs a lot since working at home for about 2 years. Patient states having more pain with using stairs.    Localizes the pain to the medial aspect of the knee.  Has difficulty going up and down stairs, getting up from a seated position, and has a reduced walking tolerance.  Visit Vitals  Ht 5' 4\" (1.626 m)   Wt 120.2 kg (265 lb)   BMI 45.49 kg/m²       Pain Scale: 5  Date of Injury:  On going for about a year  Work Related: no no  Current Position:         Past Medical History:   Diagnosis Date   • Asthma    • Stroke (cerebrum) (CMS/[facility]) 06/26/2012       No past surgical history on file.    Social History     Socioeconomic History   • Marital status: /Civil Union     Spouse name: Not on file   • Number of children: Not on file   • Years of education: Not on file   • Highest education level: Not on file   Occupational History   • Not on file   Social Needs   • Financial resource strain: Not on file   • Food insecurity:     Worry: Not on file     Inability: Not on file   • Transportation needs:     Medical: Not on file     Non-medical: Not on file   Tobacco Use   • Smoking status: Never Smoker   • Smokeless tobacco: Never Used   Substance and Sexual Activity   • Alcohol use: Yes     Alcohol/week: 5.0 standard drinks     Types: 5 Standard drinks or equivalent per week   • Drug use: Never   • Sexual activity: Not on file   Lifestyle   • Physical activity:     Days per week: Not on file     Minutes per session: Not on file   • Stress: Not on file   Relationships   • Social connections:      Talks on phone: Not on file     Gets together: Not on file     Attends Christianity service: Not on file     Active member of club or organization: Not on file     Attends meetings of clubs or organizations: Not on file     Relationship status: Not on file   • Intimate partner violence:     Fear of current or ex partner: Not on file     Emotionally abused: Not on file     Physically abused: Not on file     Forced sexual activity: Not on file   Other Topics Concern   • Not on file   Social History Narrative   • Not on file       ALLERGIES:  No Known Allergies    Current Outpatient Medications   Medication Sig Dispense Refill   • fluticasone (FLONASE) 50 MCG/ACT nasal spray Spray 2 sprays in each nostril daily. 16 g 12   • atorvastatin (LIPITOR) 10 MG tablet Take 1 tablet by mouth daily. 90 tablet 3   • levETIRAcetam (KEPPRA) 500 MG tablet Take 1 tablet by mouth 2 times daily. 180 tablet 3   • metoPROLOL tartrate (LOPRESSOR) 50 MG tablet Take 1 tablet by mouth 2 times daily (with meals). 180 tablet 3   • sertraline (ZOLOFT) 100 MG tablet Take 1 tablet by mouth daily. 90 tablet 3   • azithromycin (ZITHROMAX Z-BRUCE) 250 MG tablet 2 tablets day one, then 1 tablet days 2-5 6 tablet 0   • promethazine-dextromethorphan (PROMETHAZINE-DM) 6.25-15 MG/5ML syrup Take 5 mLs by mouth 4 times daily as needed for Cough. 180 mL 0   • hydrochlorothiazide (HYDRODIURIL) 25 MG tablet Take 1 tablet by mouth daily. 90 tablet 3     No current facility-administered medications for this visit.          Review of Systems:  All systems reviewed and are negative except as noted above or in the HPI/Patient intake form.    Physical Exam:  Constitutional: Oriented to person, place, and time. Appears well-developed and well-nourished. No distress.   HENT:   Head: Normocephalic and atraumatic.   Nose: No nasal deformity.   Mouth/Throat: Normal dentition.   Eyes: Conjunctivae and EOM are normal.   Cardiovascular: Normal rate and intact distal pulses.    Pulmonary/Chest: Effort normal. No stridor. No respiratory distress.   Neurological: Alert and oriented to person, place, and time.   Skin: Skin is warm and intact.   Psychiatric: Has a normal mood and affect. Behavior is normal.     Left Knee Exam     Tenderness   The patient is experiencing tenderness in the medial joint line.    Range of Motion   Extension: 5   Flexion: 120     Tests   Mireya:  Medial - negative Lateral - negative  Varus: negative Valgus: negative  Lachman:  Anterior - negative      Drawer:  Anterior - negative     Posterior - negative  Patellar apprehension: negative    Other   Erythema: absent  Sensation: normal  Pulse: present  Swelling: mild  Effusion: no effusion present    Comments:  Calf thigh impingement            Imaging Reading/Results:    XR KNEE MIN 4 VIEWS LEFT  Narrative: EXAM:  XR Knee left 4 views    INDICATION:  Left knee pain    FINDINGS:   PA 45 degree and AP Weightbearing, lateral, and sunrise views    were reviewed today.  Show severe arthritic changes in the Medial   compartment with joint space narrowing, subchondral sclerosis, and   osteophyte formation.  Impression: 1)  severe osteoarthritis of the left knee, medial compartment    L Inj/Asp: L knee on 6/9/2020 8:52 AM  Indications: pain  Details: 22 G needle, anterolateral approach  Medications: 40 mg methylPREDNISolone DEPOT 40 MG/ML; 5 mL lidocaine 1 %  Outcome: tolerated well, no immediate complications    PROCEDURE IN DETAIL:   Risks and benefits were discussed, and verbal consent was obtained to proceed with Steroid injection. The patient was placed in a seated position. The skin over the amanda-lateral knee was prepped with alcohol and Chloropep using aseptic technique. The skin was numbed with vapocoolant spray. A 22-gauge 1-1/2 inch needle was passed through the amanda-lateral portal site into the knee joint, and 1 ml of 40mg/ml Depomedrol and 5 ml 1% Lidocaine without Epinephrine were injected A bandage was  applied. The patient tolerated the procedures well, and no complications arose.    Procedure, treatment alternatives, risks and benefits explained, specific risks discussed. Consent was given by the patient. Immediately prior to procedure a time out was called to verify the correct patient, procedure, equipment, support staff and site/side marked as required. Patient was prepped and draped in the usual sterile fashion.           Assessment/Plan:    Primary osteoarthritis of left knee  - XR KNEE MIN 4 VIEWS LEFT      The patient has arthritis of the knee.  I reviewed the exam findings, radiological findings, and discussed the non-operative and operative treatment options including pros and cons.  Non-operative treatment includes weight loss, physical therapy, oral pain medication, cortisone injection, and viscosupplementation (gel injections).  Surgical treatment would consist of a knee replacement.    Patient is interested in a cortisone injection.  She tolerated the injection well.  Also recommending formal physical therapy and weight loss.  Follow-up in 6 to 8 weeks for repeat evaluation.      Al Lujan M.D    Orthopedic Surgery          SCRIBE SIGNATURE    I, Li Oneal CMA, scribed the services personally performed by Dr. Lujan.    SCRIBE ATTESTATION    Portions of this note were transcribed by Li Oneal CMA. IDr. Lujan personally performed the history, physical exam and medical decision making; and confirmed the accuracy of the information in the transcribed note.           07-Jan-2018 19:04

## 2020-07-21 ENCOUNTER — RX RENEWAL (OUTPATIENT)
Age: 65
End: 2020-07-21

## 2020-10-18 NOTE — DIETITIAN INITIAL EVALUATION ADULT. - DIET TYPE

## 2020-11-02 ENCOUNTER — TRANSCRIPTION ENCOUNTER (OUTPATIENT)
Age: 65
End: 2020-11-02

## 2020-11-12 ENCOUNTER — TRANSCRIPTION ENCOUNTER (OUTPATIENT)
Age: 65
End: 2020-11-12

## 2021-03-02 ENCOUNTER — TRANSCRIPTION ENCOUNTER (OUTPATIENT)
Age: 66
End: 2021-03-02

## 2021-03-16 ENCOUNTER — APPOINTMENT (OUTPATIENT)
Dept: INTERNAL MEDICINE | Facility: CLINIC | Age: 66
End: 2021-03-16
Payer: MEDICARE

## 2021-03-16 VITALS
DIASTOLIC BLOOD PRESSURE: 77 MMHG | OXYGEN SATURATION: 95 % | SYSTOLIC BLOOD PRESSURE: 135 MMHG | HEART RATE: 75 BPM | TEMPERATURE: 98.3 F | BODY MASS INDEX: 31.57 KG/M2 | WEIGHT: 220 LBS

## 2021-03-16 PROCEDURE — 99205 OFFICE O/P NEW HI 60 MIN: CPT | Mod: 95

## 2021-04-23 ENCOUNTER — OUTPATIENT (OUTPATIENT)
Dept: OUTPATIENT SERVICES | Facility: HOSPITAL | Age: 66
LOS: 1 days | End: 2021-04-23
Payer: MEDICARE

## 2021-04-23 ENCOUNTER — APPOINTMENT (OUTPATIENT)
Dept: MRI IMAGING | Facility: HOSPITAL | Age: 66
End: 2021-04-23
Payer: MEDICARE

## 2021-04-23 ENCOUNTER — RESULT REVIEW (OUTPATIENT)
Age: 66
End: 2021-04-23

## 2021-04-23 ENCOUNTER — APPOINTMENT (OUTPATIENT)
Dept: CT IMAGING | Facility: HOSPITAL | Age: 66
End: 2021-04-23

## 2021-04-23 DIAGNOSIS — Z93.8 OTHER ARTIFICIAL OPENING STATUS: Chronic | ICD-10-CM

## 2021-04-23 PROCEDURE — 71555 MRI ANGIO CHEST W OR W/O DYE: CPT | Mod: 26,MH

## 2021-04-23 PROCEDURE — 71250 CT THORAX DX C-: CPT | Mod: 26,ME

## 2021-04-23 PROCEDURE — 71555 MRI ANGIO CHEST W OR W/O DYE: CPT

## 2021-04-23 PROCEDURE — 71250 CT THORAX DX C-: CPT

## 2021-04-23 PROCEDURE — G1004: CPT

## 2021-04-26 ENCOUNTER — NON-APPOINTMENT (OUTPATIENT)
Age: 66
End: 2021-04-26

## 2021-04-26 LAB — SARS-COV-2 N GENE NPH QL NAA+PROBE: NOT DETECTED

## 2021-04-27 ENCOUNTER — TRANSCRIPTION ENCOUNTER (OUTPATIENT)
Age: 66
End: 2021-04-27

## 2021-04-27 ENCOUNTER — APPOINTMENT (OUTPATIENT)
Dept: PULMONOLOGY | Facility: CLINIC | Age: 66
End: 2021-04-27

## 2021-04-28 ENCOUNTER — TRANSCRIPTION ENCOUNTER (OUTPATIENT)
Age: 66
End: 2021-04-28

## 2021-04-28 LAB
ALBUMIN SERPL ELPH-MCNC: 4.8 G/DL
ALP BLD-CCNC: 70 U/L
ALT SERPL-CCNC: 14 U/L
ANA PAT FLD IF-IMP: ABNORMAL
ANA SER IF-ACNC: ABNORMAL
ANION GAP SERPL CALC-SCNC: 19 MMOL/L
AST SERPL-CCNC: 16 U/L
BILIRUB SERPL-MCNC: 0.8 MG/DL
BUN SERPL-MCNC: 16 MG/DL
CALCIUM SERPL-MCNC: 9.8 MG/DL
CHLORIDE SERPL-SCNC: 101 MMOL/L
CO2 SERPL-SCNC: 22 MMOL/L
CREAT SERPL-MCNC: 1.08 MG/DL
CRP SERPL-MCNC: <3 MG/L
ERYTHROCYTE [SEDIMENTATION RATE] IN BLOOD BY WESTERGREN METHOD: 2 MM/HR
GLUCOSE SERPL-MCNC: 96 MG/DL
POTASSIUM SERPL-SCNC: 5 MMOL/L
PROT SERPL-MCNC: 7.2 G/DL
RHEUMATOID FACT SER QL: <10 IU/ML
SODIUM SERPL-SCNC: 142 MMOL/L

## 2021-05-05 ENCOUNTER — APPOINTMENT (OUTPATIENT)
Dept: CARDIOTHORACIC SURGERY | Facility: CLINIC | Age: 66
End: 2021-05-05
Payer: MEDICARE

## 2021-05-05 ENCOUNTER — TRANSCRIPTION ENCOUNTER (OUTPATIENT)
Age: 66
End: 2021-05-05

## 2021-05-05 PROCEDURE — 99443: CPT | Mod: 95

## 2021-05-12 ENCOUNTER — APPOINTMENT (OUTPATIENT)
Dept: HEART AND VASCULAR | Facility: CLINIC | Age: 66
End: 2021-05-12

## 2021-05-12 ENCOUNTER — APPOINTMENT (OUTPATIENT)
Dept: HEART AND VASCULAR | Facility: CLINIC | Age: 66
End: 2021-05-12
Payer: MEDICARE

## 2021-05-12 VITALS
TEMPERATURE: 97 F | OXYGEN SATURATION: 98 % | HEART RATE: 80 BPM | SYSTOLIC BLOOD PRESSURE: 120 MMHG | DIASTOLIC BLOOD PRESSURE: 60 MMHG | HEIGHT: 70 IN

## 2021-05-12 PROCEDURE — 99214 OFFICE O/P EST MOD 30 MIN: CPT | Mod: 25

## 2021-05-12 PROCEDURE — 93015 CV STRESS TEST SUPVJ I&R: CPT

## 2021-05-12 PROCEDURE — 93306 TTE W/DOPPLER COMPLETE: CPT

## 2021-05-12 NOTE — REASON FOR VISIT
[Symptom and Test Evaluation] : symptom and test evaluation [Structural Heart and Valve Disease] : structural heart and valve disease

## 2021-05-12 NOTE — PHYSICAL EXAM
[Well Developed] : well developed [Well Nourished] : well nourished [No Acute Distress] : no acute distress [Normal Conjunctiva] : normal conjunctiva [Normal Venous Pressure] : normal venous pressure [No Carotid Bruit] : no carotid bruit [Normal S1, S2] : normal S1, S2 [No Rub] : no rub [No Gallop] : no gallop [Murmur] : murmur [Clear Lung Fields] : clear lung fields [Good Air Entry] : good air entry [No Respiratory Distress] : no respiratory distress  [Soft] : abdomen soft [Non Tender] : non-tender [No Masses/organomegaly] : no masses/organomegaly [Normal Bowel Sounds] : normal bowel sounds [Normal Gait] : normal gait [No Edema] : no edema [No Cyanosis] : no cyanosis [No Clubbing] : no clubbing [No Rash] : no rash [Moves all extremities] : moves all extremities [Normal Speech] : normal speech [No Focal Deficits] : no focal deficits [Alert and Oriented] : alert and oriented [Normal memory] : normal memory

## 2021-05-12 NOTE — DISCUSSION/SUMMARY
[FreeTextEntry1] : sob/ subaortic membrane- compared to prior studies, there is no change, results discussed/reassurance given\par prior notes reviewed\par stable exam, f/u 1 year

## 2021-05-17 ENCOUNTER — APPOINTMENT (OUTPATIENT)
Dept: INTERNAL MEDICINE | Facility: CLINIC | Age: 66
End: 2021-05-17
Payer: MEDICARE

## 2021-05-17 ENCOUNTER — TRANSCRIPTION ENCOUNTER (OUTPATIENT)
Age: 66
End: 2021-05-17

## 2021-05-17 PROCEDURE — 99214 OFFICE O/P EST MOD 30 MIN: CPT | Mod: 95

## 2021-05-26 ENCOUNTER — OUTPATIENT (OUTPATIENT)
Dept: OUTPATIENT SERVICES | Facility: HOSPITAL | Age: 66
LOS: 1 days | End: 2021-05-26
Payer: MEDICARE

## 2021-05-26 DIAGNOSIS — Z93.8 OTHER ARTIFICIAL OPENING STATUS: Chronic | ICD-10-CM

## 2021-05-26 PROCEDURE — 73521 X-RAY EXAM HIPS BI 2 VIEWS: CPT

## 2021-05-26 PROCEDURE — 73560 X-RAY EXAM OF KNEE 1 OR 2: CPT | Mod: 26,50

## 2021-05-26 PROCEDURE — 73100 X-RAY EXAM OF WRIST: CPT | Mod: 26,50

## 2021-05-26 PROCEDURE — 73120 X-RAY EXAM OF HAND: CPT

## 2021-05-26 PROCEDURE — 72040 X-RAY EXAM NECK SPINE 2-3 VW: CPT

## 2021-05-26 PROCEDURE — 73100 X-RAY EXAM OF WRIST: CPT

## 2021-05-26 PROCEDURE — 73120 X-RAY EXAM OF HAND: CPT | Mod: 26,50

## 2021-05-26 PROCEDURE — 73560 X-RAY EXAM OF KNEE 1 OR 2: CPT

## 2021-05-26 PROCEDURE — 72040 X-RAY EXAM NECK SPINE 2-3 VW: CPT | Mod: 26

## 2021-05-26 PROCEDURE — 73521 X-RAY EXAM HIPS BI 2 VIEWS: CPT | Mod: 26

## 2021-06-09 ENCOUNTER — TRANSCRIPTION ENCOUNTER (OUTPATIENT)
Age: 66
End: 2021-06-09

## 2021-06-09 DIAGNOSIS — H53.9 UNSPECIFIED VISUAL DISTURBANCE: ICD-10-CM

## 2021-06-10 ENCOUNTER — OUTPATIENT (OUTPATIENT)
Dept: OUTPATIENT SERVICES | Facility: HOSPITAL | Age: 66
LOS: 1 days | End: 2021-06-10
Payer: MEDICARE

## 2021-06-10 ENCOUNTER — APPOINTMENT (OUTPATIENT)
Dept: MRI IMAGING | Facility: HOSPITAL | Age: 66
End: 2021-06-10

## 2021-06-10 DIAGNOSIS — Z93.8 OTHER ARTIFICIAL OPENING STATUS: Chronic | ICD-10-CM

## 2021-06-10 PROCEDURE — 72141 MRI NECK SPINE W/O DYE: CPT

## 2021-06-10 PROCEDURE — 72141 MRI NECK SPINE W/O DYE: CPT | Mod: 26,MH

## 2021-06-24 ENCOUNTER — APPOINTMENT (OUTPATIENT)
Dept: OPHTHALMOLOGY | Facility: CLINIC | Age: 66
End: 2021-06-24
Payer: MEDICARE

## 2021-06-24 ENCOUNTER — NON-APPOINTMENT (OUTPATIENT)
Age: 66
End: 2021-06-24

## 2021-06-24 PROCEDURE — 92083 EXTENDED VISUAL FIELD XM: CPT

## 2021-06-24 PROCEDURE — 92004 COMPRE OPH EXAM NEW PT 1/>: CPT

## 2021-06-24 PROCEDURE — 76514 ECHO EXAM OF EYE THICKNESS: CPT

## 2021-06-24 PROCEDURE — 92133 CPTRZD OPH DX IMG PST SGM ON: CPT

## 2021-06-24 PROCEDURE — 76513 OPH US DX ANT SGM US UNI/BI: CPT | Mod: RT

## 2021-06-29 ENCOUNTER — TRANSCRIPTION ENCOUNTER (OUTPATIENT)
Age: 66
End: 2021-06-29

## 2021-07-06 ENCOUNTER — APPOINTMENT (OUTPATIENT)
Dept: INTERNAL MEDICINE | Facility: CLINIC | Age: 66
End: 2021-07-06
Payer: MEDICARE

## 2021-07-06 DIAGNOSIS — M54.2 CERVICALGIA: ICD-10-CM

## 2021-07-06 DIAGNOSIS — E34.9 ENDOCRINE DISORDER, UNSPECIFIED: ICD-10-CM

## 2021-07-06 DIAGNOSIS — R76.8 OTHER SPECIFIED ABNORMAL IMMUNOLOGICAL FINDINGS IN SERUM: ICD-10-CM

## 2021-07-06 PROCEDURE — 99215 OFFICE O/P EST HI 40 MIN: CPT | Mod: 95

## 2021-07-07 ENCOUNTER — NON-APPOINTMENT (OUTPATIENT)
Age: 66
End: 2021-07-07

## 2021-07-07 ENCOUNTER — APPOINTMENT (OUTPATIENT)
Dept: INTERNAL MEDICINE | Facility: CLINIC | Age: 66
End: 2021-07-07
Payer: MEDICARE

## 2021-07-07 VITALS — HEIGHT: 70 IN | WEIGHT: 225 LBS | BODY MASS INDEX: 32.21 KG/M2

## 2021-07-07 PROCEDURE — 97802 MEDICAL NUTRITION INDIV IN: CPT | Mod: GA

## 2021-07-08 ENCOUNTER — TRANSCRIPTION ENCOUNTER (OUTPATIENT)
Age: 66
End: 2021-07-08

## 2021-07-08 LAB
ANA SER IF-ACNC: NEGATIVE
CALCIUM SERPL-MCNC: 9.8 MG/DL
CRP SERPL-MCNC: <3 MG/L
ERYTHROCYTE [SEDIMENTATION RATE] IN BLOOD BY WESTERGREN METHOD: < 2 MM/HR
PARATHYROID HORMONE INTACT: 56 PG/ML
RHEUMATOID FACT SER QL: <10 IU/ML
THYROGLOB AB SERPL-ACNC: <20 IU/ML
THYROPEROXIDASE AB SERPL IA-ACNC: 21.9 IU/ML
TSH SERPL-ACNC: 2.12 UIU/ML
URATE SERPL-MCNC: 6.6 MG/DL

## 2021-07-09 ENCOUNTER — TRANSCRIPTION ENCOUNTER (OUTPATIENT)
Age: 66
End: 2021-07-09

## 2021-07-15 ENCOUNTER — RX RENEWAL (OUTPATIENT)
Age: 66
End: 2021-07-15

## 2021-07-15 ENCOUNTER — TRANSCRIPTION ENCOUNTER (OUTPATIENT)
Age: 66
End: 2021-07-15

## 2021-07-15 LAB — HLA-B27 RELATED AG QL: NEGATIVE

## 2021-07-20 ENCOUNTER — TRANSCRIPTION ENCOUNTER (OUTPATIENT)
Age: 66
End: 2021-07-20

## 2021-07-30 ENCOUNTER — TRANSCRIPTION ENCOUNTER (OUTPATIENT)
Age: 66
End: 2021-07-30

## 2021-08-04 ENCOUNTER — APPOINTMENT (OUTPATIENT)
Dept: PHYSICAL MEDICINE AND REHAB | Facility: CLINIC | Age: 66
End: 2021-08-04
Payer: MEDICARE

## 2021-08-04 ENCOUNTER — OUTPATIENT (OUTPATIENT)
Dept: OUTPATIENT SERVICES | Facility: HOSPITAL | Age: 66
LOS: 1 days | End: 2021-08-04
Payer: MEDICARE

## 2021-08-04 ENCOUNTER — APPOINTMENT (OUTPATIENT)
Dept: INTERNAL MEDICINE | Facility: CLINIC | Age: 66
End: 2021-08-04
Payer: MEDICARE

## 2021-08-04 VITALS — WEIGHT: 228 LBS | HEIGHT: 70 IN | BODY MASS INDEX: 32.64 KG/M2

## 2021-08-04 VITALS — WEIGHT: 225 LBS | BODY MASS INDEX: 32.21 KG/M2 | HEIGHT: 70 IN

## 2021-08-04 DIAGNOSIS — Z93.8 OTHER ARTIFICIAL OPENING STATUS: Chronic | ICD-10-CM

## 2021-08-04 PROCEDURE — 73630 X-RAY EXAM OF FOOT: CPT | Mod: 26,50

## 2021-08-04 PROCEDURE — 99204 OFFICE O/P NEW MOD 45 MIN: CPT

## 2021-08-04 PROCEDURE — 73630 X-RAY EXAM OF FOOT: CPT

## 2021-08-04 PROCEDURE — 97803 MED NUTRITION INDIV SUBSEQ: CPT | Mod: GA

## 2021-08-04 NOTE — ASSESSMENT
[FreeTextEntry1] : Impression:\par 1. Cervical Spondylosis/DISH\par 2. C5/6 + C6/7 Disc Protrusion\par \par Plan: The history and physical examination were reviewed along with recent imaging. Symptoms seem consistent with predominant spondylosis/DISH with underlying disc protrusion. The imaging and diagnosis were discussed with the patient along with treatment options including physical therapy, oral medication, interventional spine procedures, and surgery; as well as alternative therapeutics such as acupuncture and massage. We also discussed the degenerative nature of the condition along with the importance of activity modification, ergonomics, and posture in the long term management of the condition. At this time I am recommending that the patient undergo a course of physical therapy. We emphasized the importance of the home exercise program. The patient was educated on red flag symptoms and was instructed to call the office should the current condition worsen or new symptoms develop. Depending on his response we will consider L[place]ON. The patient will follow up in 6-8 weeks. The patient expressed verbal understanding and is in agreement with the plan of care. All of the patient's questions and concerns were addressed during today's visit.\par

## 2021-08-04 NOTE — DATA REVIEWED
[MRI] : MRI [FreeTextEntry1] : MR ALFONSO 06/2021: C5-6 OPLL, disc osteophyte eccentric to the left paracentral zone. C6-7 OPLL, right asymmetric disc osteophyte at the right paracentral zone. Findings of DISH bridging C3-6.

## 2021-08-04 NOTE — PHYSICAL EXAM
[FreeTextEntry1] : CERVICAL\par GEN: AAOx3. NAD.\par CERVICAL ROM: Flexion full/pain free. Extension, rotation, oblique extension limited (+) axial Sx. \par SHOULDER ROM: Full and pain free B/L.\par PALP: No TTP midline spinous processes, paravertebral muscles, trapezius, levator scapulae or shoulder region B/L.\par INSP: Spine alignment is midline, No evidence of scoliosis.\par STRENGTH: 5/5 bilateral shoulder abductors, elbow flexors, elbow extensors, wrist extensors, hand intrinsics, long finger flexors to D3 and D5.\par TONE: Normal, No clonus.\par REFLEXES: Symmetric biceps, triceps, brachioradialis, pronator teres. Guy's (-) B/L.\par SENSATION: Grossly intact LT BUE.\par GAIT: Non antalgic, Normal reciprocating heel to toe.\par SPECIAL: Spurling's (+) Left. Axial Compression (-). \par

## 2021-08-04 NOTE — HISTORY OF PRESENT ILLNESS
[FreeTextEntry1] : Mr. TOM PATEL is a very pleasant 66 year male who comes in for evaluation of neck pain that has been ongoing for 6 months without any specific injury or inciting event. The pain is located primarily on neck pain radiating to bilateral shoulders, left>>right, intermittent and described as achy, limited range of motion and sharp. The pain is rated as 6/10 and ranges from 4-9/10. The patient's symptoms are aggravated when waking up, prolonged walking and alleviated by heat pads, Voltaren and Acetaminophen. The patient denies any night pain, numbness/tingling, weakness, or bowel/bladder dysfunction. The patient has no other complaints at this time.\par

## 2021-08-13 ENCOUNTER — TRANSCRIPTION ENCOUNTER (OUTPATIENT)
Age: 66
End: 2021-08-13

## 2021-08-17 ENCOUNTER — TRANSCRIPTION ENCOUNTER (OUTPATIENT)
Age: 66
End: 2021-08-17

## 2021-08-20 NOTE — ED ADULT TRIAGE NOTE - BMI (KG/M2)
2000. Bedside and Verbal shift change report given to [address] (oncoming nurse) by Nusrat Silverman (offgoing nurse). Report included the following information SBAR, Kardex, OR Summary, MAR, Accordion, Cardiac Rhythm nsr, Alarm Parameters  and Dual Neuro Assessment. 0800. Bedside and Verbal shift change report given to Nusrat Silverman (oncoming nurse) by [address] (offgoing nurse). Report included the following information SBAR, Kardex, Intake/Output, MAR, Accordion, Recent Results, Cardiac Rhythm nsr, Alarm Parameters  and Dual Neuro Assessment. Shift Summary:  Pt c/o ongoing intermittent headache, receives relief with fioricet and reglan prn and scheduled toradol. Has some dizziness when up to bsc. To radiology for CT of head, tolerated well. Aside from headache, pt is neurologically intact. 29

## 2021-09-27 ENCOUNTER — APPOINTMENT (OUTPATIENT)
Dept: OPHTHALMOLOGY | Facility: CLINIC | Age: 66
End: 2021-09-27
Payer: MEDICARE

## 2021-09-27 ENCOUNTER — NON-APPOINTMENT (OUTPATIENT)
Age: 66
End: 2021-09-27

## 2021-09-27 PROCEDURE — 66761 REVISION OF IRIS: CPT | Mod: RT

## 2021-10-11 ENCOUNTER — APPOINTMENT (OUTPATIENT)
Dept: OPHTHALMOLOGY | Facility: CLINIC | Age: 66
End: 2021-10-11
Payer: MEDICARE

## 2021-10-11 ENCOUNTER — NON-APPOINTMENT (OUTPATIENT)
Age: 66
End: 2021-10-11

## 2021-10-11 PROCEDURE — 66761 REVISION OF IRIS: CPT | Mod: LT

## 2021-10-18 ENCOUNTER — TRANSCRIPTION ENCOUNTER (OUTPATIENT)
Age: 66
End: 2021-10-18

## 2021-10-18 DIAGNOSIS — H91.90 UNSPECIFIED HEARING LOSS, UNSPECIFIED EAR: ICD-10-CM

## 2021-10-19 ENCOUNTER — TRANSCRIPTION ENCOUNTER (OUTPATIENT)
Age: 66
End: 2021-10-19

## 2021-10-27 ENCOUNTER — APPOINTMENT (OUTPATIENT)
Dept: INTERNAL MEDICINE | Facility: CLINIC | Age: 66
End: 2021-10-27

## 2021-11-04 ENCOUNTER — TRANSCRIPTION ENCOUNTER (OUTPATIENT)
Age: 66
End: 2021-11-04

## 2021-11-11 ENCOUNTER — APPOINTMENT (OUTPATIENT)
Dept: OPHTHALMOLOGY | Facility: CLINIC | Age: 66
End: 2021-11-11
Payer: MEDICARE

## 2021-11-11 ENCOUNTER — NON-APPOINTMENT (OUTPATIENT)
Age: 66
End: 2021-11-11

## 2021-11-11 PROCEDURE — 76513 OPH US DX ANT SGM US UNI/BI: CPT | Mod: LT

## 2021-11-11 PROCEDURE — 99213 OFFICE O/P EST LOW 20 MIN: CPT

## 2021-11-11 PROCEDURE — 92083 EXTENDED VISUAL FIELD XM: CPT

## 2021-11-15 ENCOUNTER — APPOINTMENT (OUTPATIENT)
Dept: INTERNAL MEDICINE | Facility: CLINIC | Age: 66
End: 2021-11-15
Payer: MEDICARE

## 2021-11-15 DIAGNOSIS — R53.83 OTHER FATIGUE: ICD-10-CM

## 2021-11-15 DIAGNOSIS — B36.9 SUPERFICIAL MYCOSIS, UNSPECIFIED: ICD-10-CM

## 2021-11-15 PROCEDURE — 99215 OFFICE O/P EST HI 40 MIN: CPT | Mod: 95

## 2021-11-16 ENCOUNTER — TRANSCRIPTION ENCOUNTER (OUTPATIENT)
Age: 66
End: 2021-11-16

## 2021-11-18 ENCOUNTER — TRANSCRIPTION ENCOUNTER (OUTPATIENT)
Age: 66
End: 2021-11-18

## 2021-11-19 ENCOUNTER — APPOINTMENT (OUTPATIENT)
Dept: UROLOGY | Facility: CLINIC | Age: 66
End: 2021-11-19

## 2021-11-29 ENCOUNTER — APPOINTMENT (OUTPATIENT)
Dept: PHYSICAL MEDICINE AND REHAB | Facility: CLINIC | Age: 66
End: 2021-11-29
Payer: MEDICARE

## 2021-11-29 VITALS — WEIGHT: 228 LBS | HEIGHT: 70 IN | BODY MASS INDEX: 32.64 KG/M2

## 2021-11-29 PROCEDURE — 99214 OFFICE O/P EST MOD 30 MIN: CPT

## 2021-11-30 ENCOUNTER — TRANSCRIPTION ENCOUNTER (OUTPATIENT)
Age: 66
End: 2021-11-30

## 2021-12-01 ENCOUNTER — APPOINTMENT (OUTPATIENT)
Dept: HEART AND VASCULAR | Facility: CLINIC | Age: 66
End: 2021-12-01

## 2021-12-07 ENCOUNTER — RX RENEWAL (OUTPATIENT)
Age: 66
End: 2021-12-07

## 2021-12-14 NOTE — HISTORY OF PRESENT ILLNESS
[FreeTextEntry1] : Mr. TOM PATEL is a very pleasant 66 year male who comes in for follow up of neck pain after participating in PT since his previous visit. The patient feels that he has been benefiting from PT. There is still some residual pain located primarily on neck pain radiating to bilateral shoulders, left>>right, intermittent and described as achy, limited range of motion and sharp. The pain is rated as 6/10 and ranges from 4-9/10. The patient's symptoms are aggravated when waking up, prolonged walking and alleviated by heat pads, Voltaren and Acetaminophen. The patient denies any night pain, numbness/tingling, weakness, or bowel/bladder dysfunction. The patient has no other complaints at this time.\par

## 2021-12-14 NOTE — ASSESSMENT
[FreeTextEntry1] : Impression:\par 1. Cervical Spondylosis/DISH\par 2. C5/6 + C6/7 Disc Protrusion\par \par Plan: The history and physical examination were reviewed along with imaging. The patient has shown improvement with physical therapy and wishes to continue. He also inquired about the possibility of benefit from a muscle relaxer medication which has helped him in the past. The imaging and diagnosis were reviewed with the patient along with treatment options. We will continue with his current PT program and re-emphasized the importance of the HEP. I have provided a Rx for Xanaflex for him to trial for symptomatic relief. We discussed the medication in detail with regard to appropriate use, adverse effects, and expected outcome. Depending on his response we will consider L[place]ON. The patient will follow up in 6-8 weeks. The patient expressed verbal understanding and is in agreement with the plan of care. All of the patient's questions and concerns were addressed during today's visit.

## 2021-12-16 ENCOUNTER — APPOINTMENT (OUTPATIENT)
Dept: HEART AND VASCULAR | Facility: CLINIC | Age: 66
End: 2021-12-16
Payer: MEDICARE

## 2021-12-16 VITALS
HEIGHT: 70 IN | WEIGHT: 228 LBS | DIASTOLIC BLOOD PRESSURE: 80 MMHG | HEART RATE: 79 BPM | OXYGEN SATURATION: 97 % | TEMPERATURE: 98 F | SYSTOLIC BLOOD PRESSURE: 120 MMHG | RESPIRATION RATE: 12 BRPM | BODY MASS INDEX: 32.64 KG/M2

## 2021-12-16 PROCEDURE — 99214 OFFICE O/P EST MOD 30 MIN: CPT

## 2021-12-16 PROCEDURE — 93000 ELECTROCARDIOGRAM COMPLETE: CPT

## 2021-12-16 NOTE — PHYSICAL EXAM
[Well Developed] : well developed [Well Nourished] : well nourished [No Acute Distress] : no acute distress [Normal Conjunctiva] : normal conjunctiva [Normal Venous Pressure] : normal venous pressure [No Carotid Bruit] : no carotid bruit [Normal S1, S2] : normal S1, S2 [No Rub] : no rub [No Gallop] : no gallop [Murmur] : murmur [Clear Lung Fields] : clear lung fields [Good Air Entry] : good air entry [No Respiratory Distress] : no respiratory distress  [Soft] : abdomen soft [Non Tender] : non-tender [No Masses/organomegaly] : no masses/organomegaly [Normal Bowel Sounds] : normal bowel sounds [Normal Gait] : normal gait [No Edema] : no edema [No Cyanosis] : no cyanosis [No Clubbing] : no clubbing [No Rash] : no rash [Moves all extremities] : moves all extremities [No Focal Deficits] : no focal deficits [Normal Speech] : normal speech [Alert and Oriented] : alert and oriented [Normal memory] : normal memory [de-identified] : 2/6

## 2021-12-16 NOTE — DISCUSSION/SUMMARY
[FreeTextEntry1] : stable exam, ecg in office\par subaortic membrane/TAA stable, repeat echo in april\par HTN stable\par Lipids stable\par ok to exercise at moderate level, no heavy weights

## 2021-12-16 NOTE — HISTORY OF PRESENT ILLNESS
[FreeTextEntry1] : feels well\par wants to increase exercise\par subaortic membrane/TAA recent echo reviewed\par HTN bp contolled\par Lipids- on statin

## 2021-12-21 ENCOUNTER — TRANSCRIPTION ENCOUNTER (OUTPATIENT)
Age: 66
End: 2021-12-21

## 2021-12-30 ENCOUNTER — APPOINTMENT (OUTPATIENT)
Dept: UROLOGY | Facility: CLINIC | Age: 66
End: 2021-12-30

## 2021-12-30 ENCOUNTER — TRANSCRIPTION ENCOUNTER (OUTPATIENT)
Age: 66
End: 2021-12-30

## 2022-02-10 ENCOUNTER — RX RENEWAL (OUTPATIENT)
Age: 67
End: 2022-02-10

## 2022-03-02 ENCOUNTER — NON-APPOINTMENT (OUTPATIENT)
Age: 67
End: 2022-03-02

## 2022-03-03 ENCOUNTER — APPOINTMENT (OUTPATIENT)
Dept: UROLOGY | Facility: CLINIC | Age: 67
End: 2022-03-03
Payer: MEDICARE

## 2022-03-03 ENCOUNTER — NON-APPOINTMENT (OUTPATIENT)
Age: 67
End: 2022-03-03

## 2022-03-03 VITALS — HEART RATE: 77 BPM | DIASTOLIC BLOOD PRESSURE: 66 MMHG | SYSTOLIC BLOOD PRESSURE: 129 MMHG | TEMPERATURE: 98 F

## 2022-03-03 LAB
BILIRUB UR QL STRIP: NEGATIVE
CLARITY UR: CLEAR
COLLECTION METHOD: NORMAL
GLUCOSE UR-MCNC: NEGATIVE
HCG UR QL: 2 EU/DL
HGB UR QL STRIP.AUTO: NEGATIVE
KETONES UR-MCNC: NEGATIVE
LEUKOCYTE ESTERASE UR QL STRIP: NEGATIVE
NITRITE UR QL STRIP: NEGATIVE
PH UR STRIP: 7.5
PROT UR STRIP-MCNC: NORMAL
SP GR UR STRIP: 1.01

## 2022-03-03 PROCEDURE — 81003 URINALYSIS AUTO W/O SCOPE: CPT | Mod: QW

## 2022-03-03 PROCEDURE — 99204 OFFICE O/P NEW MOD 45 MIN: CPT

## 2022-03-03 NOTE — HISTORY OF PRESENT ILLNESS
[FreeTextEntry1] : 67 YO M seen TODAY 3/3/2022 as NPT for inguinal hernia. ED by history. He told me that he had a bilateral inguinal hernia repair by laparoscopy with mesh 12/2019. He also had a left lung decortication 2018 for pneumonia which progressed to empyema. He was caring for his invalid mother who passed last year. After that he noted difficulty achieving and maintaining erection ( for 6 - 8 months, may have been present longer). He has not tried any treatment yet. He has also noted mild right groin discomfort intermittently over the last 2 - 3 months. No problems with urination, Nocturia x 2 - 3. \par \par Patient on medication for  HTN, glaucoma, cholesterol. NKMA, allergic to seafood.\par  The patient denies fevers, chills, nausea and or vomiting and no unexplained weight loss. \par All pertinent parts of the patient PFSH (past medical, family and social histories), laboratory, radiological studies and physician notes were reviewed prior to starting the face to face portion of the  visit. Questionnaire results were discussed with patient.\par \par

## 2022-03-03 NOTE — ASSESSMENT
[FreeTextEntry1] : We discussed possible causes of his ED with low libido and I recommended that we check a full hormonal panel in an AM blood draw. We will also check the PSA at that time. We also discussed treatment of his ED with PDE5i medication and he will consider this option. We reviewed the indications, risks, alternatives and chances for success with this therapy.We will readdress this after we have the results of the blood work. Before prescribing any PDE5i treatment, we will clear this with his Cardiologist, Souleymane Bartholomew. Dasia Corrigan MD\par

## 2022-03-03 NOTE — LETTER BODY
[Dear  ___] : Dear  [unfilled], [Consult Letter:] : I had the pleasure of evaluating your patient, [unfilled]. [Please see my note below.] : Please see my note below. [Consult Closing:] : Thank you very much for allowing me to participate in the care of this patient.  If you have any questions, please do not hesitate to contact me. [FreeTextEntry3] : Best Regards, \par \par Dasia Corrigan MD\par  [DrChey  ___] : Dr. MOLINA

## 2022-03-03 NOTE — PHYSICAL EXAM
[General Appearance - Well Developed] : well developed [General Appearance - Well Nourished] : well nourished [Normal Appearance] : normal appearance [Well Groomed] : well groomed [General Appearance - In No Acute Distress] : no acute distress [Edema] : no peripheral edema [Respiration, Rhythm And Depth] : normal respiratory rhythm and effort [Exaggerated Use Of Accessory Muscles For Inspiration] : no accessory muscle use [Abdomen Soft] : soft [Abdomen Tenderness] : non-tender [Abdomen Hernia] : no hernia was discovered [Costovertebral Angle Tenderness] : no ~M costovertebral angle tenderness [Urethral Meatus] : meatus normal [Urinary Bladder Findings] : the bladder was normal on palpation [Scrotum] : the scrotum was normal [Epididymis] : the epididymides were normal [Testes Mass (___cm)] : there were no testicular masses [Prostate Tenderness] : the prostate was not tender [No Prostate Nodules] : no prostate nodules [Prostate Size ___ (0-4)] : prostate size [unfilled] (scale: 0-4) [Normal Station and Gait] : the gait and station were normal for the patient's age [] : no rash [No Focal Deficits] : no focal deficits [Oriented To Time, Place, And Person] : oriented to person, place, and time [Affect] : the affect was normal [Mood] : the mood was normal [Not Anxious] : not anxious [FreeTextEntry1] : Mild tenderness right testis to palpation.

## 2022-03-14 ENCOUNTER — NON-APPOINTMENT (OUTPATIENT)
Age: 67
End: 2022-03-14

## 2022-03-14 ENCOUNTER — APPOINTMENT (OUTPATIENT)
Dept: OPHTHALMOLOGY | Facility: CLINIC | Age: 67
End: 2022-03-14
Payer: MEDICARE

## 2022-03-14 PROCEDURE — 66762 REVISION OF IRIS: CPT | Mod: RT

## 2022-03-23 ENCOUNTER — TRANSCRIPTION ENCOUNTER (OUTPATIENT)
Age: 67
End: 2022-03-23

## 2022-03-23 LAB
ESTRADIOL SERPL-MCNC: 23 PG/ML
FSH SERPL-MCNC: 5.7 IU/L
LH SERPL-ACNC: 6.4 IU/L
PROLACTIN SERPL-MCNC: 6.1 NG/ML
PSA SERPL-MCNC: 0.56 NG/ML

## 2022-03-28 ENCOUNTER — NON-APPOINTMENT (OUTPATIENT)
Age: 67
End: 2022-03-28

## 2022-03-28 ENCOUNTER — APPOINTMENT (OUTPATIENT)
Dept: INTERNAL MEDICINE | Facility: CLINIC | Age: 67
End: 2022-03-28
Payer: MEDICARE

## 2022-03-28 ENCOUNTER — APPOINTMENT (OUTPATIENT)
Dept: OPHTHALMOLOGY | Facility: CLINIC | Age: 67
End: 2022-03-28
Payer: MEDICARE

## 2022-03-28 VITALS
HEART RATE: 80 BPM | DIASTOLIC BLOOD PRESSURE: 82 MMHG | HEIGHT: 70 IN | WEIGHT: 240 LBS | SYSTOLIC BLOOD PRESSURE: 152 MMHG | TEMPERATURE: 98.4 F | OXYGEN SATURATION: 98 % | BODY MASS INDEX: 34.36 KG/M2

## 2022-03-28 DIAGNOSIS — R76.8 OTHER SPECIFIED ABNORMAL IMMUNOLOGICAL FINDINGS IN SERUM: ICD-10-CM

## 2022-03-28 DIAGNOSIS — I71.2 THORACIC AORTIC ANEURYSM, W/OUT RUPTURE: ICD-10-CM

## 2022-03-28 DIAGNOSIS — Z00.00 ENCOUNTER FOR GENERAL ADULT MEDICAL EXAMINATION W/OUT ABNORMAL FINDINGS: ICD-10-CM

## 2022-03-28 PROCEDURE — 66762 REVISION OF IRIS: CPT | Mod: LT,78

## 2022-03-28 PROCEDURE — G0439: CPT

## 2022-03-28 PROCEDURE — 99213 OFFICE O/P EST LOW 20 MIN: CPT | Mod: 25

## 2022-03-29 ENCOUNTER — TRANSCRIPTION ENCOUNTER (OUTPATIENT)
Age: 67
End: 2022-03-29

## 2022-04-04 ENCOUNTER — RX RENEWAL (OUTPATIENT)
Age: 67
End: 2022-04-04

## 2022-04-07 ENCOUNTER — APPOINTMENT (OUTPATIENT)
Dept: UROLOGY | Facility: CLINIC | Age: 67
End: 2022-04-07
Payer: MEDICARE

## 2022-04-07 ENCOUNTER — TRANSCRIPTION ENCOUNTER (OUTPATIENT)
Age: 67
End: 2022-04-07

## 2022-04-07 VITALS — HEART RATE: 76 BPM | DIASTOLIC BLOOD PRESSURE: 73 MMHG | TEMPERATURE: 97.3 F | SYSTOLIC BLOOD PRESSURE: 135 MMHG

## 2022-04-07 DIAGNOSIS — R68.82 DECREASED LIBIDO: ICD-10-CM

## 2022-04-07 LAB
BILIRUB UR QL STRIP: NEGATIVE
CLARITY UR: CLEAR
COLLECTION METHOD: NORMAL
GLUCOSE UR-MCNC: NEGATIVE
HCG UR QL: 1 EU/DL
HGB UR QL STRIP.AUTO: NEGATIVE
KETONES UR-MCNC: NEGATIVE
LEUKOCYTE ESTERASE UR QL STRIP: NEGATIVE
NITRITE UR QL STRIP: NEGATIVE
PH UR STRIP: 6
PROT UR STRIP-MCNC: NEGATIVE
SP GR UR STRIP: 1.02

## 2022-04-07 PROCEDURE — 81003 URINALYSIS AUTO W/O SCOPE: CPT | Mod: QW

## 2022-04-07 PROCEDURE — 99214 OFFICE O/P EST MOD 30 MIN: CPT

## 2022-04-07 NOTE — PHYSICAL EXAM
[General Appearance - Well Developed] : well developed [General Appearance - Well Nourished] : well nourished [Normal Appearance] : normal appearance [Well Groomed] : well groomed [General Appearance - In No Acute Distress] : no acute distress [Abdomen Soft] : soft [Abdomen Hernia] : no hernia was discovered [Costovertebral Angle Tenderness] : no ~M costovertebral angle tenderness [FreeTextEntry1] : Tender to palpation in the RLQ and right inguinal area

## 2022-04-07 NOTE — ASSESSMENT
[FreeTextEntry1] : We discussed the patient's continued discomfort in the right inguinal area and right lower quadrant in view of his prior laparoscopic bilateral herniorrhaphy.  I recommended that we assess this with a CT scan of the abdomen and pelvis  with oral contrast only.  He is amenable with this suggestion after we discussed the indications risks alternatives and chance for success and the CT scan is ordered today.\par \par As for the evaluation thus far for the patient's ED, I again discussed the use of PDE 5 on medication and recommended he try sildenafil citrate 50 to 100 mg per dose.  Because of his cardiac issues and his glaucoma, I recommended that we discussed this with his cardiologist Toni and his ophthalmologist Dr. Matias santana.  To that end, I sent messages via DivX to each of the doctors and asked the patient to contact them also.  After we receive confirmation that they have no issues with using that medication then I will order the medicine for him from [facility] as sildenafil 100 mg, 1/2 to 1 tab 1 hour before activity PRN daily x 30 x 3. In my assessment I do not see any problems with use of the medication at this point. Dasia Corrigan MD\par

## 2022-04-07 NOTE — HISTORY OF PRESENT ILLNESS
[FreeTextEntry1] : 65 YO M seen  3/3/2022 as NPT for inguinal hernia. ED by history. He told me that he had a bilateral inguinal hernia repair by laparoscopy with mesh 12/2019. He also had a left lung decortication 2018 for pneumonia which progressed to empyema. He was caring for his invalid mother who passed last year. After that he noted difficulty achieving and maintaining erection ( for 6 - 8 months, may have been present longer). He has not tried any treatment yet. He has also noted mild right groin discomfort intermittently over the last 2 - 3 months. No problems with urination, Nocturia x 2 - 3. \par We discussed possible causes of his ED with low libido and I recommended that we check a full hormonal panel in an AM blood draw. We will also check the PSA at that time. We also discussed treatment of his ED with PDE5i medication and he will consider this option. We reviewed the indications, risks, alternatives and chances for success with this therapy.We will readdress this after we have the results of the blood work. Before prescribing any PDE5i treatment, we will clear this with his Cardiologist, Souleymane Bartholomew. \par PSA 0.56\par LH 6.4\par PRO 6.1\par E 2 23\par FSH 5.7\par TT not done \par \par Patient seen TODAY 4/7/2022 to review above. He continues to have sensitivity in the RLQ and right inguinal area. ED continues to be an issue for him.\par \par Patient on medication for  HTN, closed angle glaucoma, cholesterol. NKMA, allergic to seafood.\par  The patient denies fevers, chills, nausea and or vomiting and no unexplained weight loss. \par All pertinent parts of the patient PFSH (past medical, family and social histories), laboratory, radiological studies and physician notes were reviewed prior to starting the face to face portion of the  visit. Questionnaire results were discussed with patient.\par \par

## 2022-04-08 LAB — TESTOST SERPL-MCNC: 384 NG/DL

## 2022-04-25 ENCOUNTER — TRANSCRIPTION ENCOUNTER (OUTPATIENT)
Age: 67
End: 2022-04-25

## 2022-05-05 ENCOUNTER — OUTPATIENT (OUTPATIENT)
Dept: OUTPATIENT SERVICES | Facility: HOSPITAL | Age: 67
LOS: 1 days | End: 2022-05-05
Payer: MEDICARE

## 2022-05-05 ENCOUNTER — APPOINTMENT (OUTPATIENT)
Dept: CT IMAGING | Facility: HOSPITAL | Age: 67
End: 2022-05-05
Payer: MEDICARE

## 2022-05-05 ENCOUNTER — RESULT REVIEW (OUTPATIENT)
Age: 67
End: 2022-05-05

## 2022-05-05 ENCOUNTER — APPOINTMENT (OUTPATIENT)
Dept: ORTHOPEDIC SURGERY | Facility: CLINIC | Age: 67
End: 2022-05-05
Payer: MEDICARE

## 2022-05-05 DIAGNOSIS — Z93.8 OTHER ARTIFICIAL OPENING STATUS: Chronic | ICD-10-CM

## 2022-05-05 DIAGNOSIS — M25.50 PAIN IN UNSPECIFIED JOINT: ICD-10-CM

## 2022-05-05 PROCEDURE — 74176 CT ABD & PELVIS W/O CONTRAST: CPT | Mod: 26,ME

## 2022-05-05 PROCEDURE — 73590 X-RAY EXAM OF LOWER LEG: CPT | Mod: RT

## 2022-05-05 PROCEDURE — 74176 CT ABD & PELVIS W/O CONTRAST: CPT | Mod: ME

## 2022-05-05 PROCEDURE — 73564 X-RAY EXAM KNEE 4 OR MORE: CPT | Mod: RT

## 2022-05-05 PROCEDURE — 99203 OFFICE O/P NEW LOW 30 MIN: CPT

## 2022-05-05 PROCEDURE — G1004: CPT

## 2022-05-10 ENCOUNTER — APPOINTMENT (OUTPATIENT)
Dept: PHYSICAL MEDICINE AND REHAB | Facility: CLINIC | Age: 67
End: 2022-05-10
Payer: MEDICARE

## 2022-05-10 VITALS — HEIGHT: 70 IN | BODY MASS INDEX: 33.07 KG/M2 | WEIGHT: 231 LBS

## 2022-05-10 DIAGNOSIS — M47.812 SPONDYLOSIS W/OUT MYELOPATHY OR RADICULOPATHY, CERVICAL REGION: ICD-10-CM

## 2022-05-10 PROCEDURE — 99213 OFFICE O/P EST LOW 20 MIN: CPT

## 2022-05-10 NOTE — HISTORY OF PRESENT ILLNESS
[FreeTextEntry1] : Mr. TOM PATEL is a very pleasant 66 year male who comes in for follow up of neck pain. He has been doing HEP since his previous visit which has been helping. His symptoms are improved though there is still some residual pain located primarily in the neck, non-radiating, intermittent and described as achy, limited range of motion and stiff. The pain is rated as 3/10 and ranges from 2-6/10. The patient's symptoms are aggravated when waking up, prolonged walking and alleviated by heat pads, Voltaren, Robaxin and Acetaminophen. The patient denies any night pain, numbness/tingling, weakness, or bowel/bladder dysfunction. The patient has no other complaints at this time.\par

## 2022-05-10 NOTE — ASSESSMENT
[FreeTextEntry1] : Impression:\par 1. Cervical Spondylosis/DISH\par 2. C5/6 + C6/7 Disc Protrusion\par \par Plan: The history and physical examination were reviewed along with imaging. The patient has shown improvement with home therapy and wishes to revisit formal PT. The imaging and diagnosis were reviewed with the patient along with treatment options. We will continue with PT/HE program and re-emphasized the importance of the HEP. He may continue to use Robaxin as needed for symptomatic relief. We discussed the medication in detail with regard to appropriate use, adverse effects, and expected outcome. The patient will follow up in 6-8 weeks. The patient expressed verbal understanding and is in agreement with the plan of care. All of the patient's questions and concerns were addressed during today's visit.

## 2022-05-13 ENCOUNTER — TRANSCRIPTION ENCOUNTER (OUTPATIENT)
Age: 67
End: 2022-05-13

## 2022-05-17 ENCOUNTER — TRANSCRIPTION ENCOUNTER (OUTPATIENT)
Age: 67
End: 2022-05-17

## 2022-05-25 ENCOUNTER — APPOINTMENT (OUTPATIENT)
Dept: HEART AND VASCULAR | Facility: CLINIC | Age: 67
End: 2022-05-25
Payer: MEDICARE

## 2022-05-25 VITALS
WEIGHT: 231 LBS | HEIGHT: 70 IN | TEMPERATURE: 97.21 F | HEART RATE: 83 BPM | OXYGEN SATURATION: 96 % | RESPIRATION RATE: 12 BRPM | BODY MASS INDEX: 33.07 KG/M2

## 2022-05-25 PROCEDURE — 93015 CV STRESS TEST SUPVJ I&R: CPT

## 2022-05-25 PROCEDURE — 99214 OFFICE O/P EST MOD 30 MIN: CPT | Mod: 25

## 2022-05-25 NOTE — DISCUSSION/SUMMARY
[FreeTextEntry1] : sob- abnormal est, for nuclear stress and echo eval\par results and further testing discussed

## 2022-05-25 NOTE — PHYSICAL EXAM
[Well Developed] : well developed [Well Nourished] : well nourished [No Acute Distress] : no acute distress [Normal Conjunctiva] : normal conjunctiva [Normal Venous Pressure] : normal venous pressure [No Carotid Bruit] : no carotid bruit [Normal S1, S2] : normal S1, S2 [No Rub] : no rub [No Gallop] : no gallop [Murmur] : murmur [Clear Lung Fields] : clear lung fields [Good Air Entry] : good air entry [No Respiratory Distress] : no respiratory distress  [Soft] : abdomen soft [Non Tender] : non-tender [No Masses/organomegaly] : no masses/organomegaly [Normal Bowel Sounds] : normal bowel sounds [Normal Gait] : normal gait [No Edema] : no edema [No Cyanosis] : no cyanosis [No Clubbing] : no clubbing [No Rash] : no rash [Moves all extremities] : moves all extremities [No Focal Deficits] : no focal deficits [Normal Speech] : normal speech [Alert and Oriented] : alert and oriented [Normal memory] : normal memory [de-identified] : 2/6

## 2022-05-26 ENCOUNTER — TRANSCRIPTION ENCOUNTER (OUTPATIENT)
Age: 67
End: 2022-05-26

## 2022-05-26 ENCOUNTER — NON-APPOINTMENT (OUTPATIENT)
Age: 67
End: 2022-05-26

## 2022-05-27 ENCOUNTER — TRANSCRIPTION ENCOUNTER (OUTPATIENT)
Age: 67
End: 2022-05-27

## 2022-05-31 ENCOUNTER — RX RENEWAL (OUTPATIENT)
Age: 67
End: 2022-05-31

## 2022-06-23 ENCOUNTER — APPOINTMENT (OUTPATIENT)
Dept: HEART AND VASCULAR | Facility: CLINIC | Age: 67
End: 2022-06-23
Payer: MEDICARE

## 2022-06-23 DIAGNOSIS — R94.39 ABNORMAL RESULT OF OTHER CARDIOVASCULAR FUNCTION STUDY: ICD-10-CM

## 2022-06-23 PROCEDURE — A9500: CPT

## 2022-06-23 PROCEDURE — 78451 HT MUSCLE IMAGE SPECT SING: CPT

## 2022-06-23 PROCEDURE — 93306 TTE W/DOPPLER COMPLETE: CPT

## 2022-06-23 PROCEDURE — 99214 OFFICE O/P EST MOD 30 MIN: CPT | Mod: 25

## 2022-06-23 PROCEDURE — 93015 CV STRESS TEST SUPVJ I&R: CPT

## 2022-06-23 RX ORDER — DORZOLAMIDE HYDROCHLORIDE 20 MG/ML
2 SOLUTION OPHTHALMIC
Qty: 30 | Refills: 0 | Status: COMPLETED | COMMUNITY
Start: 2022-04-01

## 2022-06-23 RX ORDER — PREDNISOLONE ACETATE 10 MG/ML
1 SUSPENSION/ DROPS OPHTHALMIC
Qty: 15 | Refills: 0 | Status: COMPLETED | COMMUNITY
Start: 2022-05-02

## 2022-06-23 RX ORDER — MECLIZINE HYDROCHLORIDE 25 MG/1
TABLET ORAL
Refills: 0 | Status: DISCONTINUED | COMMUNITY
End: 2022-06-23

## 2022-06-23 RX ORDER — TIZANIDINE 4 MG/1
4 TABLET ORAL
Qty: 30 | Refills: 0 | Status: COMPLETED | COMMUNITY
Start: 2022-03-01

## 2022-06-23 RX ORDER — KETOCONAZOLE 20 MG/G
2 CREAM TOPICAL
Qty: 60 | Refills: 0 | Status: COMPLETED | COMMUNITY
Start: 2022-02-04

## 2022-06-23 RX ORDER — METHOCARBAMOL 750 MG/1
750 TABLET, FILM COATED ORAL
Qty: 30 | Refills: 1 | Status: DISCONTINUED | COMMUNITY
Start: 2021-11-29 | End: 2022-06-23

## 2022-06-23 NOTE — PHYSICAL EXAM
[Well Developed] : well developed [Well Nourished] : well nourished [No Acute Distress] : no acute distress [Normal Conjunctiva] : normal conjunctiva [Normal Venous Pressure] : normal venous pressure [No Carotid Bruit] : no carotid bruit [Normal S1, S2] : normal S1, S2 [No Rub] : no rub [No Gallop] : no gallop [Murmur] : murmur [Clear Lung Fields] : clear lung fields [Good Air Entry] : good air entry [No Respiratory Distress] : no respiratory distress  [Soft] : abdomen soft [Non Tender] : non-tender [No Masses/organomegaly] : no masses/organomegaly [Normal Bowel Sounds] : normal bowel sounds [Normal Gait] : normal gait [No Edema] : no edema [No Cyanosis] : no cyanosis [No Clubbing] : no clubbing [No Rash] : no rash [Moves all extremities] : moves all extremities [No Focal Deficits] : no focal deficits [Normal Speech] : normal speech [Alert and Oriented] : alert and oriented [Normal memory] : normal memory [de-identified] : 2/6

## 2022-06-23 NOTE — DISCUSSION/SUMMARY
[FreeTextEntry1] : sob, abnormal stress ecg- normal perfusion\par echo results discussed\par new AF- increase B blocker, start eliquis, EPS eval\par chadsvasc score at least 2

## 2022-06-24 ENCOUNTER — TRANSCRIPTION ENCOUNTER (OUTPATIENT)
Age: 67
End: 2022-06-24

## 2022-06-29 ENCOUNTER — APPOINTMENT (OUTPATIENT)
Dept: OPHTHALMOLOGY | Facility: CLINIC | Age: 67
End: 2022-06-29

## 2022-06-29 ENCOUNTER — NON-APPOINTMENT (OUTPATIENT)
Age: 67
End: 2022-06-29

## 2022-06-29 PROCEDURE — 92012 INTRM OPH EXAM EST PATIENT: CPT

## 2022-06-29 PROCEDURE — 92083 EXTENDED VISUAL FIELD XM: CPT

## 2022-06-30 ENCOUNTER — TRANSCRIPTION ENCOUNTER (OUTPATIENT)
Age: 67
End: 2022-06-30

## 2022-07-06 ENCOUNTER — APPOINTMENT (OUTPATIENT)
Dept: HEART AND VASCULAR | Facility: CLINIC | Age: 67
End: 2022-07-06

## 2022-07-06 ENCOUNTER — NON-APPOINTMENT (OUTPATIENT)
Age: 67
End: 2022-07-06

## 2022-07-06 VITALS
HEART RATE: 116 BPM | DIASTOLIC BLOOD PRESSURE: 74 MMHG | BODY MASS INDEX: 33.5 KG/M2 | WEIGHT: 234 LBS | SYSTOLIC BLOOD PRESSURE: 122 MMHG | HEIGHT: 70 IN

## 2022-07-06 PROCEDURE — 99205 OFFICE O/P NEW HI 60 MIN: CPT

## 2022-07-06 PROCEDURE — 93000 ELECTROCARDIOGRAM COMPLETE: CPT

## 2022-07-06 NOTE — REVIEW OF SYSTEMS
[SOB] : shortness of breath [Palpitations] : palpitations [Dizziness] : dizziness [Negative] : Heme/Lymph

## 2022-07-07 ENCOUNTER — TRANSCRIPTION ENCOUNTER (OUTPATIENT)
Age: 67
End: 2022-07-07

## 2022-07-08 ENCOUNTER — APPOINTMENT (OUTPATIENT)
Dept: UROLOGY | Facility: CLINIC | Age: 67
End: 2022-07-08

## 2022-07-08 VITALS
DIASTOLIC BLOOD PRESSURE: 72 MMHG | TEMPERATURE: 97.7 F | SYSTOLIC BLOOD PRESSURE: 122 MMHG | OXYGEN SATURATION: 97 % | HEART RATE: 82 BPM

## 2022-07-08 LAB
BILIRUB UR QL STRIP: NORMAL
CLARITY UR: CLEAR
COLLECTION METHOD: NORMAL
GLUCOSE UR-MCNC: NORMAL
HCG UR QL: 0.2 EU/DL
HGB UR QL STRIP.AUTO: NORMAL
KETONES UR-MCNC: NORMAL
LEUKOCYTE ESTERASE UR QL STRIP: NORMAL
NITRITE UR QL STRIP: NORMAL
PH UR STRIP: 5
PROT UR STRIP-MCNC: NORMAL
SP GR UR STRIP: 1.02

## 2022-07-08 PROCEDURE — 99214 OFFICE O/P EST MOD 30 MIN: CPT

## 2022-07-08 PROCEDURE — 81003 URINALYSIS AUTO W/O SCOPE: CPT | Mod: QW

## 2022-07-08 RX ORDER — ASPIRIN 81 MG/1
81 TABLET, COATED ORAL
Qty: 90 | Refills: 3 | Status: DISCONTINUED | COMMUNITY
Start: 2020-07-21 | End: 2022-07-08

## 2022-07-08 NOTE — ASSESSMENT
[FreeTextEntry1] : We discussed the findings of the CT scan which showed a right fat containing inguinal hernia and a left bowel containing inguinal hernia. Patient has had relief from hernia belt, however, we believe it would be best for him to evaluated by a general surgeon. He agrees with this plan. We discussed the signs and symptoms of an emergent situation, which include severe pain, nausea, vomiting, fever. If these occur, he understands he should report directly to ED. \par \par As for his erectile dysfunction, patient has cardiac and opthalmology clearance, so we wishes to try sildenafil. We prescribed sildenafil 100 mg 1/2-1 tab to be taken 1 hr before sexual activity on an empty stomach. It was sent to [facility], we informed him how to use the GoodRx coupon. We discussed goals and side effects of medication. \par \par He will follow up in 3 months for re-evaluation of ED, inguinal hernias. Dasia Corrigan MD\par \par \par \par \par

## 2022-07-08 NOTE — HISTORY OF PRESENT ILLNESS
[FreeTextEntry1] : 65 YO M seen  3/3/2022 as NPT for inguinal hernia. ED by history. He told me that he had a bilateral inguinal hernia repair by laparoscopy with mesh 12/2019. He also had a left lung decortication 2018 for pneumonia which progressed to empyema. He was caring for his invalid mother who passed last year. After that he noted difficulty achieving and maintaining erection ( for 6 - 8 months, may have been present longer). He has not tried any treatment yet. He has also noted mild right groin discomfort intermittently over the last 2 - 3 months. No problems with urination, Nocturia x 2 - 3. \par We discussed possible causes of his ED with low libido and I recommended that we check a full hormonal panel in an AM blood draw. We will also check the PSA at that time. We also discussed treatment of his ED with PDE5i medication and he will consider this option. We reviewed the indications, risks, alternatives and chances for success with this therapy. We will readdress this after we have the results of the blood work. Before prescribing any PDE5i treatment, we will clear this with his Cardiologist, Souleymane Bartholomew. \par PSA 0.56\par LH 6.4\par PRO 6.1\par E 2 23\par FSH 5.7\par TT not done \par \par Patient seen  4/7/2022 to review above. He continues to have sensitivity in the RLQ and right inguinal area. ED continues to be an issue for him.\par We discussed the patient's continued discomfort in the right inguinal area and right lower quadrant in view of his prior laparoscopic bilateral herniorrhaphy. I recommended that we assess this with a CT scan of the abdomen and pelvis with oral contrast only. He is amenable with this suggestion after we discussed the indications risks alternatives and chance for success and the CT scan is ordered today.\par As for the evaluation thus far for the patient's ED, I again discussed the use of PDE 5 on medication and recommended he try sildenafil citrate 50 to 100 mg per dose. Because of his cardiac issues and his glaucoma, I recommended that we discussed this with his cardiologist [place] and his ophthalmologist Dr. Matias santana. To that end, I sent messages via [employer] to each of the doctors and asked the patient to contact them also. After we receive confirmation that they have no issues with using that medication then I will order the medicine for him from [facility] as sildenafil 100 mg, 1/2 to 1 tab 1 hour before activity PRN daily x 30 x 3. In my assessment I do not see any problems with use of the medication at this point. \par \par CT scan 5/6/2022:\par IMPRESSION:\par Small fat-containing right inguinal hernia.\par Small to moderate size left inguinal hernia containing non-obstructed loop of small bowel.\par Enlarged prostate.\par \par Patient seen TODAY 7/8/2022 to discuss findings and treatment. Patient states he is doing well overall. He is using a hernia belt for his right sided discomfort, and his pain has improved greatly. He is hesitant to have another surgery, but is open to an evaluation by general surgeon. He is still experiencing difficulty with erections, and would like to try the sildenafil as he has now gotten cardiac clearance from Dr. Bartholomew and opthalmology clearance from Dr. Salcedo. He denies any issues with his urinary symptoms. \par \par UA: negative\par IPSS: 6\par XOCHITL: 12\par JOSE: 4 \par \par Patient on medication for  HTN, closed angle glaucoma, cholesterol. NKMA, allergic to seafood.\par  The patient denies fevers, chills, nausea and or vomiting and no unexplained weight loss. \par All pertinent parts of the patient PFSH (past medical, family and social histories), laboratory, radiological studies and physician notes were reviewed prior to starting the face to face portion of the  visit. Questionnaire results were discussed with patient.\par \par

## 2022-07-08 NOTE — LETTER BODY
[Dear  ___] : Dear  [unfilled], [Courtesy Letter:] : I had the pleasure of seeing your patient, [unfilled], in my office today. [Please see my note below.] : Please see my note below. [Consult Closing:] : Thank you very much for allowing me to participate in the care of this patient.  If you have any questions, please do not hesitate to contact me. [FreeTextEntry3] : Best Regards, \par \par Dasia Corrigan MD\par

## 2022-07-11 ENCOUNTER — TRANSCRIPTION ENCOUNTER (OUTPATIENT)
Age: 67
End: 2022-07-11

## 2022-07-12 ENCOUNTER — TRANSCRIPTION ENCOUNTER (OUTPATIENT)
Age: 67
End: 2022-07-12

## 2022-07-12 NOTE — HISTORY OF PRESENT ILLNESS
[FreeTextEntry1] : Patient is  67 year male HTN, HLD, history of lung decortication, ascending aortic aneurysm, sub aortic membrane (2018). Patient follows with Dr. Figueroa. Presents for initial evaluation for early persistent atrial fibrillation. \par \par \par After stress test patient reported nausea and shortness of breath, recently diagnosed with AF 6/23/22 at Dr. Bartholomew's office. Patient is maintained on metoprolol. Patients reports shortness of breath, palpitations, lightheadedness, dizziness, [place]. Patient test for CLAIRE inconclusive. Patient feels he is limited in his activities of daily living and exercise. Echo from 6/23/22 shows LVEF 71%, normal LA size. \par \par \par Patient is in AF today. Patient is maintained on eliquis and metoprolol.

## 2022-07-12 NOTE — CARDIOLOGY SUMMARY
[de-identified] : 7/6/22 AF 101bpm [de-identified] : 6/23/22\par 1. Normal right ventricular systolic function. Hyperdynamic left ventricular systolic function. LV ejection fraction is 71 % by Mccarty's rule (monoplane). There is left ventricular hypertrophy. Normal LV diastolic function. \par 2. Mild aortic sclerosis. The aortic valve is trileaflet. The aortic valve leaflets are calcified with diminished opening. There is aortic stenosis (Gmean 20 mmHg; AV Vmax 3.1 m/s; Mild to moderate aortic regurgitation. There is a subaortic membranous stenosis (Gmean 41 mmHg). AV Vmax 3.2 m/s). Mild mitral, pulmonic and tricuspid regurgitation. Right ventricular systolic pressure = 27 mmHg. \par 3. No pericardial effusion. \par 4. No significant change since previous exam performed on 05/12/2021.

## 2022-07-12 NOTE — DISCUSSION/SUMMARY
[FreeTextEntry1] : Patient is  67  year male HTN, HLD, history of lung decortication, ascending aortic aneurysm, sub aortic membrane (2018). Patient follows with Dr. Figueroa. Presents for initial evaluation for early persistent atrial fibrillation.\par \par Patient on AC since 6/24 and metoprolol increased to 50/75mg BID. Patient reports he is now aware of symptoms since diagnosis and reports shortness of breath, lightheadedness, dizziness  and palpitations.\par \par Discussed options for management, including no intervention, medical therapy, ablation.\par him in detail. Discussed risks, benefits and alternatives of ablation procedure. Risks including but not limited to infection, vascular injury, cardiac   perforation, phrenic nerve injury, TE/CVA were among those discussed. Discussed association of CLAIRE with AF, encouraged to repeat sleep study. \par \par We discussed the normal conduction system of the heart and what atrial fibrillation is. We discussed the natural progression of this arrhythmia, the association with stroke and the importance of BID compliance with Eliquis. Treatment approaches for paroxysmal vs persistent atrial fibrillation include observation / rate control, antiarrhythmic medications and an ablation. We discussed what an ablation is in detail including risks, benefits and alternatives.\par \par Discussed risks, benefits and alternatives of ablation procedure. Discussed risks including but no limited to stroke, bleeding, infection, need for major cardiac surgery, phrenic nerve palsy/paralysis, occur in less than 1:1200 patients. I have quoted him with a 70-80% chance of freedom from AF at 1 year, patient understands given his stage of AF repeat procedure is needed in 30% of patients to maintain NSR. \par \par Discussed current clinical data suggests early intervention is indicated in patients with paroxysmal atrial fibrillation before progression of atrial fibrillation to persistent atrial fibrillation. Progression of disease is associated. For now patient would like to proceed with starting anti-arrhythmic- flecainide and schedule cardioversion for next week. Once patient evaluated how he feels in NSR will discuss proceeding with ablation. Patient was given instructions that AC is not to be stopped after DCCV he will plan dental work accordingly.

## 2022-07-12 NOTE — PHYSICAL EXAM
[Well Developed] : well developed [Well Nourished] : well nourished [No Acute Distress] : no acute distress [Normal Conjunctiva] : normal conjunctiva [Normal Venous Pressure] : normal venous pressure [No Carotid Bruit] : no carotid bruit [Normal S1, S2] : normal S1, S2 [No Rub] : no rub [No Gallop] : no gallop [Murmur] : murmur [Clear Lung Fields] : clear lung fields [Good Air Entry] : good air entry [No Respiratory Distress] : no respiratory distress  [Soft] : abdomen soft [Non Tender] : non-tender [No Masses/organomegaly] : no masses/organomegaly [Normal Bowel Sounds] : normal bowel sounds [Normal Gait] : normal gait [No Edema] : no edema [No Cyanosis] : no cyanosis [No Clubbing] : no clubbing [No Rash] : no rash [Moves all extremities] : moves all extremities [No Focal Deficits] : no focal deficits [Normal Speech] : normal speech [Alert and Oriented] : alert and oriented [Normal memory] : normal memory [No Murmur] : no murmur [No Varicosities] : no varicosities [No Skin Lesions] : no skin lesions [de-identified] : 2/6

## 2022-07-19 ENCOUNTER — APPOINTMENT (OUTPATIENT)
Dept: INTERNAL MEDICINE | Facility: CLINIC | Age: 67
End: 2022-07-19

## 2022-07-19 PROCEDURE — 99443: CPT | Mod: 95

## 2022-07-20 ENCOUNTER — TRANSCRIPTION ENCOUNTER (OUTPATIENT)
Age: 67
End: 2022-07-20

## 2022-07-23 ENCOUNTER — OUTPATIENT (OUTPATIENT)
Dept: OUTPATIENT SERVICES | Facility: HOSPITAL | Age: 67
LOS: 1 days | End: 2022-07-23

## 2022-07-23 ENCOUNTER — APPOINTMENT (OUTPATIENT)
Dept: ULTRASOUND IMAGING | Facility: CLINIC | Age: 67
End: 2022-07-23

## 2022-07-23 DIAGNOSIS — Z93.8 OTHER ARTIFICIAL OPENING STATUS: Chronic | ICD-10-CM

## 2022-07-23 PROCEDURE — 93970 EXTREMITY STUDY: CPT | Mod: 26

## 2022-07-25 ENCOUNTER — TRANSCRIPTION ENCOUNTER (OUTPATIENT)
Age: 67
End: 2022-07-25

## 2022-08-09 ENCOUNTER — NON-APPOINTMENT (OUTPATIENT)
Age: 67
End: 2022-08-09

## 2022-08-09 ENCOUNTER — TRANSCRIPTION ENCOUNTER (OUTPATIENT)
Age: 67
End: 2022-08-09

## 2022-08-10 ENCOUNTER — TRANSCRIPTION ENCOUNTER (OUTPATIENT)
Age: 67
End: 2022-08-10

## 2022-08-12 ENCOUNTER — TRANSCRIPTION ENCOUNTER (OUTPATIENT)
Age: 67
End: 2022-08-12

## 2022-08-18 ENCOUNTER — TRANSCRIPTION ENCOUNTER (OUTPATIENT)
Age: 67
End: 2022-08-18

## 2022-08-18 LAB
COVID-19 SPIKE DOMAIN ANTIBODY INTERPRETATION: POSITIVE
SARS-COV-2 AB SERPL IA-ACNC: >250 U/ML

## 2022-08-22 ENCOUNTER — TRANSCRIPTION ENCOUNTER (OUTPATIENT)
Age: 67
End: 2022-08-22

## 2022-08-22 LAB
ALBUMIN SERPL ELPH-MCNC: 4.5 G/DL
ALP BLD-CCNC: 73 U/L
ALT SERPL-CCNC: 24 U/L
ANION GAP SERPL CALC-SCNC: 15 MMOL/L
AST SERPL-CCNC: 18 U/L
BASOPHILS # BLD AUTO: 0.04 K/UL
BASOPHILS NFR BLD AUTO: 0.5 %
BILIRUB SERPL-MCNC: 1.4 MG/DL
BUN SERPL-MCNC: 18 MG/DL
CALCIUM SERPL-MCNC: 9.1 MG/DL
CHLORIDE SERPL-SCNC: 104 MMOL/L
CHOLEST SERPL-MCNC: 97 MG/DL
CO2 SERPL-SCNC: 21 MMOL/L
CREAT SERPL-MCNC: 1.03 MG/DL
EGFR: 79 ML/MIN/1.73M2
EOSINOPHIL # BLD AUTO: 0.18 K/UL
EOSINOPHIL NFR BLD AUTO: 2.4 %
ESTIMATED AVERAGE GLUCOSE: 120 MG/DL
GLUCOSE SERPL-MCNC: 90 MG/DL
HBA1C MFR BLD HPLC: 5.8 %
HCT VFR BLD CALC: 48.1 %
HDLC SERPL-MCNC: 32 MG/DL
HGB BLD-MCNC: 16 G/DL
IMM GRANULOCYTES NFR BLD AUTO: 0.3 %
LDLC SERPL CALC-MCNC: 48 MG/DL
LYMPHOCYTES # BLD AUTO: 1.95 K/UL
LYMPHOCYTES NFR BLD AUTO: 26.1 %
MAN DIFF?: NORMAL
MCHC RBC-ENTMCNC: 30.8 PG
MCHC RBC-ENTMCNC: 33.3 GM/DL
MCV RBC AUTO: 92.5 FL
MONOCYTES # BLD AUTO: 0.45 K/UL
MONOCYTES NFR BLD AUTO: 6 %
NEUTROPHILS # BLD AUTO: 4.83 K/UL
NEUTROPHILS NFR BLD AUTO: 64.7 %
NONHDLC SERPL-MCNC: 65 MG/DL
PLATELET # BLD AUTO: 228 K/UL
POTASSIUM SERPL-SCNC: 4.3 MMOL/L
PROT SERPL-MCNC: 6.7 G/DL
RBC # BLD: 5.2 M/UL
RBC # FLD: 12.7 %
SODIUM SERPL-SCNC: 140 MMOL/L
TRIGL SERPL-MCNC: 83 MG/DL
URATE SERPL-MCNC: 7.1 MG/DL
WBC # FLD AUTO: 7.47 K/UL

## 2022-08-24 ENCOUNTER — TRANSCRIPTION ENCOUNTER (OUTPATIENT)
Age: 67
End: 2022-08-24

## 2022-08-24 LAB — SARS-COV-2 N GENE NPH QL NAA+PROBE: NOT DETECTED

## 2022-08-26 ENCOUNTER — TRANSCRIPTION ENCOUNTER (OUTPATIENT)
Age: 67
End: 2022-08-26

## 2022-08-26 LAB
APPEARANCE: ABNORMAL
BACTERIA: NEGATIVE
BILIRUBIN URINE: NEGATIVE
BLOOD URINE: NEGATIVE
CALCIUM OXALATE CRYSTALS: ABNORMAL
COLOR: YELLOW
GLUCOSE QUALITATIVE U: NEGATIVE
KETONES URINE: NEGATIVE
LEUKOCYTE ESTERASE URINE: NEGATIVE
MICROSCOPIC-UA: NORMAL
NITRITE URINE: NEGATIVE
PH URINE: 6
PROTEIN URINE: NORMAL
RED BLOOD CELLS URINE: 1 /HPF
SPECIFIC GRAVITY URINE: >=1.03
SQUAMOUS EPITHELIAL CELLS: 3 /HPF
URINE COMMENTS: NORMAL
UROBILINOGEN URINE: NORMAL
WHITE BLOOD CELLS URINE: 2 /HPF

## 2022-09-06 ENCOUNTER — APPOINTMENT (OUTPATIENT)
Dept: INTERNAL MEDICINE | Facility: CLINIC | Age: 67
End: 2022-09-06

## 2022-09-06 VITALS
WEIGHT: 237 LBS | OXYGEN SATURATION: 96 % | BODY MASS INDEX: 33.93 KG/M2 | HEIGHT: 70 IN | DIASTOLIC BLOOD PRESSURE: 75 MMHG | SYSTOLIC BLOOD PRESSURE: 118 MMHG | HEART RATE: 84 BPM | TEMPERATURE: 96.7 F

## 2022-09-06 PROCEDURE — 99215 OFFICE O/P EST HI 40 MIN: CPT

## 2022-09-16 ENCOUNTER — TRANSCRIPTION ENCOUNTER (OUTPATIENT)
Age: 67
End: 2022-09-16

## 2022-09-21 ENCOUNTER — TRANSCRIPTION ENCOUNTER (OUTPATIENT)
Age: 67
End: 2022-09-21

## 2022-09-23 ENCOUNTER — TRANSCRIPTION ENCOUNTER (OUTPATIENT)
Age: 67
End: 2022-09-23

## 2022-09-26 ENCOUNTER — TRANSCRIPTION ENCOUNTER (OUTPATIENT)
Age: 67
End: 2022-09-26

## 2022-09-26 ENCOUNTER — RX RENEWAL (OUTPATIENT)
Age: 67
End: 2022-09-26

## 2022-09-27 ENCOUNTER — TRANSCRIPTION ENCOUNTER (OUTPATIENT)
Age: 67
End: 2022-09-27

## 2022-09-29 ENCOUNTER — APPOINTMENT (OUTPATIENT)
Dept: OPHTHALMOLOGY | Facility: CLINIC | Age: 67
End: 2022-09-29

## 2022-10-03 ENCOUNTER — APPOINTMENT (OUTPATIENT)
Dept: HEART AND VASCULAR | Facility: CLINIC | Age: 67
End: 2022-10-03

## 2022-10-03 ENCOUNTER — APPOINTMENT (OUTPATIENT)
Dept: INTERNAL MEDICINE | Facility: CLINIC | Age: 67
End: 2022-10-03

## 2022-10-03 VITALS
SYSTOLIC BLOOD PRESSURE: 135 MMHG | BODY MASS INDEX: 33.93 KG/M2 | HEIGHT: 70 IN | OXYGEN SATURATION: 96 % | TEMPERATURE: 97.9 F | HEART RATE: 83 BPM | DIASTOLIC BLOOD PRESSURE: 83 MMHG | WEIGHT: 237 LBS

## 2022-10-03 VITALS
TEMPERATURE: 98.6 F | RESPIRATION RATE: 12 BRPM | OXYGEN SATURATION: 97 % | WEIGHT: 237 LBS | HEART RATE: 80 BPM | DIASTOLIC BLOOD PRESSURE: 80 MMHG | SYSTOLIC BLOOD PRESSURE: 130 MMHG | HEIGHT: 70 IN | BODY MASS INDEX: 33.93 KG/M2

## 2022-10-03 DIAGNOSIS — Z23 ENCOUNTER FOR IMMUNIZATION: ICD-10-CM

## 2022-10-03 DIAGNOSIS — R60.0 LOCALIZED EDEMA: ICD-10-CM

## 2022-10-03 PROCEDURE — 99214 OFFICE O/P EST MOD 30 MIN: CPT

## 2022-10-03 NOTE — DISCUSSION/SUMMARY
[FreeTextEntry1] : stable exam\par stable for dental work, SBE prophalaxis/ continue AC\par Afib stable\par AS, sub aortic membrane- asymptomatic, stable\par Lipids stable on statin

## 2022-10-03 NOTE — PHYSICAL EXAM
[Well Developed] : well developed [Well Nourished] : well nourished [No Acute Distress] : no acute distress [Normal Conjunctiva] : normal conjunctiva [Normal Venous Pressure] : normal venous pressure [No Carotid Bruit] : no carotid bruit [Normal S1, S2] : normal S1, S2 [No Rub] : no rub [No Gallop] : no gallop [Murmur] : murmur [Clear Lung Fields] : clear lung fields [Good Air Entry] : good air entry [No Respiratory Distress] : no respiratory distress  [Soft] : abdomen soft [Non Tender] : non-tender [No Masses/organomegaly] : no masses/organomegaly [Normal Bowel Sounds] : normal bowel sounds [Normal Gait] : normal gait [No Edema] : no edema [No Cyanosis] : no cyanosis [No Clubbing] : no clubbing [No Rash] : no rash [Moves all extremities] : moves all extremities [No Focal Deficits] : no focal deficits [Normal Speech] : normal speech [Alert and Oriented] : alert and oriented [Normal memory] : normal memory [de-identified] : 2/6

## 2022-10-04 ENCOUNTER — TRANSCRIPTION ENCOUNTER (OUTPATIENT)
Age: 67
End: 2022-10-04

## 2022-10-05 ENCOUNTER — TRANSCRIPTION ENCOUNTER (OUTPATIENT)
Age: 67
End: 2022-10-05

## 2022-10-08 NOTE — END OF VISIT
Problem: Discharge Planning  Goal: Discharge to home or other facility with appropriate resources  Outcome: Progressing     Problem: Pain  Goal: Verbalizes/displays adequate comfort level or baseline comfort level  Outcome: Progressing     Problem: Safety - Adult  Goal: Free from fall injury  Outcome: Progressing     Problem: ABCDS Injury Assessment  Goal: Absence of physical injury  Outcome: Progressing [Time Spent: ___ minutes] : I have spent [unfilled] minutes of time on the encounter. [>50% of the face to face encounter time was spent on counseling and/or coordination of care for ___] : Greater than 50% of the face to face encounter time was spent on counseling and/or coordination of care for [unfilled]

## 2022-10-12 ENCOUNTER — TRANSCRIPTION ENCOUNTER (OUTPATIENT)
Age: 67
End: 2022-10-12

## 2022-10-20 ENCOUNTER — APPOINTMENT (OUTPATIENT)
Dept: HEART AND VASCULAR | Facility: CLINIC | Age: 67
End: 2022-10-20

## 2022-10-28 ENCOUNTER — TRANSCRIPTION ENCOUNTER (OUTPATIENT)
Age: 67
End: 2022-10-28

## 2022-10-31 ENCOUNTER — TRANSCRIPTION ENCOUNTER (OUTPATIENT)
Age: 67
End: 2022-10-31

## 2022-11-01 ENCOUNTER — TRANSCRIPTION ENCOUNTER (OUTPATIENT)
Age: 67
End: 2022-11-01

## 2022-11-03 ENCOUNTER — TRANSCRIPTION ENCOUNTER (OUTPATIENT)
Age: 67
End: 2022-11-03

## 2022-11-03 LAB
ALBUMIN SERPL ELPH-MCNC: 4.3 G/DL
ALP BLD-CCNC: 73 U/L
ALT SERPL-CCNC: 36 U/L
ANION GAP SERPL CALC-SCNC: 13 MMOL/L
APPEARANCE: CLEAR
AST SERPL-CCNC: 30 U/L
BASOPHILS # BLD AUTO: 0.03 K/UL
BASOPHILS NFR BLD AUTO: 0.3 %
BILIRUB SERPL-MCNC: 1.3 MG/DL
BILIRUBIN URINE: NEGATIVE
BLOOD URINE: NEGATIVE
BUN SERPL-MCNC: 18 MG/DL
CALCIUM SERPL-MCNC: 8.9 MG/DL
CHLORIDE SERPL-SCNC: 102 MMOL/L
CO2 SERPL-SCNC: 24 MMOL/L
COLOR: YELLOW
CREAT SERPL-MCNC: 1 MG/DL
EGFR: 82 ML/MIN/1.73M2
EOSINOPHIL # BLD AUTO: 0.26 K/UL
EOSINOPHIL NFR BLD AUTO: 3 %
ESTIMATED AVERAGE GLUCOSE: 120 MG/DL
GLUCOSE QUALITATIVE U: NEGATIVE
GLUCOSE SERPL-MCNC: 102 MG/DL
HBA1C MFR BLD HPLC: 5.8 %
HCT VFR BLD CALC: 45.3 %
HGB BLD-MCNC: 15.2 G/DL
IMM GRANULOCYTES NFR BLD AUTO: 0.1 %
KETONES URINE: NEGATIVE
LEUKOCYTE ESTERASE URINE: NEGATIVE
LYMPHOCYTES # BLD AUTO: 2.1 K/UL
LYMPHOCYTES NFR BLD AUTO: 24 %
MAN DIFF?: NORMAL
MCHC RBC-ENTMCNC: 31.1 PG
MCHC RBC-ENTMCNC: 33.6 GM/DL
MCV RBC AUTO: 92.6 FL
MONOCYTES # BLD AUTO: 0.46 K/UL
MONOCYTES NFR BLD AUTO: 5.3 %
NEUTROPHILS # BLD AUTO: 5.89 K/UL
NEUTROPHILS NFR BLD AUTO: 67.3 %
NITRITE URINE: NEGATIVE
PH URINE: 6
PLATELET # BLD AUTO: 233 K/UL
POTASSIUM SERPL-SCNC: 4.2 MMOL/L
PROT SERPL-MCNC: 6.6 G/DL
PROTEIN URINE: NORMAL
RBC # BLD: 4.89 M/UL
RBC # FLD: 12.5 %
SARS-COV-2 N GENE NPH QL NAA+PROBE: NOT DETECTED
SODIUM SERPL-SCNC: 139 MMOL/L
SPECIFIC GRAVITY URINE: 1.02
UROBILINOGEN URINE: NORMAL
WBC # FLD AUTO: 8.75 K/UL

## 2022-11-08 ENCOUNTER — TRANSCRIPTION ENCOUNTER (OUTPATIENT)
Age: 67
End: 2022-11-08

## 2022-11-08 DIAGNOSIS — R06.2 WHEEZING: ICD-10-CM

## 2022-11-09 ENCOUNTER — APPOINTMENT (OUTPATIENT)
Dept: OPHTHALMOLOGY | Facility: CLINIC | Age: 67
End: 2022-11-09

## 2022-11-09 ENCOUNTER — APPOINTMENT (OUTPATIENT)
Dept: UROLOGY | Facility: CLINIC | Age: 67
End: 2022-11-09

## 2022-11-09 ENCOUNTER — OUTPATIENT (OUTPATIENT)
Dept: OUTPATIENT SERVICES | Facility: HOSPITAL | Age: 67
LOS: 1 days | End: 2022-11-09
Payer: MEDICARE

## 2022-11-09 ENCOUNTER — NON-APPOINTMENT (OUTPATIENT)
Age: 67
End: 2022-11-09

## 2022-11-09 VITALS
TEMPERATURE: 97 F | SYSTOLIC BLOOD PRESSURE: 155 MMHG | OXYGEN SATURATION: 96 % | DIASTOLIC BLOOD PRESSURE: 90 MMHG | HEART RATE: 91 BPM

## 2022-11-09 DIAGNOSIS — Z93.8 OTHER ARTIFICIAL OPENING STATUS: Chronic | ICD-10-CM

## 2022-11-09 LAB
BILIRUB UR QL STRIP: NORMAL
CLARITY UR: CLEAR
COLLECTION METHOD: NORMAL
GLUCOSE UR-MCNC: NORMAL
HCG UR QL: 0.2 EU/DL
HGB UR QL STRIP.AUTO: NORMAL
KETONES UR-MCNC: NORMAL
LEUKOCYTE ESTERASE UR QL STRIP: NORMAL
NITRITE UR QL STRIP: NORMAL
PH UR STRIP: 5.5
PROT UR STRIP-MCNC: NORMAL
SP GR UR STRIP: 1.01

## 2022-11-09 PROCEDURE — 71046 X-RAY EXAM CHEST 2 VIEWS: CPT

## 2022-11-09 PROCEDURE — 99213 OFFICE O/P EST LOW 20 MIN: CPT

## 2022-11-09 PROCEDURE — 92014 COMPRE OPH EXAM EST PT 1/>: CPT

## 2022-11-09 PROCEDURE — 71046 X-RAY EXAM CHEST 2 VIEWS: CPT | Mod: 26

## 2022-11-09 PROCEDURE — 76513 OPH US DX ANT SGM US UNI/BI: CPT | Mod: 50

## 2022-11-09 PROCEDURE — 92083 EXTENDED VISUAL FIELD XM: CPT

## 2022-11-09 PROCEDURE — 92250 FUNDUS PHOTOGRAPHY W/I&R: CPT

## 2022-11-09 NOTE — LETTER BODY
[Dear  ___] : Dear  [unfilled], [Courtesy Letter:] : I had the pleasure of seeing your patient, [unfilled], in my office today. [Please see my note below.] : Please see my note below. [Consult Closing:] : Thank you very much for allowing me to participate in the care of this patient.  If you have any questions, please do not hesitate to contact me. [FreeTextEntry3] : Best Regards, \par \par Dasia oCrrigan MD\par

## 2022-11-09 NOTE — HISTORY OF PRESENT ILLNESS
[FreeTextEntry1] : 68 YO M seen  3/3/2022 as NPT for inguinal hernia. ED by history. He told me that he had a bilateral inguinal hernia repair by laparoscopy with mesh 12/2019. He also had a left lung decortication 2018 for pneumonia which progressed to empyema. He was caring for his invalid mother who passed last year. After that he noted difficulty achieving and maintaining erection ( for 6 - 8 months, may have been present longer). He has not tried any treatment yet. He has also noted mild right groin discomfort intermittently over the last 2 - 3 months. No problems with urination, Nocturia x 2 - 3. \par We discussed possible causes of his ED with low libido and I recommended that we check a full hormonal panel in an AM blood draw. We will also check the PSA at that time. We also discussed treatment of his ED with PDE5i medication and he will consider this option. We reviewed the indications, risks, alternatives and chances for success with this therapy. We will readdress this after we have the results of the blood work. Before prescribing any PDE5i treatment, we will clear this with his Cardiologist, Souleymane Bartholomew. \par PSA 0.56\par LH 6.4\par PRO 6.1\par E 2 23\par FSH 5.7\par TT not done \par \par Patient seen  4/7/2022 to review above. He continues to have sensitivity in the RLQ and right inguinal area. ED continues to be an issue for him.\par We discussed the patient's continued discomfort in the right inguinal area and right lower quadrant in view of his prior laparoscopic bilateral herniorrhaphy. I recommended that we assess this with a CT scan of the abdomen and pelvis with oral contrast only. He is amenable with this suggestion after we discussed the indications risks alternatives and chance for success and the CT scan is ordered today.\par As for the evaluation thus far for the patient's ED, I again discussed the use of PDE 5 on medication and recommended he try sildenafil citrate 50 to 100 mg per dose. Because of his cardiac issues and his glaucoma, I recommended that we discussed this with his cardiologist [place] and his ophthalmologist Dr. Matias santana. To that end, I sent messages via [employer] to each of the doctors and asked the patient to contact them also. After we receive confirmation that they have no issues with using that medication then I will order the medicine for him from [facility] as sildenafil 100 mg, 1/2 to 1 tab 1 hour before activity PRN daily x 30 x 3. In my assessment I do not see any problems with use of the medication at this point. \par \par CT scan 5/6/2022:\par IMPRESSION:\par Small fat-containing right inguinal hernia.\par Small to moderate size left inguinal hernia containing non-obstructed loop of small bowel.\par Enlarged prostate.\par \par Patient seen 7/8/2022 to discuss findings and treatment. Patient states he is doing well overall. He is using a hernia belt for his right sided discomfort, and his pain has improved greatly. He is hesitant to have another surgery, but is open to an evaluation by general surgeon. He is still experiencing difficulty with erections, and would like to try the sildenafil as he has now gotten cardiac clearance from Dr. Bartholomew and opthalmology clearance from Dr. Salcedo. He denies any issues with his urinary symptoms. \par \par UA: negative\par IPSS: 6\par XOCHITL: 12\par JOSE: 4 \par \par We discussed the findings of the CT scan which showed a right fat containing inguinal hernia and a left bowel containing inguinal hernia. Patient has had relief from hernia belt, however, we believe it would be best for him to evaluated by a general surgeon. He agrees with this plan. We discussed the signs and symptoms of an emergent situation, which include severe pain, nausea, vomiting, fever. If these occur, he understands he should report directly to ED. \par \par As for his erectile dysfunction, patient has cardiac and opthalmology clearance, so we wishes to try sildenafil. We prescribed sildenafil 100 mg 1/2-1 tab to be taken 1 hr before sexual activity on an empty stomach. It was sent to [facility], we informed him how to use the GoodRx coupon. We discussed goals and side effects of medication. \par \par He will follow up in 3 months for re-evaluation of ED, inguinal hernias.\par \par Patient seen TODAY 11/9/2022 to reassess his ED and inguinal hernias. He continues sildenafil 100 mg with no improvement with ED He also found the tadalafil 5 mg daily to be unsuccessful.. No issues with urination. Denies dysuria and gross hematuria. He is having an upcoming inguinal hernia repair in December. He is also planning to have a cardiac ablation done for his A-fib, which was recently diagnosed. \par \par UA traced RBC\par IPSS 9\par XOCHITL 6\par JOSE 4\par \par \par Patient on medication for  HTN, closed angle glaucoma, cholesterol. NKMA, allergic to seafood.\par  The patient denies fevers, chills, nausea and or vomiting and no unexplained weight loss. \par All pertinent parts of the patient PFSH (past medical, family and social histories), laboratory, radiological studies and physician notes were reviewed prior to starting the face to face portion of the  visit. Questionnaire results were discussed with patient.\par \par

## 2022-11-09 NOTE — ASSESSMENT
Isabelleevelin Cuevas, wife, called to let us know that Maria Eugenia Reyes is not getting better, his penis is still red and swollen  He had seen Dr Burk on 07/01/2020 for this issue  The antibiotic that was given did not help and is getting worse  It was discussed in his appointment that if he was not better Dr Burk would put a referral in for Urology  Sami Cuevas is aware that Dr Burk is out of the office until Tuesday  She does not want to wait that long and is asking if a covering provider can put one in?  Please advise [FreeTextEntry1] : We discussed further options for his ED. I recommend that he continue on the  tadalafil 5 mg daily, and add  sildenafil 100 mg PRN 1 hour before activity. However, given his newly diagnosed a-fib, we discussed waiting until after his hernia suegery and cardiac ablation to initiate this change.  I advised him to get cleared by his cardiologist for this regimen. His BP was re-checked manually, 138/88 on BP meds. Regarding the trace RBC which was newly found today, found on UA dip today, we sent a UA micro, culture, and cytology for further evaluation. If all remains stable, he will follow up in 6 months after his inguinal hernia repair. Dasia Corrigan MD\par

## 2022-11-10 ENCOUNTER — TRANSCRIPTION ENCOUNTER (OUTPATIENT)
Age: 67
End: 2022-11-10

## 2022-11-11 ENCOUNTER — TRANSCRIPTION ENCOUNTER (OUTPATIENT)
Age: 67
End: 2022-11-11

## 2022-11-14 ENCOUNTER — TRANSCRIPTION ENCOUNTER (OUTPATIENT)
Age: 67
End: 2022-11-14

## 2022-11-16 ENCOUNTER — TRANSCRIPTION ENCOUNTER (OUTPATIENT)
Age: 67
End: 2022-11-16

## 2022-11-16 ENCOUNTER — NON-APPOINTMENT (OUTPATIENT)
Age: 67
End: 2022-11-16

## 2022-11-17 ENCOUNTER — TRANSCRIPTION ENCOUNTER (OUTPATIENT)
Age: 67
End: 2022-11-17

## 2022-11-17 LAB
BACTERIA UR CULT: ABNORMAL
URINE CYTOLOGY: NORMAL

## 2022-11-21 ENCOUNTER — RX RENEWAL (OUTPATIENT)
Age: 67
End: 2022-11-21

## 2022-11-21 ENCOUNTER — APPOINTMENT (OUTPATIENT)
Dept: INTERNAL MEDICINE | Facility: CLINIC | Age: 67
End: 2022-11-21

## 2022-11-21 VITALS
SYSTOLIC BLOOD PRESSURE: 127 MMHG | TEMPERATURE: 97.3 F | WEIGHT: 232 LBS | OXYGEN SATURATION: 97 % | BODY MASS INDEX: 33.21 KG/M2 | DIASTOLIC BLOOD PRESSURE: 88 MMHG | HEIGHT: 70 IN | HEART RATE: 102 BPM

## 2022-11-21 DIAGNOSIS — J92.9 PLEURAL PLAQUE W/OUT ASBESTOS: ICD-10-CM

## 2022-11-21 PROCEDURE — 99214 OFFICE O/P EST MOD 30 MIN: CPT

## 2022-12-07 ENCOUNTER — APPOINTMENT (OUTPATIENT)
Dept: HEART AND VASCULAR | Facility: CLINIC | Age: 67
End: 2022-12-07

## 2022-12-07 VITALS
DIASTOLIC BLOOD PRESSURE: 84 MMHG | BODY MASS INDEX: 33.21 KG/M2 | TEMPERATURE: 97.6 F | HEART RATE: 107 BPM | SYSTOLIC BLOOD PRESSURE: 124 MMHG | HEIGHT: 70 IN | WEIGHT: 232 LBS | RESPIRATION RATE: 12 BRPM | OXYGEN SATURATION: 98 %

## 2022-12-07 DIAGNOSIS — Q24.4 CONGENITAL SUBAORTIC STENOSIS: ICD-10-CM

## 2022-12-07 PROCEDURE — 99214 OFFICE O/P EST MOD 30 MIN: CPT

## 2022-12-07 NOTE — DISCUSSION/SUMMARY
[FreeTextEntry1] : stable exam\par no cardiac contra indication to planned procedure\par subaortic membrane stable\par afib stable may hold eliquis prior to procedure as per protocol, resume next day post op as per surgery

## 2022-12-07 NOTE — PHYSICAL EXAM
[Well Developed] : well developed [Well Nourished] : well nourished [No Acute Distress] : no acute distress [Normal Conjunctiva] : normal conjunctiva [Normal Venous Pressure] : normal venous pressure [No Carotid Bruit] : no carotid bruit [Normal S1, S2] : normal S1, S2 [No Rub] : no rub [No Gallop] : no gallop [Murmur] : murmur [Clear Lung Fields] : clear lung fields [Good Air Entry] : good air entry [No Respiratory Distress] : no respiratory distress  [Soft] : abdomen soft [Non Tender] : non-tender [No Masses/organomegaly] : no masses/organomegaly [Normal Bowel Sounds] : normal bowel sounds [Normal Gait] : normal gait [No Edema] : no edema [No Cyanosis] : no cyanosis [No Clubbing] : no clubbing [No Rash] : no rash [Moves all extremities] : moves all extremities [No Focal Deficits] : no focal deficits [Normal Speech] : normal speech [Alert and Oriented] : alert and oriented [Normal memory] : normal memory [de-identified] : 2/6

## 2022-12-08 ENCOUNTER — TRANSCRIPTION ENCOUNTER (OUTPATIENT)
Age: 67
End: 2022-12-08

## 2022-12-12 ENCOUNTER — TRANSCRIPTION ENCOUNTER (OUTPATIENT)
Age: 67
End: 2022-12-12

## 2022-12-13 ENCOUNTER — TRANSCRIPTION ENCOUNTER (OUTPATIENT)
Age: 67
End: 2022-12-13

## 2022-12-19 ENCOUNTER — TRANSCRIPTION ENCOUNTER (OUTPATIENT)
Age: 67
End: 2022-12-19

## 2022-12-21 ENCOUNTER — TRANSCRIPTION ENCOUNTER (OUTPATIENT)
Age: 67
End: 2022-12-21

## 2023-01-17 ENCOUNTER — TRANSCRIPTION ENCOUNTER (OUTPATIENT)
Age: 68
End: 2023-01-17

## 2023-02-06 ENCOUNTER — APPOINTMENT (OUTPATIENT)
Dept: INTERNAL MEDICINE | Facility: CLINIC | Age: 68
End: 2023-02-06
Payer: MEDICAID

## 2023-02-06 ENCOUNTER — APPOINTMENT (OUTPATIENT)
Dept: HEART AND VASCULAR | Facility: CLINIC | Age: 68
End: 2023-02-06
Payer: MEDICARE

## 2023-02-06 ENCOUNTER — NON-APPOINTMENT (OUTPATIENT)
Age: 68
End: 2023-02-06

## 2023-02-06 ENCOUNTER — TRANSCRIPTION ENCOUNTER (OUTPATIENT)
Age: 68
End: 2023-02-06

## 2023-02-06 VITALS
WEIGHT: 232 LBS | TEMPERATURE: 97.1 F | DIASTOLIC BLOOD PRESSURE: 80 MMHG | HEART RATE: 66 BPM | OXYGEN SATURATION: 97 % | SYSTOLIC BLOOD PRESSURE: 152 MMHG | HEIGHT: 70 IN | BODY MASS INDEX: 33.21 KG/M2

## 2023-02-06 VITALS
SYSTOLIC BLOOD PRESSURE: 148 MMHG | RESPIRATION RATE: 12 BRPM | BODY MASS INDEX: 33.07 KG/M2 | DIASTOLIC BLOOD PRESSURE: 74 MMHG | OXYGEN SATURATION: 97 % | TEMPERATURE: 98.8 F | WEIGHT: 231 LBS | HEART RATE: 68 BPM | HEIGHT: 70 IN

## 2023-02-06 DIAGNOSIS — E66.9 OBESITY, UNSPECIFIED: ICD-10-CM

## 2023-02-06 DIAGNOSIS — J86.9 PYOTHORAX W/OUT FISTULA: ICD-10-CM

## 2023-02-06 DIAGNOSIS — K40.90 UNILATERAL INGUINAL HERNIA, W/OUT OBSTRUCTION OR GANGRENE, NOT SPECIFIED AS RECURRENT: ICD-10-CM

## 2023-02-06 PROCEDURE — 99214 OFFICE O/P EST MOD 30 MIN: CPT

## 2023-02-06 PROCEDURE — 93000 ELECTROCARDIOGRAM COMPLETE: CPT

## 2023-02-06 NOTE — HISTORY OF PRESENT ILLNESS
[FreeTextEntry1] : feels well, no new c/o\par active\par hernia repair went well\par afib ablation went well

## 2023-02-06 NOTE — REASON FOR VISIT
[Arrhythmia/ECG Abnorrmalities] : arrhythmia/ECG abnormalities [Structural Heart and Valve Disease] : structural heart and valve disease [Hyperlipidemia] : hyperlipidemia [Hypertension] : hypertension [Carotid, Aortic and Peripheral Vascular Disease] : carotid, aortic and peripheral vascular disease

## 2023-02-06 NOTE — PHYSICAL EXAM
[Well Developed] : well developed [Well Nourished] : well nourished [No Acute Distress] : no acute distress [Normal Conjunctiva] : normal conjunctiva [Normal Venous Pressure] : normal venous pressure [No Carotid Bruit] : no carotid bruit [Normal S1, S2] : normal S1, S2 [No Rub] : no rub [No Gallop] : no gallop [Murmur] : murmur [Clear Lung Fields] : clear lung fields [Good Air Entry] : good air entry [No Respiratory Distress] : no respiratory distress  [Soft] : abdomen soft [Non Tender] : non-tender [No Masses/organomegaly] : no masses/organomegaly [Normal Bowel Sounds] : normal bowel sounds [Normal Gait] : normal gait [No Edema] : no edema [No Cyanosis] : no cyanosis [No Clubbing] : no clubbing [No Rash] : no rash [Moves all extremities] : moves all extremities [No Focal Deficits] : no focal deficits [Normal Speech] : normal speech [Alert and Oriented] : alert and oriented [Normal memory] : normal memory [de-identified] : 2/6

## 2023-02-07 ENCOUNTER — TRANSCRIPTION ENCOUNTER (OUTPATIENT)
Age: 68
End: 2023-02-07

## 2023-02-14 ENCOUNTER — TRANSCRIPTION ENCOUNTER (OUTPATIENT)
Age: 68
End: 2023-02-14

## 2023-02-15 ENCOUNTER — TRANSCRIPTION ENCOUNTER (OUTPATIENT)
Age: 68
End: 2023-02-15

## 2023-02-26 ENCOUNTER — TRANSCRIPTION ENCOUNTER (OUTPATIENT)
Age: 68
End: 2023-02-26

## 2023-02-27 ENCOUNTER — TRANSCRIPTION ENCOUNTER (OUTPATIENT)
Age: 68
End: 2023-02-27

## 2023-03-06 NOTE — PHYSICAL THERAPY INITIAL EVALUATION ADULT - PATIENT/FAMILY AGREES WITH PLAN
[Appropriately responsive] : appropriately responsive [Alert] : alert [No Acute Distress] : no acute distress [Soft] : soft [Non-tender] : non-tender [Non-distended] : non-distended [No Lesions] : no lesions [No Mass] : no mass [Oriented x3] : oriented x3 yes [FreeTextEntry6] : No masses, no lesions, nontender, no lymphadenopathy, no discharge. Normal implants without any palpable abnormality. [Labia Majora] : normal [Labia Minora] : normal [No Bleeding] : There was no active vaginal bleeding [Normal] : normal [Tenderness] : nontender [Enlarged ___ wks] : not enlarged [Anteversion] : anteverted [Mass ___ cm] : no uterine mass was palpated [Uterine Adnexae] : normal

## 2023-03-13 ENCOUNTER — TRANSCRIPTION ENCOUNTER (OUTPATIENT)
Age: 68
End: 2023-03-13

## 2023-03-13 ENCOUNTER — RX RENEWAL (OUTPATIENT)
Age: 68
End: 2023-03-13

## 2023-03-13 ENCOUNTER — NON-APPOINTMENT (OUTPATIENT)
Age: 68
End: 2023-03-13

## 2023-03-14 ENCOUNTER — APPOINTMENT (OUTPATIENT)
Dept: INTERNAL MEDICINE | Facility: CLINIC | Age: 68
End: 2023-03-14
Payer: MEDICARE

## 2023-03-14 DIAGNOSIS — S62.91XA UNSPECIFIED FRACTURE OF RIGHT WRIST AND HAND, INITIAL ENCOUNTER FOR CLOSED FRACTURE: ICD-10-CM

## 2023-03-14 DIAGNOSIS — S02.401A MAXILLARY FRACTURE, UNSPECIFIED SIDE, INITIAL ENCOUNTER FOR CLOSED FRACTURE: ICD-10-CM

## 2023-03-14 PROCEDURE — 99443: CPT | Mod: 95

## 2023-03-15 ENCOUNTER — TRANSCRIPTION ENCOUNTER (OUTPATIENT)
Age: 68
End: 2023-03-15

## 2023-03-16 ENCOUNTER — NON-APPOINTMENT (OUTPATIENT)
Age: 68
End: 2023-03-16

## 2023-03-16 ENCOUNTER — APPOINTMENT (OUTPATIENT)
Dept: ORTHOPEDIC SURGERY | Facility: CLINIC | Age: 68
End: 2023-03-16
Payer: MEDICARE

## 2023-03-16 ENCOUNTER — APPOINTMENT (OUTPATIENT)
Dept: OPHTHALMOLOGY | Facility: CLINIC | Age: 68
End: 2023-03-16
Payer: MEDICARE

## 2023-03-16 ENCOUNTER — APPOINTMENT (OUTPATIENT)
Dept: OTOLARYNGOLOGY | Facility: CLINIC | Age: 68
End: 2023-03-16
Payer: MEDICARE

## 2023-03-16 ENCOUNTER — APPOINTMENT (OUTPATIENT)
Dept: OTOLARYNGOLOGY | Facility: CLINIC | Age: 68
End: 2023-03-16

## 2023-03-16 VITALS — HEIGHT: 70 IN | RESPIRATION RATE: 16 BRPM | BODY MASS INDEX: 33.21 KG/M2 | WEIGHT: 232 LBS

## 2023-03-16 DIAGNOSIS — H05.231: ICD-10-CM

## 2023-03-16 DIAGNOSIS — S05.11XS: ICD-10-CM

## 2023-03-16 DIAGNOSIS — S05.11XA CONTUSION OF EYEBALL AND ORBITAL TISSUES, RIGHT EYE, INITIAL ENCOUNTER: ICD-10-CM

## 2023-03-16 DIAGNOSIS — J34.3 HYPERTROPHY OF NASAL TURBINATES: ICD-10-CM

## 2023-03-16 DIAGNOSIS — R04.0 EPISTAXIS: ICD-10-CM

## 2023-03-16 PROCEDURE — 73130 X-RAY EXAM OF HAND: CPT | Mod: LT

## 2023-03-16 PROCEDURE — 92250 FUNDUS PHOTOGRAPHY W/I&R: CPT

## 2023-03-16 PROCEDURE — 92020 GONIOSCOPY: CPT

## 2023-03-16 PROCEDURE — 31231 NASAL ENDOSCOPY DX: CPT

## 2023-03-16 PROCEDURE — 92083 EXTENDED VISUAL FIELD XM: CPT

## 2023-03-16 PROCEDURE — 29085 APPL CAST HAND&LWR FOREARM: CPT

## 2023-03-16 PROCEDURE — 92014 COMPRE OPH EXAM EST PT 1/>: CPT

## 2023-03-16 PROCEDURE — 73110 X-RAY EXAM OF WRIST: CPT | Mod: RT

## 2023-03-16 PROCEDURE — 99203 OFFICE O/P NEW LOW 30 MIN: CPT | Mod: 25

## 2023-03-17 ENCOUNTER — TRANSCRIPTION ENCOUNTER (OUTPATIENT)
Age: 68
End: 2023-03-17

## 2023-03-18 ENCOUNTER — APPOINTMENT (OUTPATIENT)
Dept: CT IMAGING | Facility: CLINIC | Age: 68
End: 2023-03-18

## 2023-03-18 ENCOUNTER — APPOINTMENT (OUTPATIENT)
Dept: CT IMAGING | Facility: CLINIC | Age: 68
End: 2023-03-18
Payer: MEDICARE

## 2023-03-18 ENCOUNTER — RESULT REVIEW (OUTPATIENT)
Age: 68
End: 2023-03-18

## 2023-03-18 ENCOUNTER — OUTPATIENT (OUTPATIENT)
Dept: OUTPATIENT SERVICES | Facility: HOSPITAL | Age: 68
LOS: 1 days | End: 2023-03-18

## 2023-03-18 DIAGNOSIS — Z93.8 OTHER ARTIFICIAL OPENING STATUS: Chronic | ICD-10-CM

## 2023-03-18 PROCEDURE — 70486 CT MAXILLOFACIAL W/O DYE: CPT | Mod: 26,MH

## 2023-03-18 PROCEDURE — 74176 CT ABD & PELVIS W/O CONTRAST: CPT | Mod: 26,MH

## 2023-03-18 PROCEDURE — 73200 CT UPPER EXTREMITY W/O DYE: CPT | Mod: 26,RT,MA,76

## 2023-03-20 ENCOUNTER — APPOINTMENT (OUTPATIENT)
Dept: HEART AND VASCULAR | Facility: CLINIC | Age: 68
End: 2023-03-20
Payer: MEDICARE

## 2023-03-20 VITALS
WEIGHT: 234 LBS | SYSTOLIC BLOOD PRESSURE: 130 MMHG | HEART RATE: 66 BPM | RESPIRATION RATE: 12 BRPM | OXYGEN SATURATION: 99 % | HEIGHT: 70 IN | DIASTOLIC BLOOD PRESSURE: 78 MMHG | TEMPERATURE: 98 F | BODY MASS INDEX: 33.5 KG/M2

## 2023-03-20 VITALS
BODY MASS INDEX: 33.5 KG/M2 | OXYGEN SATURATION: 99 % | DIASTOLIC BLOOD PRESSURE: 78 MMHG | HEIGHT: 70 IN | TEMPERATURE: 98 F | SYSTOLIC BLOOD PRESSURE: 130 MMHG | HEART RATE: 66 BPM | WEIGHT: 234 LBS

## 2023-03-20 DIAGNOSIS — Q24.4 CONGENITAL SUBAORTIC STENOSIS: ICD-10-CM

## 2023-03-20 DIAGNOSIS — I35.0 NONRHEUMATIC AORTIC (VALVE) STENOSIS: ICD-10-CM

## 2023-03-20 PROCEDURE — 93000 ELECTROCARDIOGRAM COMPLETE: CPT | Mod: NC

## 2023-03-20 PROCEDURE — 99214 OFFICE O/P EST MOD 30 MIN: CPT

## 2023-03-20 NOTE — PHYSICAL EXAM
[Well Developed] : well developed [Well Nourished] : well nourished [No Acute Distress] : no acute distress [Normal Conjunctiva] : normal conjunctiva [Normal Venous Pressure] : normal venous pressure [No Carotid Bruit] : no carotid bruit [Normal S1, S2] : normal S1, S2 [No Rub] : no rub [No Gallop] : no gallop [Murmur] : murmur [Clear Lung Fields] : clear lung fields [Good Air Entry] : good air entry [No Respiratory Distress] : no respiratory distress  [Soft] : abdomen soft [Non Tender] : non-tender [No Masses/organomegaly] : no masses/organomegaly [Normal Bowel Sounds] : normal bowel sounds [Normal Gait] : normal gait [No Edema] : no edema [No Cyanosis] : no cyanosis [No Clubbing] : no clubbing [Moves all extremities] : moves all extremities [No Focal Deficits] : no focal deficits [Normal Speech] : normal speech [Alert and Oriented] : alert and oriented [Normal memory] : normal memory [de-identified] : 2/6 [de-identified] : multiple ecchymosis

## 2023-03-20 NOTE — DISCUSSION/SUMMARY
[FreeTextEntry1] : stable exam, labs and ecg\par recent negative cardiac eval\par medically optimized palnned procedure\par \par may hold eleiquis 2 days prior and resume day after surgery [EKG obtained to assist in diagnosis and management of assessed problem(s)] : EKG obtained to assist in diagnosis and management of assessed problem(s)

## 2023-03-21 ENCOUNTER — TRANSCRIPTION ENCOUNTER (OUTPATIENT)
Age: 68
End: 2023-03-21

## 2023-03-21 NOTE — DISCUSSION/SUMMARY
Appointment cancel. [FreeTextEntry1] : stable exam\par HTN stable\par Lipids stable\par TAA- cts f/u\par afib stable post ablation, in NSR, cont AC\par sub aortic membrane stable [EKG obtained to assist in diagnosis and management of assessed problem(s)] : EKG obtained to assist in diagnosis and management of assessed problem(s)

## 2023-03-22 ENCOUNTER — TRANSCRIPTION ENCOUNTER (OUTPATIENT)
Age: 68
End: 2023-03-22

## 2023-03-22 ENCOUNTER — APPOINTMENT (OUTPATIENT)
Dept: ORTHOPEDIC SURGERY | Facility: AMBULATORY SURGERY CENTER | Age: 68
End: 2023-03-22
Payer: MEDICARE

## 2023-03-22 PROCEDURE — 26685 TREAT HAND DISLOCATION: CPT | Mod: RT

## 2023-03-23 ENCOUNTER — TRANSCRIPTION ENCOUNTER (OUTPATIENT)
Age: 68
End: 2023-03-23

## 2023-03-24 ENCOUNTER — TRANSCRIPTION ENCOUNTER (OUTPATIENT)
Age: 68
End: 2023-03-24

## 2023-03-29 ENCOUNTER — APPOINTMENT (OUTPATIENT)
Dept: OTOLARYNGOLOGY | Facility: CLINIC | Age: 68
End: 2023-03-29
Payer: MEDICARE

## 2023-03-29 DIAGNOSIS — T14.8XXA OTHER INJURY OF UNSPECIFIED BODY REGION, INITIAL ENCOUNTER: ICD-10-CM

## 2023-03-29 DIAGNOSIS — D49.0 NEOPLASM OF UNSPECIFIED BEHAVIOR OF DIGESTIVE SYSTEM: ICD-10-CM
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
## 2023-03-29 PROCEDURE — 99213 OFFICE O/P EST LOW 20 MIN: CPT

## 2023-03-29 NOTE — PHYSICAL EXAM
[] : septum deviated bilaterally [Midline] : trachea located in midline position [Normal] : no rashes [de-identified] : Lt parotis lesion for FNA [de-identified] : Right Orbital Hematoma, Right forehead bruises and scratches,   Right cheek hematoma, Right nasal epistaxis.  Turbinate Hypertrophy.  Deviated Nasal Septum.  Bacitracin ointment was inserted into patient right nostril for lubrication.

## 2023-03-29 NOTE — HISTORY OF PRESENT ILLNESS
[de-identified] : 67 years old male patient with history of  status post accidental fall on 03/10/2023. Sinus CT-Scan impression  Turbinate Hypertrophy.  Deviated Nasal Septum.  Nondisplaced Nasal Fracture.  Incidental findings of Left Parotid Tumor.  Patient will return in two months for Fine Needle Aspiration.

## 2023-03-29 NOTE — REVIEW OF SYSTEMS
[Patient Intake Form Reviewed] : Patient intake form was reviewed [Nasal Congestion] : nasal congestion [Nose Bleeds] : nose bleeds [Swelling Face] : face swelling [As Noted in HPI] : as noted in HPI [Eye Pain] : eye pain [Eyesight Problems] : eyesight problems [Negative] : Heme/Lymph [FreeTextEntry4] : Facial pressure

## 2023-03-29 NOTE — REASON FOR VISIT
[Subsequent Evaluation] : a subsequent evaluation for [FreeTextEntry2] : Status post accidental fall on 03/10/2023. Sinus CT-Scan impression

## 2023-04-03 NOTE — HISTORY OF PRESENT ILLNESS
[de-identified] : 67 years old male patient with history of  status post accidental fall on 03/10/2023.  As reported by patient  he was walking out from a building into the sidewalk going down steps.  When he accidently fell into the ground.  Approximately at  4:00 pm. Right Orbital Hematoma, Right forehead bruises and scratches,   Right cheek hematoma, Right nasal epistaxis.  Turbinate Hypertrophy.  Deviated Nasal Septum.  Bacitracin ointment was inserted into patient right nostril for lubrication.

## 2023-04-03 NOTE — CONSULT LETTER
[Dear  ___] : Dear  [unfilled], [Consult Letter:] : I had the pleasure of evaluating your patient, [unfilled]. [Please see my note below.] : Please see my note below. [Consult Closing:] : Thank you very much for allowing me to participate in the care of this patient.  If you have any questions, please do not hesitate to contact me. [Sincerely,] : Sincerely, [FreeTextEntry3] : Corona Lowe MD, FACS\par Professor of Otolaryngology, [employer] [facility]\par Director, [facility], Department of Otolaryngology, [facility]\par , Head & Neck Service Line, [facility]\par

## 2023-04-03 NOTE — PROCEDURE
[de-identified] : Right Orbital Hematoma, Right forehead bruises and scratches,   Right cheek hematoma, Right nasal epistaxis.  Turbinate Hypertrophy.  Deviated Nasal Septum.  Bacitracin ointment was inserted into patient right nostril for lubrication.

## 2023-04-03 NOTE — PHYSICAL EXAM
[] : septum deviated bilaterally [Normal] : mucosa is normal [Midline] : trachea located in midline position [de-identified] : Right Orbital Hematoma, Right forehead bruises and scratches,   Right cheek hematoma, Right nasal epistaxis.  Turbinate Hypertrophy.  Deviated Nasal Septum.  Bacitracin ointment was inserted into patient right nostril for lubrication.

## 2023-04-03 NOTE — REASON FOR VISIT
[Initial Evaluation] : an initial evaluation for [FreeTextEntry2] : Status post accidental fall on 03/10/2023.  As reported by patient  he was walking out from a building into the sidewalk going down steps when he accidently fell into the ground.  Approximately at  4:00 pm. Patient states his level of severity is a level 10 out of 10 and it occurs constant.  Patient states nothing helps to improve or worsens his Status post accidental fall on 03/10/2023.  His accidental fall

## 2023-04-11 ENCOUNTER — APPOINTMENT (OUTPATIENT)
Dept: ORTHOPEDIC SURGERY | Facility: CLINIC | Age: 68
End: 2023-04-11
Payer: MEDICARE

## 2023-04-11 PROCEDURE — 99024 POSTOP FOLLOW-UP VISIT: CPT

## 2023-04-11 PROCEDURE — 73130 X-RAY EXAM OF HAND: CPT | Mod: RT

## 2023-04-12 ENCOUNTER — RX RENEWAL (OUTPATIENT)
Age: 68
End: 2023-04-12

## 2023-04-12 ENCOUNTER — APPOINTMENT (OUTPATIENT)
Dept: OPHTHALMOLOGY | Facility: CLINIC | Age: 68
End: 2023-04-12

## 2023-05-01 PROBLEM — S62.309A FRACTURE, METACARPAL: Status: ACTIVE | Noted: 2023-03-16

## 2023-05-02 ENCOUNTER — APPOINTMENT (OUTPATIENT)
Dept: ORTHOPEDIC SURGERY | Facility: CLINIC | Age: 68
End: 2023-05-02
Payer: MEDICARE

## 2023-05-02 DIAGNOSIS — S62.309A UNSPECIFIED FRACTURE OF UNSPECIFIED METACARPAL BONE, INITIAL ENCOUNTER FOR CLOSED FRACTURE: ICD-10-CM

## 2023-05-02 PROCEDURE — 73130 X-RAY EXAM OF HAND: CPT | Mod: RT

## 2023-05-02 PROCEDURE — 99024 POSTOP FOLLOW-UP VISIT: CPT

## 2023-05-16 ENCOUNTER — APPOINTMENT (OUTPATIENT)
Dept: ORTHOPEDIC SURGERY | Facility: CLINIC | Age: 68
End: 2023-05-16

## 2023-05-22 ENCOUNTER — RX RENEWAL (OUTPATIENT)
Age: 68
End: 2023-05-22

## 2023-06-13 ENCOUNTER — TRANSCRIPTION ENCOUNTER (OUTPATIENT)
Age: 68
End: 2023-06-13

## 2023-06-29 ENCOUNTER — APPOINTMENT (OUTPATIENT)
Dept: RADIOLOGY | Facility: CLINIC | Age: 68
End: 2023-06-29
Payer: MEDICARE

## 2023-06-29 ENCOUNTER — APPOINTMENT (OUTPATIENT)
Dept: MRI IMAGING | Facility: CLINIC | Age: 68
End: 2023-06-29
Payer: MEDICARE

## 2023-06-29 ENCOUNTER — OUTPATIENT (OUTPATIENT)
Dept: OUTPATIENT SERVICES | Facility: HOSPITAL | Age: 68
LOS: 1 days | End: 2023-06-29

## 2023-06-29 ENCOUNTER — APPOINTMENT (OUTPATIENT)
Dept: PHYSICAL MEDICINE AND REHAB | Facility: CLINIC | Age: 68
End: 2023-06-29
Payer: MEDICARE

## 2023-06-29 ENCOUNTER — RESULT REVIEW (OUTPATIENT)
Age: 68
End: 2023-06-29

## 2023-06-29 VITALS
DIASTOLIC BLOOD PRESSURE: 73 MMHG | HEART RATE: 67 BPM | SYSTOLIC BLOOD PRESSURE: 166 MMHG | WEIGHT: 234 LBS | HEIGHT: 70 IN | BODY MASS INDEX: 33.5 KG/M2 | OXYGEN SATURATION: 97 % | RESPIRATION RATE: 18 BRPM

## 2023-06-29 DIAGNOSIS — Z93.8 OTHER ARTIFICIAL OPENING STATUS: Chronic | ICD-10-CM

## 2023-06-29 PROCEDURE — 73010 X-RAY EXAM OF SHOULDER BLADE: CPT | Mod: 26,RT

## 2023-06-29 PROCEDURE — 73030 X-RAY EXAM OF SHOULDER: CPT | Mod: 26,RT

## 2023-06-29 PROCEDURE — 72141 MRI NECK SPINE W/O DYE: CPT | Mod: 26,MH

## 2023-06-29 PROCEDURE — 72072 X-RAY EXAM THORAC SPINE 3VWS: CPT | Mod: 26

## 2023-06-29 PROCEDURE — 99215 OFFICE O/P EST HI 40 MIN: CPT

## 2023-06-30 ENCOUNTER — APPOINTMENT (OUTPATIENT)
Dept: MRI IMAGING | Facility: CLINIC | Age: 68
End: 2023-06-30
Payer: MEDICARE

## 2023-06-30 ENCOUNTER — APPOINTMENT (OUTPATIENT)
Dept: MRI IMAGING | Facility: CLINIC | Age: 68
End: 2023-06-30

## 2023-06-30 ENCOUNTER — OUTPATIENT (OUTPATIENT)
Dept: OUTPATIENT SERVICES | Facility: HOSPITAL | Age: 68
LOS: 1 days | End: 2023-06-30

## 2023-06-30 DIAGNOSIS — Z93.8 OTHER ARTIFICIAL OPENING STATUS: Chronic | ICD-10-CM

## 2023-06-30 PROCEDURE — 71555 MRI ANGIO CHEST W OR W/O DYE: CPT | Mod: 26,MH

## 2023-07-04 ENCOUNTER — TRANSCRIPTION ENCOUNTER (OUTPATIENT)
Age: 68
End: 2023-07-04

## 2023-07-05 ENCOUNTER — TRANSCRIPTION ENCOUNTER (OUTPATIENT)
Age: 68
End: 2023-07-05

## 2023-07-07 NOTE — PHYSICAL EXAM
[FreeTextEntry1] : GEN: NAD, well dressed, well nourished\par HEENT: NCAT, oropharynx clear\par CV: S1S2, RRR\par RESP: on room air, no labored breathing\par ABD: non distended\par EXT: well perfused, no calf tenderness\par \par RIGHT SHOULDER:\par neg effusion\par neg scars or lacerations or lesions\par neg increased warmth\par neg scapular winging\par neg muscle atrophy\par \par Palpation:\par no TTP over sterna-clavicular joint\par no TTP over clavicle\par no TTP over coracoid process\par no TTP over sternum\par +TTP over AC joint\par no TTP over humerus\par no TTP over the spine of the scapula\par no TTP over the medial border of the scapula, superior angle, inferior angle, lateral border\par +TTP over supraspinatus\par +TTP over infraspinatus\par + TTP over upper trapezius\par +TTP over deltoid muscle\par no TTP in the axilla\par neg crepitus with PROM shoulder\par \par AROM:\par active range of motion limited in planes as below\par scapulohumeral rhythm was not smooth, appropriate \par \par Abduction 120/170-180\par Forward Flexion 110/160-180\par Elevation through the plane of the scapula 110/170-180\par External Rotation 70/80-90\par Internal Rotation 45/\par Extension 25/50-60\par Adduction 25/50-75\par \par PROM consistent with above\par \par neg sulcus sign\par neg Neer test\par neg Coffey test\par neg Fani’s test\par neg Speed’s test\par neg Yergason’s test\par neg drop arm test\par neg empty can test\par neg Coconino's Test\par neg external rotation lag sign\par neg lift off sign\par neg hornblower’s sign\par neg Horizontal adduction test\par \par Sensation to light touch intact over all dermatomes about the shoulder\par Distal pulses present\par \par LEFT SHOULDER:\par neg effusion\par neg scars or lacerations or lesions\par neg increased warmth\par neg scapular winging\par neg muscle atrophy\par \par Palpation:\par no TTP over sterna-clavicular joint\par no TTP over clavicle\par no TTP over coracoid process\par no TTP over sternum\par no TTP over AC joint\par no TTP over humerus\par no TTP over the spine of the scapula\par no TTP over the medial border of the scapula, superior angle, inferior angle, lateral border\par no TTP over supraspinatus\par no TTP over infraspinatus\par no TTP over upper trapezius\par no TTP over deltoid muscle\par no TTP in the axilla\par neg crepitus with PROM shoulder\par \par AROM:\par active range of motion full and painless\par scapulohumeral rhythm was smooth, appropriate \par PROM consistent with above\par \par neg sulcus sign\par neg Neer test\par neg Coffey test\par neg Fani’s test\par neg Speed’s test\par neg Yergason’s test\par neg drop arm test\par neg empty can test\par neg Coconino's Test\par neg external rotation lag sign\par neg lift off sign\par neg hornblower’s sign\par neg Horizontal adduction test\par \par Sensation to light touch intact over all dermatomes about the shoulder\par Distal pulses present\par \par \par Manual Muscle Testing\par \par \par 	C5 (Shoulder Abduction)        C5 (Elbow Flex)	        C6 (Wrist Ext)   \par Right	4/5	                                4/5	                          5-/5	       \par Left	5/5	                                5/5	                          5/5	          \par \par 	C7 (Elbow Ext)            C7 (Wrist Flex)             C8 (Long Finger Flex)          T1 (Finger Abduct)           \par Right	4/5	                    5-/5                                      5/5	                                      5/5	                                                 \par Left	5/5	                    5/5                                      5/5	                                      5/5	                               \par \par 	C8 (Thumb Ext)            C8 & T1 (Thumb Opp)            \par Right	5/5	                           5/5	                                                 \par Left	5/5	                           5/5                             \par \par Resisted Internal Rotation\par Right 4/5\par Left 4/5\par \par Resisted External Rotation\par Right 4/5\par Left 4/5\par \par Gait:\par Non antalgic \par +  reciprocating heel to toe\par able to stand on toes and heels WITH hand holding\par Tandem gait intact WITH hand holding\par Poor single leg standing balance B/L\par Romberg negative\par \par Inspection: Spine alignment is midline, with no evidence of scoliosis. Iliac crest heights and PSIS heights level. \par + Forward head position.\par \par Palpation: There is + tenderness over the upper traps, middle traps, levator scaps, rhomboids, articular pillars, cervical paraspinals\par \par Cervical ROM: Flexion, extension, side-bending, rotation, limited due to pain with most pain at terminal ROM \par Finger to nose bilaterally intact\par \par Tone: Normal. No cog wheeling. \par Proprioception at DIPs intact B/L\par Sensation: Grossly intact to light touch and pinprick bilateral upper extremities.\par Reflexes: 2+ symmetric biceps, pronators, brachioradialis, triceps\par Guy’s Sign negative\par Spurling's Sign negative\par Shoulder Abduction Test (Bakody’s) absent\par Lhermitte’s Sign negative\par \par

## 2023-07-07 NOTE — HISTORY OF PRESENT ILLNESS
[FreeTextEntry1] : Byron Rain M.D.\par Sports Medicine and Interventional Spine\[facility] Department of Physical Medicine and Rehabilitation \[facility] \[facility] Email: [email or web address] <[email or web address]>\par \par [facility] Physician [facility]\par [facility] [place] [facility]\par [address]\par [place], [address]th Floor\[place] [address]\par \par [facility] Physician [facility] [place] [facility]\par [address], [address] Floor\[place] [address]\par \[facility]  \par [facility]\par [address]\par [place], [place] [address] \[facility] [place] Appointments\[facility] Phone: [phone]\par Fax: [phone]\par \par ----------------------------------------------------------------------------------------------------------------------------------------\par \par PATIENT: TOM PATEL \[facility] MRN: [id number] \par YOB: 1955 \par DATE OF SERVICE: 06/29/2023 \par \par Jun 29, 2023 \[facility] \[facility] Dear Drs.\[facility] \par Thank you for referring TOM PATEL to my Sports and Interventional Spine practice and office. Enclosed is a copy of the patient's consultation/progress note, which includes my complete assessment and recent studies completed during the patient's evaluation.\par \par If you have questions or have any patients who require nonsurgical, non-opiate management of any sports, spine, or musculoskeletal conditions, please do not hesitate to contact my , Rosey Munson at [phone].\par \par I look forward to taking care of your patients along with you.\par \par Sincerely,\par \par Byron\par \par Byron Rain MD\par Sports, Interventional Spine, & Regenerative Musculoskeletal Medicine\par [facility] [place] [facility]\par Email: [email or web address]\par \par \par                                                   Initial Consultation:\par CC: pain, arm\par \par HPI:  This is the first visit to [facility] [place] [facility] Sports Medicine and Interventional Spine Practice.  \par \par TOM PATEL presents with the chief complaint as above.  \par \par Initial Hx on 06/29/2023 :\par Presents in person to [facility]\par patient relates having been under treatment for cervical spondylosis with Dr. KVNG Reis (Pain)\par minimal pain over the cervical region, now presenting with more pain over the right shoulder\par 3/2023s/p fall, outdoors, trip and fall, fell on their right side, with right facial fractures, s/p right dominant wrist ORIF\par The patient’s difficulties began years ago, had been under treatments; right shoulder pain worsened after fall\par The pain is graded as moderate over the right shoulder\par points to the right anterior shoulder\par The pain is described as throbbing when it wakes patient from the sleep; reaching forward\par The pain is intermittently severe positionally worse (OH)\par The pain does not radiate, located over the right shoulder\par The patient feels that the pain is overall persistent \par Patient denies other recent fall, MVA, injury, trauma, or accident besides presenting history above\par \par Aggravating: forward flexion of the right shoulder, abduction of the right shoulder, overhead activity, lifting heavy items\par Alleviating: rest, activity modification, avoiding overhead activity, pharmacologic treatments (methocarbamol)\par \par Meds: denies regular PO pain medications besides PRN tylenol\par Therapy Program: no recent structured targeted therapy program\par HEP: doing HEP regularly\par \par Assoc Sx:\par Reports intermittent numbness, tingling paresthesia in the UPPER***LOWER limbs in a *** distribution\par Otherwise denies numbness, Tingling\par \par No indication of myelopathic type symptoms\par Denies Focal motor weakness in the upper or lower limbs\par Denies New or worsened bowel or bladder incontinence\par Denies Saddle anesthesia\par Denies Using Orthotic(s)/Supportive devices\par They also deny frequent tripping, falling\par \par ROS: A 14 point review of systems was completed. Positive findings are pain as described above. The remaining systems negative.\par \par Prostate Hx: up to date\par Breast Cancer Surveillance: up to date\par COVID HX: reviewed\par \par Assoc Hx:'\par walks 2.5 miles per day\par Ambulates without assistive device\par Injection Hx: denies locally directed treatment to the area in question\par Imaging Hx: reviewed\par \par Level of functioning: indep with ambulation, indep with ADLs\par Living Situation: dwelling with steps to enter

## 2023-07-07 NOTE — ASSESSMENT
[FreeTextEntry1] :                                                       Assessment/Plan:\par \par TOM PATEL is a 68 year male with right here for initial consultation.\par \par Rotator cuff tendinitis, right\par Adhesive capsulitis, right\par Spasm of the cervical paraspinous muscles\par Rhomboid Strain, right\par Scapular dyskinetic, type II, right\par DISH (cervical)\par Cervical spondylosis (ossification of the posterior longitudinal ligament at C5/6, C6/7)\par Chronic cervical radiculopathy, C5, C6, right\par Cervicalgia\par \par H/O AAA\par H/O atrial fibrillation (s/p ablation, on eliquis)\par H/O HTN\par \par S/p fall with facial fracture (3/2023)\par \par - Tiers of treatment and management of above diagnosis(es) were discussed with patient\par - Optimal diet, weight, sleep, and lifestyle management to minimize stress and maximize well being counseling provided\par - Imaging reviewed and discussed with patient\par - Reviewed previous encounter notes from 11/29/2021 Dr. KVNG Reis (Pain) \par - If patient obtains explicit weight bearing precautions from their Orthopedic Surgeon regarding upper extremity, the patient may commence a structured, targeted therapy program 2-3x/wk for 8 wks with goal toward HEP\par - Patient was educated on an appropriate home exercise program, provided with exercise recommendations, all questions answered\par - Patient may trial acetaminophen 1000mg up to TID PRN moderate to severe pain and to decrease average consumption of NSAIDs\par - Patient may trial topical compound cream to the right shoulder no more than twice per day as needed for next 2-3 weeks, Rx entered via portal, written information provided to the patient in addition to verbal explanation regarding the medication\par - Patient was advised to apply cool compresses or warm heat to affected regions PRN\par - Radiographs of right shoulder, scapula, thoracic ordered today \par \par - Educated about red flag symptoms including (but not limited to) new, worsened, or persistent: fever greater than 100F, bowel or bladder incontinence, bowel obstipation, inability to void urine, urinary leakage, Severe nausea or vomiting, Worsening numbness, worsening tingling/paresthesias, and/or new or progressive motor weakness; advised to seek immediate medical attention at his nearest Emergency department should they experience any of the above\par \par - MRI cervical spine without contrast is indicated given that the pt has not improved with tylenol, ibuprofen, naproxen, meloxicam, patient with known risk for cervical myelopathy due to multilevel ossification of the posterior longitudinal ligament at C5-7 with worsening right upper limb pain symptoms, balance issues, and physical therapy/home exercise program>6 weeks. Patient's imaging is medically necessary to outline targets for locally (interventional) directed treatments and/or guide surgical management.\par \par - Follow up in 2-3 weeks after imaging, in person in 2 months to assess their progress\par \par I have personally spent a total of at least 65 minutes preparing, reviewing internal and external records, explaining, counseling, and coordinating care for this patient encounter.\par \par Thank you, (s), for allowing me to participate in the care of your patient. Please do not hesitate to contact me with questions/concerns.\par \par Byron Rain M.D.\par Sports and Interventional Spine\[facility] Department of Physical Medicine and Rehabilitation \par [facility] \par Email: [email or web address]\par \par [facility]\par Orthopaedic [place] [facility] [address]\par [facility], 11th Floor\[place] [address]\[facility] \[facility] Appointments: [phone]\[facility] Fax: [phone]\[facility]

## 2023-07-09 ENCOUNTER — TRANSCRIPTION ENCOUNTER (OUTPATIENT)
Age: 68
End: 2023-07-09

## 2023-07-10 ENCOUNTER — NON-APPOINTMENT (OUTPATIENT)
Age: 68
End: 2023-07-10

## 2023-07-12 ENCOUNTER — LABORATORY RESULT (OUTPATIENT)
Age: 68
End: 2023-07-12

## 2023-07-12 ENCOUNTER — APPOINTMENT (OUTPATIENT)
Dept: UROLOGY | Facility: CLINIC | Age: 68
End: 2023-07-12
Payer: MEDICAID

## 2023-07-12 VITALS
DIASTOLIC BLOOD PRESSURE: 76 MMHG | SYSTOLIC BLOOD PRESSURE: 122 MMHG | TEMPERATURE: 98 F | OXYGEN SATURATION: 96 % | HEART RATE: 70 BPM

## 2023-07-12 PROCEDURE — 99214 OFFICE O/P EST MOD 30 MIN: CPT

## 2023-07-12 NOTE — HISTORY OF PRESENT ILLNESS
[FreeTextEntry1] : 69 YO M seen  3/3/2022 as NPT for inguinal hernia. ED by history. He told me that he had a bilateral inguinal hernia repair by laparoscopy with mesh 12/2019. He also had a left lung decortication 2018 for pneumonia which progressed to empyema. He was caring for his invalid mother who passed last year. After that he noted difficulty achieving and maintaining erection ( for 6 - 8 months, may have been present longer). He has not tried any treatment yet. He has also noted mild right groin discomfort intermittently over the last 2 - 3 months. No problems with urination, Nocturia x 2 - 3. \par We discussed possible causes of his ED with low libido and I recommended that we check a full hormonal panel in an AM blood draw. We will also check the PSA at that time. We also discussed treatment of his ED with PDE5i medication and he will consider this option. We reviewed the indications, risks, alternatives and chances for success with this therapy. We will readdress this after we have the results of the blood work. Before prescribing any PDE5i treatment, we will clear this with his Cardiologist, Souleymane Bartholomew. \par PSA 0.56\par LH 6.4\par PRO 6.1\par E 2 23\par FSH 5.7\par TT not done \par \par Patient seen  4/7/2022 to review above. He continues to have sensitivity in the RLQ and right inguinal area. ED continues to be an issue for him.\par We discussed the patient's continued discomfort in the right inguinal area and right lower quadrant in view of his prior laparoscopic bilateral herniorrhaphy. I recommended that we assess this with a CT scan of the abdomen and pelvis with oral contrast only. He is amenable with this suggestion after we discussed the indications risks alternatives and chance for success and the CT scan is ordered today.\par As for the evaluation thus far for the patient's ED, I again discussed the use of PDE 5 on medication and recommended he try sildenafil citrate 50 to 100 mg per dose. Because of his cardiac issues and his glaucoma, I recommended that we discussed this with his cardiologist [place] and his ophthalmologist Dr. Matias santana. To that end, I sent messages via [employer] to each of the doctors and asked the patient to contact them also. After we receive confirmation that they have no issues with using that medication then I will order the medicine for him from [facility] as sildenafil 100 mg, 1/2 to 1 tab 1 hour before activity PRN daily x 30 x 3. In my assessment I do not see any problems with use of the medication at this point. \par \par CT scan 5/6/2022:\par IMPRESSION:\par Small fat-containing right inguinal hernia.\par Small to moderate size left inguinal hernia containing non-obstructed loop of small bowel.\par Enlarged prostate.\par \par Patient seen 7/8/2022 to discuss findings and treatment. Patient states he is doing well overall. He is using a hernia belt for his right sided discomfort, and his pain has improved greatly. He is hesitant to have another surgery, but is open to an evaluation by general surgeon. He is still experiencing difficulty with erections, and would like to try the sildenafil as he has now gotten cardiac clearance from Dr. Bartholomew and opthalmology clearance from Dr. Salcedo. He denies any issues with his urinary symptoms. \par \par UA: negative\par IPSS: 6\par XOCHITL: 12\par JOSE: 4 \par \par We discussed the findings of the CT scan which showed a right fat containing inguinal hernia and a left bowel containing inguinal hernia. Patient has had relief from hernia belt, however, we believe it would be best for him to evaluated by a general surgeon. He agrees with this plan. We discussed the signs and symptoms of an emergent situation, which include severe pain, nausea, vomiting, fever. If these occur, he understands he should report directly to ED. \par \par As for his erectile dysfunction, patient has cardiac and opthalmology clearance, so we wishes to try sildenafil. We prescribed sildenafil 100 mg 1/2-1 tab to be taken 1 hr before sexual activity on an empty stomach. It was sent to [facility], we informed him how to use the GoodRx coupon. We discussed goals and side effects of medication. \par \par He will follow up in 3 months for re-evaluation of ED, inguinal hernias.\par \par Patient seen  11/9/2022 to reassess his ED and inguinal hernias. He continues sildenafil 100 mg with no improvement with ED He also found the tadalafil 5 mg daily to be unsuccessful.. No issues with urination. Denies dysuria and gross hematuria. He is having an upcoming inguinal hernia repair in December. He is also planning to have a cardiac ablation done for his A-fib, which was recently diagnosed. \par UA traced RBC\par IPSS 9\par XOCHITL 6\par JOSE 4\par Patient on medication for  HTN, closed angle glaucoma, cholesterol. NKMA, allergic to seafood.\par  The patient denies fevers, chills, nausea and or vomiting and no unexplained weight loss. \par All pertinent parts of the patient PFSH (past medical, family and social histories), laboratory, radiological studies and physician notes were reviewed prior to starting the face to face portion of the  visit. Questionnaire results were discussed with patient.\par We discussed further options for his ED. I recommend that he continue on the tadalafil 5 mg daily, and add sildenafil 100 mg PRN 1 hour before activity. However, given his newly diagnosed a-fib, we discussed waiting until after his hernia suegery and cardiac ablation to initiate this change. I advised him to get cleared by his cardiologist for this regimen. His BP was re-checked manually, 138/88 on BP meds. Regarding the trace RBC which was newly found today, found on UA dip today, we sent a UA micro, culture, and cytology for further evaluation. If all remains stable, he will follow up in 6 months after his inguinal hernia repair. \par C+S, cytology negative.\par \par Patient seen TODAY 7/12/2023 to reassess. He told me that no longer has nocturia but has noted urinary urgency with some hesitancy and double voiding over the last 2 months. No dysuria or gross hematuria.  He \par He also notes improvement in his ED on the combination therapy on tadalafil daily and sildenafil PRN. Not yet 100%, no side effects.  He requests refills of both.\par \par Since his last  visit he has had BIH surgery 12/16/2022, Cardiac ablation 1/13/2023, ORIF right wrist after traumatic fall 3/22/2023. \par UA negative\par IPSS 13\par XOCHITL 9\par JOSE 4\par

## 2023-07-12 NOTE — ASSESSMENT
[FreeTextEntry1] : We discussed the patient's shifting lower urinary tract symptoms and I recommended that we do some further studies at this time.  To this end of urine was sent for culture and cytology today and plans were made for him to return with a full bladder to perform uroflowmetry and have a pelvic ultrasound.  We were unable to draw blood for the PSA test today, he was given a slip to have this done at the phlebotomy lab.\par \par As for the ED, medications were renewed we discussed some tips to try and improve the results if he remains with difficulty on double medication, then we will consider penile duplex ultrasound with intracavernous injection therapy as the next step in evaluation and or treatment.  We will plan on follow-up based on the above results. Dasia Corrigan MD\par

## 2023-07-12 NOTE — PHYSICAL EXAM
[General Appearance - Well Developed] : well developed [Normal Appearance] : normal appearance [General Appearance - Well Nourished] : well nourished [Well Groomed] : well groomed [General Appearance - In No Acute Distress] : no acute distress [Abdomen Soft] : soft [Abdomen Tenderness] : non-tender [Costovertebral Angle Tenderness] : no ~M costovertebral angle tenderness [Urethral Meatus] : meatus normal [Urinary Bladder Findings] : the bladder was normal on palpation [Scrotum] : the scrotum was normal [Testes Tenderness] : no tenderness of the testes [Testes Mass (___cm)] : there were no testicular masses [Prostate Tenderness] : the prostate was not tender [No Prostate Nodules] : no prostate nodules [Prostate Size ___ (0-4)] : prostate size [unfilled] (scale: 0-4) [Edema] : no peripheral edema [] : no respiratory distress [Respiration, Rhythm And Depth] : normal respiratory rhythm and effort [Exaggerated Use Of Accessory Muscles For Inspiration] : no accessory muscle use [Oriented To Time, Place, And Person] : oriented to person, place, and time [Affect] : the affect was normal [Not Anxious] : not anxious [Mood] : the mood was normal

## 2023-07-13 LAB
BILIRUB UR QL STRIP: NORMAL
CLARITY UR: CLEAR
COLLECTION METHOD: NORMAL
GLUCOSE UR-MCNC: NORMAL
HCG UR QL: 0.2 EU/DL
HGB UR QL STRIP.AUTO: NORMAL
KETONES UR-MCNC: NORMAL
LEUKOCYTE ESTERASE UR QL STRIP: NORMAL
NITRITE UR QL STRIP: NORMAL
PH UR STRIP: 5.5
PROT UR STRIP-MCNC: NORMAL
SP GR UR STRIP: 1.03
URINE CYTOLOGY: NORMAL

## 2023-07-17 ENCOUNTER — APPOINTMENT (OUTPATIENT)
Dept: PHYSICAL MEDICINE AND REHAB | Facility: CLINIC | Age: 68
End: 2023-07-17
Payer: MEDICARE

## 2023-07-17 DIAGNOSIS — M75.81 OTHER SHOULDER LESIONS, RIGHT SHOULDER: ICD-10-CM

## 2023-07-17 PROCEDURE — 99443: CPT | Mod: 95

## 2023-07-18 ENCOUNTER — FORM ENCOUNTER (OUTPATIENT)
Age: 68
End: 2023-07-18

## 2023-07-18 ENCOUNTER — NON-APPOINTMENT (OUTPATIENT)
Age: 68
End: 2023-07-18

## 2023-07-19 ENCOUNTER — APPOINTMENT (OUTPATIENT)
Dept: CARDIOTHORACIC SURGERY | Facility: CLINIC | Age: 68
End: 2023-07-19

## 2023-07-19 ENCOUNTER — OUTPATIENT (OUTPATIENT)
Dept: OUTPATIENT SERVICES | Facility: HOSPITAL | Age: 68
LOS: 1 days | End: 2023-07-19
Payer: MEDICARE

## 2023-07-19 VITALS
HEIGHT: 70 IN | OXYGEN SATURATION: 98 % | DIASTOLIC BLOOD PRESSURE: 64 MMHG | HEART RATE: 59 BPM | TEMPERATURE: 96.3 F | SYSTOLIC BLOOD PRESSURE: 132 MMHG | RESPIRATION RATE: 18 BRPM | BODY MASS INDEX: 33.5 KG/M2 | WEIGHT: 234 LBS

## 2023-07-19 DIAGNOSIS — I71.21 ANEURYSM OF THE ASCENDING AORTA, WITHOUT RUPTURE: ICD-10-CM

## 2023-07-19 DIAGNOSIS — I35.0 NONRHEUMATIC AORTIC (VALVE) STENOSIS: ICD-10-CM

## 2023-07-19 DIAGNOSIS — Z93.8 OTHER ARTIFICIAL OPENING STATUS: Chronic | ICD-10-CM

## 2023-07-19 LAB — BACTERIA UR CULT: ABNORMAL

## 2023-07-19 PROCEDURE — 93306 TTE W/DOPPLER COMPLETE: CPT | Mod: 26

## 2023-07-19 PROCEDURE — 93306 TTE W/DOPPLER COMPLETE: CPT

## 2023-07-25 ENCOUNTER — RX RENEWAL (OUTPATIENT)
Age: 68
End: 2023-07-25

## 2023-07-26 ENCOUNTER — APPOINTMENT (OUTPATIENT)
Dept: OPHTHALMOLOGY | Facility: CLINIC | Age: 68
End: 2023-07-26
Payer: MEDICARE

## 2023-07-26 ENCOUNTER — NON-APPOINTMENT (OUTPATIENT)
Age: 68
End: 2023-07-26

## 2023-07-26 PROCEDURE — 92012 INTRM OPH EXAM EST PATIENT: CPT

## 2023-07-26 PROCEDURE — 92133 CPTRZD OPH DX IMG PST SGM ON: CPT

## 2023-07-26 PROCEDURE — 92083 EXTENDED VISUAL FIELD XM: CPT

## 2023-07-27 ENCOUNTER — APPOINTMENT (OUTPATIENT)
Dept: UROLOGY | Facility: CLINIC | Age: 68
End: 2023-07-27
Payer: MEDICARE

## 2023-07-27 VITALS — HEART RATE: 65 BPM | OXYGEN SATURATION: 96 % | DIASTOLIC BLOOD PRESSURE: 71 MMHG | SYSTOLIC BLOOD PRESSURE: 138 MMHG

## 2023-07-27 DIAGNOSIS — N39.0 URINARY TRACT INFECTION, SITE NOT SPECIFIED: ICD-10-CM

## 2023-07-27 DIAGNOSIS — N52.9 MALE ERECTILE DYSFUNCTION, UNSPECIFIED: ICD-10-CM

## 2023-07-27 PROCEDURE — 51741 ELECTRO-UROFLOWMETRY FIRST: CPT

## 2023-07-27 PROCEDURE — 99213 OFFICE O/P EST LOW 20 MIN: CPT

## 2023-07-27 PROCEDURE — 76857 US EXAM PELVIC LIMITED: CPT

## 2023-07-27 NOTE — ASSESSMENT
[FreeTextEntry1] : He is doing well urologically and no additional intervention is warranted for his stable LUTS. He will continue on the combination therapy for his ED and we will reassess in 3 months, in view of the satisfactory effect. As for the microhematuria, and a urine C+S and cytology are pending. He understands that this finding is likely residual from his recent UTI. We will also reasses the urine in 3 months. Dasia Corrigan MD\par

## 2023-07-27 NOTE — HISTORY OF PRESENT ILLNESS
[FreeTextEntry1] : 67 YO M seen  3/3/2022 as NPT for inguinal hernia. ED by history. He told me that he had a bilateral inguinal hernia repair by laparoscopy with mesh 12/2019. He also had a left lung decortication 2018 for pneumonia which progressed to empyema. He was caring for his invalid mother who passed last year. After that he noted difficulty achieving and maintaining erection ( for 6 - 8 months, may have been present longer). He has not tried any treatment yet. He has also noted mild right groin discomfort intermittently over the last 2 - 3 months. No problems with urination, Nocturia x 2 - 3. \par We discussed possible causes of his ED with low libido and I recommended that we check a full hormonal panel in an AM blood draw. We will also check the PSA at that time. We also discussed treatment of his ED with PDE5i medication and he will consider this option. We reviewed the indications, risks, alternatives and chances for success with this therapy. We will readdress this after we have the results of the blood work. Before prescribing any PDE5i treatment, we will clear this with his Cardiologist, Souleymane Bartholomew. \par PSA 0.56\par LH 6.4\par PRO 6.1\par E 2 23\par FSH 5.7\par TT not done \par \par Patient seen  4/7/2022 to review above. He continues to have sensitivity in the RLQ and right inguinal area. ED continues to be an issue for him.\par We discussed the patient's continued discomfort in the right inguinal area and right lower quadrant in view of his prior laparoscopic bilateral herniorrhaphy. I recommended that we assess this with a CT scan of the abdomen and pelvis with oral contrast only. He is amenable with this suggestion after we discussed the indications risks alternatives and chance for success and the CT scan is ordered today.\par As for the evaluation thus far for the patient's ED, I again discussed the use of PDE 5 on medication and recommended he try sildenafil citrate 50 to 100 mg per dose. Because of his cardiac issues and his glaucoma, I recommended that we discussed this with his cardiologist [place] and his ophthalmologist Dr. Matias santana. To that end, I sent messages via [employer] to each of the doctors and asked the patient to contact them also. After we receive confirmation that they have no issues with using that medication then I will order the medicine for him from [facility] as sildenafil 100 mg, 1/2 to 1 tab 1 hour before activity PRN daily x 30 x 3. In my assessment I do not see any problems with use of the medication at this point. \par \par CT scan 5/6/2022:\par IMPRESSION:\par Small fat-containing right inguinal hernia.\par Small to moderate size left inguinal hernia containing non-obstructed loop of small bowel.\par Enlarged prostate.\par \par Patient seen 7/8/2022 to discuss findings and treatment. Patient states he is doing well overall. He is using a hernia belt for his right sided discomfort, and his pain has improved greatly. He is hesitant to have another surgery, but is open to an evaluation by general surgeon. He is still experiencing difficulty with erections, and would like to try the sildenafil as he has now gotten cardiac clearance from Dr. Bartholomew and opthalmology clearance from Dr. Salcedo. He denies any issues with his urinary symptoms. \par \par UA: negative\par IPSS: 6\par XOCHITL: 12\par JOSE: 4 \par \par We discussed the findings of the CT scan which showed a right fat containing inguinal hernia and a left bowel containing inguinal hernia. Patient has had relief from hernia belt, however, we believe it would be best for him to evaluated by a general surgeon. He agrees with this plan. We discussed the signs and symptoms of an emergent situation, which include severe pain, nausea, vomiting, fever. If these occur, he understands he should report directly to ED. \par \par As for his erectile dysfunction, patient has cardiac and opthalmology clearance, so we wishes to try sildenafil. We prescribed sildenafil 100 mg 1/2-1 tab to be taken 1 hr before sexual activity on an empty stomach. It was sent to [facility], we informed him how to use the GoodRx coupon. We discussed goals and side effects of medication. \par \par He will follow up in 3 months for re-evaluation of ED, inguinal hernias.\par \par Patient seen  11/9/2022 to reassess his ED and inguinal hernias. He continues sildenafil 100 mg with no improvement with ED He also found the tadalafil 5 mg daily to be unsuccessful.. No issues with urination. Denies dysuria and gross hematuria. He is having an upcoming inguinal hernia repair in December. He is also planning to have a cardiac ablation done for his A-fib, which was recently diagnosed. \par UA traced RBC\par IPSS 9\par XOCHITL 6\par JOSE 4\par Patient on medication for  HTN, closed angle glaucoma, cholesterol. NKMA, allergic to seafood.\par  The patient denies fevers, chills, nausea and or vomiting and no unexplained weight loss. \par All pertinent parts of the patient PFSH (past medical, family and social histories), laboratory, radiological studies and physician notes were reviewed prior to starting the face to face portion of the  visit. Questionnaire results were discussed with patient.\par We discussed further options for his ED. I recommend that he continue on the tadalafil 5 mg daily, and add sildenafil 100 mg PRN 1 hour before activity. However, given his newly diagnosed a-fib, we discussed waiting until after his hernia suegery and cardiac ablation to initiate this change. I advised him to get cleared by his cardiologist for this regimen. His BP was re-checked manually, 138/88 on BP meds. Regarding the trace RBC which was newly found today, found on UA dip today, we sent a UA micro, culture, and cytology for further evaluation. If all remains stable, he will follow up in 6 months after his inguinal hernia repair. \par C+S, cytology negative.\par \par Patient seen TODAY 7/12/2023 to reassess. He told me that no longer has nocturia but has noted urinary urgency with some hesitancy and double voiding over the last 2 months. No dysuria or gross hematuria.  He \par He also notes improvement in his ED on the combination therapy on tadalafil daily and sildenafil PRN. Not yet 100%, no side effects.  He requests refills of both.\par Since his last  visit he has had BIH surgery 12/16/2022, Cardiac ablation 1/13/2023, ORIF right wrist after traumatic fall 3/22/2023. \par UA negative\par IPSS 13\par XOCHITL 9\par JOSE 4\par We discussed the patient's shifting lower urinary tract symptoms and I recommended that we do some further studies at this time. To this end of urine was sent for culture and cytology today and plans were made for him to return with a full bladder to perform uroflowmetry and have a pelvic ultrasound. We were unable to draw blood for the PSA test today, he was given a slip to have this done at the phlebotomy lab.\par As for the ED, medications were renewed we discussed some tips to try and improve the results if he remains with difficulty on double medication, then we will consider penile duplex ultrasound with intracavernous injection therapy as the next step in evaluation and or treatment. We will plan on follow-up based on the above results. \par \par Urine C+S >100,000 CFU/ml E. coli, -99,000 CFU/ml Enterococcus faecalis\par treated with 5-day course of Cipro\par \par Patient seen TODAY 7/27/2023 to reassess. He completed a 5-day course of Cipro for a recent UTI and reports resolution of irritative voiding symptoms. Regarding the ED, he continues on the combination therapy tadalafil 5mg and sildenafil 100 mg prn, with satisfactory response. \par \par UA trace RBC \par uroflow: avg rate 7.6 ml/s, max rate 15.5 ml/s (voided 223 cc)\par pelvic sono: PVR 16 cc, prostate 67 cc

## 2023-08-02 ENCOUNTER — NON-APPOINTMENT (OUTPATIENT)
Age: 68
End: 2023-08-02

## 2023-08-02 LAB — BACTERIA UR CULT: NORMAL

## 2023-08-03 ENCOUNTER — TRANSCRIPTION ENCOUNTER (OUTPATIENT)
Age: 68
End: 2023-08-03

## 2023-08-04 ENCOUNTER — TRANSCRIPTION ENCOUNTER (OUTPATIENT)
Age: 68
End: 2023-08-04

## 2023-08-21 ENCOUNTER — TRANSCRIPTION ENCOUNTER (OUTPATIENT)
Age: 68
End: 2023-08-21

## 2023-08-28 ENCOUNTER — APPOINTMENT (OUTPATIENT)
Dept: INTERNAL MEDICINE | Facility: CLINIC | Age: 68
End: 2023-08-28
Payer: MEDICARE

## 2023-08-28 DIAGNOSIS — G93.0 CEREBRAL CYSTS: ICD-10-CM

## 2023-08-28 DIAGNOSIS — M75.01 ADHESIVE CAPSULITIS OF RIGHT SHOULDER: ICD-10-CM

## 2023-08-28 PROCEDURE — 99443: CPT | Mod: 95

## 2023-08-29 ENCOUNTER — TRANSCRIPTION ENCOUNTER (OUTPATIENT)
Age: 68
End: 2023-08-29

## 2023-08-30 ENCOUNTER — TRANSCRIPTION ENCOUNTER (OUTPATIENT)
Age: 68
End: 2023-08-30

## 2023-09-05 ENCOUNTER — TRANSCRIPTION ENCOUNTER (OUTPATIENT)
Age: 68
End: 2023-09-05

## 2023-09-05 NOTE — ASSESSMENT
[FreeTextEntry1] : 69 yo male with hx empyema s/p VATS, L total lung decortication, HPL, afib s/p ablation, b/l inguinal hernias s/p repair, for TTM for f/u.  1) subarachnoid cyst - reviewed imaging.  Saw Dr. 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  2) shoulder pain - following with PM and R for shoulder, reviewed note, cw PT.   3) afib - s/p ablation, clinically much improved, f/u Dr. Pradhan at [facility] 4) thoracic aortic aneurysm - reviewed ECHO and MRA, stable x 6 years.  continue to follow with Dr. Figueroa for monitoring, c/w BP control and lifestyle modifications. 5) UTI - now resolved, reviewed labs, cx negative for growth. 6) hx fungal infections - renew clotrimazole

## 2023-09-05 NOTE — HISTORY OF PRESENT ILLNESS
[de-identified] : xxxx an established pt at this office reached out to this provider for xxxx. No recent visit within the last 7 days. A visit is not scheduled within the next 7 days at this time.   Discussed with patient: You have chosen to receive care through the use of tele-media. Tele-media enables health care providers at different locations to provide safe, effective, and convenient care through the use of technology. Please note this is a billable encounter. As with any health care service, there are risks associated with the use of tele-media, including equipment failure, poor image and/or resolution, and  issues. You understand that I cannot physically examine you and that you may need to come to the clinic to complete the assessment. Patient agreed verbally to understanding the risks and benefits of tele-media as explained. All questions regarding tele-media encounters were answered.   Returned phone call to patient to review complaint of xxxx  Total time spent with patient on the phone is 30 min   69 yo male with hx empyema s/p VATS, L total lung decortication, HPL, afib s/p ablation, b/l inguinal hernias s/p repair, for TTM for f/u. He sees Dr. 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  He is currently following with PM and R for shoulder, doing PT.   He has had very good results following ablation for afib with Dr Pradhan.  He has had no further episodes afib.  Reports his sleep and breathing are both significantly better. Also reports AAA stable, had ECHO and MRA, follows with Dr. Figueroa Had recent UTI, resolved with abx He is also requesting refills clotrimazole

## 2023-09-11 ENCOUNTER — RX RENEWAL (OUTPATIENT)
Age: 68
End: 2023-09-11

## 2023-09-14 ENCOUNTER — APPOINTMENT (OUTPATIENT)
Dept: PHYSICAL MEDICINE AND REHAB | Facility: CLINIC | Age: 68
End: 2023-09-14
Payer: MEDICARE

## 2023-09-14 VITALS
HEIGHT: 70 IN | SYSTOLIC BLOOD PRESSURE: 160 MMHG | TEMPERATURE: 98.1 F | HEART RATE: 70 BPM | DIASTOLIC BLOOD PRESSURE: 80 MMHG | OXYGEN SATURATION: 97 % | WEIGHT: 234 LBS | BODY MASS INDEX: 33.5 KG/M2

## 2023-09-14 DIAGNOSIS — G25.89 OTHER SPECIFIED EXTRAPYRAMIDAL AND MOVEMENT DISORDERS: ICD-10-CM

## 2023-09-14 DIAGNOSIS — I71.21 ANEURYSM OF THE ASCENDING AORTA, WITHOUT RUPTURE: ICD-10-CM

## 2023-09-14 DIAGNOSIS — M25.611 STIFFNESS OF RIGHT SHOULDER, NOT ELSEWHERE CLASSIFIED: ICD-10-CM

## 2023-09-14 PROCEDURE — 99215 OFFICE O/P EST HI 40 MIN: CPT

## 2023-10-31 ENCOUNTER — RX RENEWAL (OUTPATIENT)
Age: 68
End: 2023-10-31

## 2023-10-31 ENCOUNTER — APPOINTMENT (OUTPATIENT)
Dept: UROLOGY | Facility: CLINIC | Age: 68
End: 2023-10-31
Payer: MEDICARE

## 2023-10-31 VITALS — SYSTOLIC BLOOD PRESSURE: 138 MMHG | HEART RATE: 62 BPM | DIASTOLIC BLOOD PRESSURE: 72 MMHG | OXYGEN SATURATION: 96 %

## 2023-10-31 DIAGNOSIS — R39.15 URGENCY OF URINATION: ICD-10-CM

## 2023-10-31 LAB
BILIRUB UR QL STRIP: NORMAL
CLARITY UR: CLEAR
COLLECTION METHOD: NORMAL
GLUCOSE UR-MCNC: NORMAL
HCG UR QL: 0.2 EU/DL
HGB UR QL STRIP.AUTO: ABNORMAL
KETONES UR-MCNC: NORMAL
LEUKOCYTE ESTERASE UR QL STRIP: NORMAL
NITRITE UR QL STRIP: NORMAL
PH UR STRIP: 6.5
PROT UR STRIP-MCNC: NORMAL
SP GR UR STRIP: 1.01

## 2023-10-31 PROCEDURE — 99214 OFFICE O/P EST MOD 30 MIN: CPT

## 2023-10-31 PROCEDURE — 51798 US URINE CAPACITY MEASURE: CPT

## 2023-10-31 PROCEDURE — 51741 ELECTRO-UROFLOWMETRY FIRST: CPT

## 2023-11-02 LAB — BACTERIA UR CULT: NORMAL

## 2023-11-09 ENCOUNTER — APPOINTMENT (OUTPATIENT)
Dept: OPHTHALMOLOGY | Facility: CLINIC | Age: 68
End: 2023-11-09
Payer: MEDICARE

## 2023-11-09 ENCOUNTER — NON-APPOINTMENT (OUTPATIENT)
Age: 68
End: 2023-11-09

## 2023-11-09 PROCEDURE — 92250 FUNDUS PHOTOGRAPHY W/I&R: CPT

## 2023-11-09 PROCEDURE — 92020 GONIOSCOPY: CPT

## 2023-11-09 PROCEDURE — 92083 EXTENDED VISUAL FIELD XM: CPT

## 2023-11-09 PROCEDURE — 92014 COMPRE OPH EXAM EST PT 1/>: CPT

## 2023-11-14 ENCOUNTER — NON-APPOINTMENT (OUTPATIENT)
Age: 68
End: 2023-11-14

## 2023-11-16 ENCOUNTER — TRANSCRIPTION ENCOUNTER (OUTPATIENT)
Age: 68
End: 2023-11-16

## 2023-11-28 ENCOUNTER — TRANSCRIPTION ENCOUNTER (OUTPATIENT)
Age: 68
End: 2023-11-28

## 2023-11-28 DIAGNOSIS — Z11.59 ENCOUNTER FOR SCREENING FOR OTHER VIRAL DISEASES: ICD-10-CM

## 2023-12-01 ENCOUNTER — APPOINTMENT (OUTPATIENT)
Dept: UROLOGY | Facility: CLINIC | Age: 68
End: 2023-12-01
Payer: MEDICARE

## 2023-12-01 PROCEDURE — 93980 PENILE VASCULAR STUDY: CPT

## 2023-12-01 PROCEDURE — J3490T: CUSTOM

## 2023-12-01 PROCEDURE — 54235 NJX CORPORA CAVERNOSA RX AGT: CPT

## 2023-12-14 ENCOUNTER — APPOINTMENT (OUTPATIENT)
Dept: PHYSICAL MEDICINE AND REHAB | Facility: CLINIC | Age: 68
End: 2023-12-14
Payer: MEDICARE

## 2023-12-14 VITALS
SYSTOLIC BLOOD PRESSURE: 145 MMHG | HEART RATE: 66 BPM | TEMPERATURE: 98 F | DIASTOLIC BLOOD PRESSURE: 75 MMHG | WEIGHT: 234 LBS | BODY MASS INDEX: 33.5 KG/M2 | OXYGEN SATURATION: 95 % | HEIGHT: 70 IN

## 2023-12-14 DIAGNOSIS — M79.18 MYALGIA, OTHER SITE: ICD-10-CM

## 2023-12-14 DIAGNOSIS — M62.838 OTHER MUSCLE SPASM: ICD-10-CM

## 2023-12-14 DIAGNOSIS — M47.812 SPONDYLOSIS W/OUT MYELOPATHY OR RADICULOPATHY, CERVICAL REGION: ICD-10-CM

## 2023-12-14 PROCEDURE — 99215 OFFICE O/P EST HI 40 MIN: CPT

## 2023-12-27 NOTE — HISTORY OF PRESENT ILLNESS
[FreeTextEntry1] : Byron Rain M.D. Sports Medicine and Interventional Spine Department of Physical Medicine and Rehabilitation  [facility] [address] [facility], [address]th [place] [address]  [facility] Physician [facility] [address] [place] [address]  [facility] at  [facility] [address], [address] [place] [address]  For [place] Appointments Phone: [phone] Fax: [phone]  ----------------------------------------------------------------------------------------------------------------------------------------  PATIENT: OTM PATEL  MRN: [id number]  YOB: 1955  DATE OF SERVICE: 12/14/2023  Dec 14, 2023   Dear DrsChey  Thank you for referring TOM PATEL to my Sports and Interventional Spine practice and office. Enclosed is a copy of the patient's consultation/progress note, which includes my complete assessment and recent studies completed during the patient's evaluation.  If you have questions or have any patients who require nonsurgical, non-opiate management of any sports, spine, or musculoskeletal conditions, please do not hesitate to contact my , Rosey Munson at [phone].  I look forward to taking care of your patients along with you.  Sincerely,  Byron Rain MD Sports, Interventional Spine, & Regenerative Musculoskeletal Medicine [facility]                                                    Follow Up Visit CC: pain, arm  HPI:  This is the first visit to [facility] Sports Medicine and Interventional Spine Practice.    TOM PATEL presents with the chief complaint as above.    Interval Hx on Dec 14, 2023: presents for follow up. Since last visit, they completed PT with their right shoulder, last sessions 12/7/2023, attended PT for 1-2 times per week. patient has developed further HEP at home, patient has been able to continue many treatments from their PT at home. patient has not been able to swimming. They have intermittent righ inguinal pain, not particularly worsened. patient believes their low back has acutely worsened as well, having difficulty/pain with rotation while lifting items ("possible"), experiences paroxysm of low back pain "punch" of pain intermittently. patient is interested in having similar outcome as their shoulder over the cervical region. patient also notes chronic spine issues among their relatives and ancestors. Patient is interested in optimizing their functional use of their limbs and maximize their range of motion. Patient is interested in continuing their walking program, patient aims for 2.5 miles.  Since the last visit, they relate improvements in pain previously associated with known exacerbating, aggravating factors and situations. Pharmacologic treatments now include OTC analgesics PRN, but otherwise pharmacologic treatments are minimal. Denies new or worsened numbness, tingling, or focal motor deficit. Denies interval fall, accident, or injury. Denies change in bowel or bladder functioning.  Meds: denies regular PO pain medications besides PRN tylenol Therapy Program: no recent structured targeted therapy program HEP: doing HEP regularly  Assoc Sx: Denies numbness, Tingling No indication of myelopathic type symptoms Denies Focal motor weakness in the upper or lower limbs Denies New or worsened bowel or bladder incontinence Denies Saddle anesthesia Denies Using Orthotic(s)/Supportive devices They also deny frequent tripping, falling  ROS: A 14 point review of systems was completed. Positive findings are pain as described above. The remaining systems negative.  Prostate Hx: up to date COVID HX: reviewed  Interval Hx on Sep 14, 2023: presents for follow up. Since last visit, their pain and functioning are overall signficantly better. patient has been having marked relief with topical compound cream. Patient was able to continue with therapy treatments, had to change locations, switched over the [facility] doing OT and PT treatments for the past three months. patient offers that their ROM is markedly improved as well in the shoulder and low back. Since the last visit, they relate significant improvements in pain previously associated with known exacerbating, aggravating factors and situations. Pharmacologic treatments now include OTC analgesics PRN, but otherwise pharmacologic treatments are minimal. Denies new or worsened numbness, tingling, or focal motor deficit. Denies interval fall, accident, or injury. Denies change in bowel or bladder functioning. Patient has been able to maintain their walking program, has been ranging between 2.5 miles to 4 miles. patient has walking path, [place] home. Patient believes their historic challenge with stairs continues, attributes their shortness of breath and their dyspnea to their cardiac condition. Their OT for the right upper limb, specifically for the hand/fingers seems to be lagging behind. Of note, patient had been cleared in 7/2023 by CT Surgery and Orthopedic Surgery for the weight bearing status of the hand.   Interval Hx on Jul 17, 2023: presents for follow up. Since last visit, they underwent further diagnostic workup including additional imaging studies. 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 There have been no significant changes to their aggravating or alleviating factors since the last visit. Patient believes they have been cleared from Orthopedic Surgery standpoint for further weight bearing on the upper limb. Patient intends to have follow up with CT Surgery and Cardiology in order to proceed with PT. Pharmacologic treatments now include OTC analgesics PRN, otherwise pharmacologic treatments are minimal. Denies new or worsened numbness, tingling, or focal motor deficit. Denies interval fall, accident, or injury. Denies change in bowel or bladder functioning. Despite their fall with facial fractures in 3/2023, their cervical degenerative changes have not acutely worsened. Patient has started topical compounds from [employer], helping significantly with their pain and their range of motion.  Initial Hx on 06/29/2023: Presents in [facility] to [facility]. patient relates having been under treatment for cervical spondylosis with Dr. KVNG Reis (Pain). minimal pain over the cervical region, now presenting with more pain over the right shoulder. 3/2023s/p fall, outdoors, trip and fall, fell on their right side, with right facial fractures, s/p right dominant wrist ORIF. The patient's difficulties began years ago, had been under treatments; right shoulder pain worsened after fall. he pain is graded as moderate over the right shoulder. points to the right anterior shoulder. The pain is described as throbbing when it wakes patient from the sleep; reaching forward. The pain is intermittently severe positionally worse (OH). The pain does not radiate, located over the right shoulder. The patient feels that the pain is overall persistent. Patient denies other recent fall, MVA, injury, trauma, or accident besides presenting history above. Aggravating: forward flexion of the right shoulder, abduction of the right shoulder, overhead activity, lifting heavy items. Alleviating: rest, activity modification, avoiding overhead activity, pharmacologic treatments (methocarbamol)  Assoc Hx:' walks 2.5 miles per day Ambulates without assistive device Injection Hx: denies locally directed treatment to the area in question Imaging Hx: reviewed  Level of functioning: indep with ambulation, indep with ADLs Living Situation: dwelling with steps to enter

## 2023-12-27 NOTE — PHYSICAL EXAM
[FreeTextEntry1] : GEN: NAD, well dressed, well nourished HEENT: NCAT, oropharynx clear CV: S1S2, RRR RESP: on room air, no labored breathing ABD: non distended EXT: well perfused, no calf tenderness  RIGHT SHOULDER: neg effusion neg scars or lacerations or lesions neg increased warmth neg scapular winging neg muscle atrophy  Palpation: no TTP over sterna-clavicular joint no TTP over clavicle no TTP over coracoid process no TTP over sternum now 12/14/23 minimal TTP over AC joint no TTP over humerus no TTP over the spine of the scapula no TTP over the medial border of the scapula, superior angle, inferior angle, lateral border now 12/14/23 minimal TTP over supraspinatus now 12/14/23 minimal TTP over infraspinatus now 12/14/23 minimal  TTP over upper trapezius now 12/14/23 minimal TTP over deltoid muscle no TTP in the axilla neg crepitus with PROM shoulder  AROM: active range of motion limited in planes as below scapulohumeral rhythm is now smooth, appropriate   Abduction 170/170-180 Forward Flexion 160/160-180 Elevation through the plane of the scapula 160/170-180 External Rotation 70/80-90 Internal Rotation 45/ Extension 45/50-60 Adduction 45/50-75  PROM consistent with above  neg sulcus sign neg Neer test neg Coffey test neg Fani's test neg Speed's test neg Yergason's test neg drop arm test neg empty can test neg Suwannee's Test neg external rotation lag sign neg lift off sign neg hornblower's sign neg Horizontal adduction test  Sensation to light touch intact over all dermatomes about the shoulder Distal pulses present  LEFT SHOULDER: neg effusion neg scars or lacerations or lesions neg increased warmth neg scapular winging neg muscle atrophy  Palpation: no TTP over sterna-clavicular joint no TTP over clavicle no TTP over coracoid process no TTP over sternum no TTP over AC joint no TTP over humerus no TTP over the spine of the scapula no TTP over the medial border of the scapula, superior angle, inferior angle, lateral border no TTP over supraspinatus no TTP over infraspinatus no TTP over upper trapezius no TTP over deltoid muscle no TTP in the axilla neg crepitus with PROM shoulder  AROM: active range of motion full and painless scapulohumeral rhythm was smooth, appropriate  PROM consistent with above  neg sulcus sign neg Neer test neg Coffey test neg Fani's test neg Speed's test neg Yergason's test neg drop arm test neg empty can test neg Suwannee's Test neg external rotation lag sign neg lift off sign neg hornblower's sign neg Horizontal adduction test  Sensation to light touch intact over all dermatomes about the shoulder Distal pulses present  Manual Muscle Testing   	C5 (Shoulder Abduction)        C5 (Elbow Flex)	        C6 (Wrist Ext)    Right	5-/5	                                5/5	                          5/5	        Left	5/5	                                5/5	                          5/5	            	C7 (Elbow Ext)            C7 (Wrist Flex)             C8 (Long Finger Flex)          T1 (Finger Abduct)            Right	5/5	                    5/5                                      5/5	                                      5/5	                                                  Left	5/5	                    5/5                                      5/5	                                      5/5	                                 	C8 (Thumb Ext)            C8 & T1 (Thumb Opp)             Right	5/5	                           5/5	                                                  Left	5/5	                           5/5                               Resisted Internal Rotation Right 5-/5 Left 5-/5  Resisted External Rotation Right 5-/5 Left 5-/5  Gait: Non antalgic  +  reciprocating heel to toe able to stand on toes and heels WITH hand holding Tandem gait intact WITH hand holding Poor single leg standing balance B/L Romberg negative  Inspection: Spine alignment is midline, with no evidence of scoliosis. Iliac crest heights and PSIS heights level.  + Forward head position.  Palpation: There is now 12/14/23 minimal tenderness over the upper traps, middle traps, levator scaps, rhomboids, articular pillars, cervical paraspinals  Cervical ROM: Flexion, extension, side-bending, rotation, limited due to pain with most pain at terminal ROM  Finger to nose bilaterally intact  Tone: Normal. No cog wheeling.  Proprioception at DIPs intact B/L Sensation: Grossly intact to light touch and pinprick bilateral upper extremities. Reflexes: 2+ symmetric biceps, pronators, brachioradialis, triceps Guy's Sign negative Spurling's Sign negative Shoulder Abduction Test (Bakody's) absent Lhermitte's Sign negative  decreased 5th digit MCP flexion

## 2023-12-27 NOTE — DATA REVIEWED
[MRI] : MRI [FreeTextEntry1] : EXAM: [id number] - MR SPINE CERVICAL  - ORDERED BY: ASHLEIGH YEUNG PROCEDURE DATE:  06/29/2023 INTERPRETATION:  CERVICAL SPINE MRI CLINICAL INFORMATION: Neck pain. Radiculopathy. TECHNIQUE: Multiplanar, multisequence MRI was obtained of the cervical spine. Comparison is made to 6/10/2021 FINDINGS: DISC LEVEL EVALUATION: Multilevel anterior bridging osteophyte formation is present. C1/2: Moderate arthrosis between the dens and the anterior arch of C1. C2/C3: Fusion of the left-sided facet joint. C3/C4: No spinal canal or foraminal narrowing. C4/C5: Small posterior disc protrusion. Thickening of the posterior longitudinal ligament with ossification within the ligament extending from the level of the C4-C5 disc space through the C6-C7 disc space. The level of the C5 vertebral body this results in indentation upon the central and left aspect of the cord without cord signal abnormality. C5/C6: Moderate-sized posterior disc protrusion along with ossification of the posterior longitudinal ligament resulting in disc and ossified ligament contacting and indenting the left aspect of the cord. C6/C7: Moderate disc height loss with a moderate-sized posterior disc protrusion which along with ossified posterior longitudinal ligament results in indentation of the right aspect of the cord C7/T1: Mild disc height loss with mild to moderate disc bulging and osteophyte formation. Mild foraminal narrowing.  Mild partially imaged multilevel thoracic degenerative disc disease.  ALIGNMENT: Straightening of the normal cervical lordosis. CORD: No cord signal abnormality. MARROW: No bone marrow edema or fracture. IMAGED BRAIN: Normal PERIPHERAL/NECK SOFT TISSUES: Cystic lesion within the left parotid gland with fluid fluid levels that is unchanged compared to the prior study.  IMPRESSION: No change in the appearance of DISH and OPLL with degenerative disc disease resulting in spinal stenosis at C5-C6 and C6-C7 without cord signal abnormality.  --- End of Report ---

## 2023-12-27 NOTE — ASSESSMENT
[FreeTextEntry1] : Assessment/Plan:  TOM PATEL is a 68 year male with right here for follow up  Rotator cuff tendinitis, right Adhesive capsulitis, right Spasm of the cervical paraspinous muscles Rhomboid Strain, right Scapular dyskinetic, type II, right  DISH (cervical) Cervical spondylosis (ossification of the posterior longitudinal ligament at C5/6, C6/7) Chronic cervical radiculopathy, C5, C6, right Cervicalgia  H/O AAA H/O atrial fibrillation (s/p ablation, on eliquis) H/O HTN  S/p fall with facial fracture (3/2023)  - Tiers of treatment and management of above diagnosis(es) were discussed with patient - Optimal diet, weight, sleep, and lifestyle management to minimize stress and maximize well being counseling provided - Imaging reviewed and discussed with patient - Reviewed previous encounter notes from 11/29/2021 Dr. KVNG Reis (Pain) - Patient should COMPLETE their structured, targeted therapy program 2-3x/wk for 8 wks with goal toward HEP - Patient was educated on an appropriate home exercise program, provided with exercise recommendations, all questions answered - Patient may trial acetaminophen 1000mg up to TID PRN moderate to severe pain and to decrease average consumption of NSAIDs - Patient may continue to trial topical compound cream to the low back no more than twice per day as needed for next 2-3 weeks, Rx entered via portal, written information provided to the patient in addition to verbal explanation regarding the medication - encouraged patient to review trigger point injection information materials - Patient was advised to apply cool compresses or warm heat to affected regions PRN - Radiographs of right shoulder, scapula, thoracic reviewed 7/2023 via telemedicine  - Educated about red flag symptoms including (but not limited to) new, worsened, or persistent: fever greater than 100F, bowel or bladder incontinence, bowel obstipation, inability to void urine, urinary leakage, Severe nausea or vomiting, Worsening numbness, worsening tingling/paresthesias, and/or new or progressive motor weakness; advised to seek immediate medical attention at his nearest Emergency department should they experience any of the above  - Follow up in 3 months in person to assess progress with topical compound for the low back, assess need for trigger point injections, local treatment of the axial spine.  I have personally spent a total of at least 45 minutes preparing, reviewing internal and external records, explaining, counseling, and coordinating care for this patient encounter.  Thank you, Dr(s), for allowing me to participate in the care of your patient. Please do not hesitate to contact me with questions/concerns.  Byron Rain M.D. Sports and Interventional Spine Department of Physical Medicine and Rehabilitation [facility] [address], [address] [place] [address]  Appointments: [phone] Fax: [phone]

## 2024-01-11 ENCOUNTER — APPOINTMENT (OUTPATIENT)
Dept: UROLOGY | Facility: CLINIC | Age: 69
End: 2024-01-11

## 2024-01-11 LAB
BILIRUB UR QL STRIP: NORMAL
CLARITY UR: CLEAR
COLLECTION METHOD: NORMAL
GLUCOSE UR-MCNC: NORMAL
HCG UR QL: NORMAL EU/DL
HGB UR QL STRIP.AUTO: ABNORMAL
KETONES UR-MCNC: NORMAL
LEUKOCYTE ESTERASE UR QL STRIP: NORMAL
NITRITE UR QL STRIP: NORMAL
PH UR STRIP: 7
PROT UR STRIP-MCNC: NORMAL
SP GR UR STRIP: 1.02

## 2024-01-11 NOTE — HISTORY OF PRESENT ILLNESS
[FreeTextEntry1] : 67 YO M seen  3/3/2022 as NPT for inguinal hernia. ED by history. He told me that he had a bilateral inguinal hernia repair by laparoscopy with mesh 12/2019. He also had a left lung decortication 2018 for pneumonia which progressed to empyema. He was caring for his invalid mother who passed last year. After that he noted difficulty achieving and maintaining erection ( for 6 - 8 months, may have been present longer). He has not tried any treatment yet. He has also noted mild right groin discomfort intermittently over the last 2 - 3 months. No problems with urination, Nocturia x 2 - 3.  We discussed possible causes of his ED with low libido and I recommended that we check a full hormonal panel in an AM blood draw. We will also check the PSA at that time. We also discussed treatment of his ED with PDE5i medication and he will consider this option. We reviewed the indications, risks, alternatives and chances for success with this therapy. We will readdress this after we have the results of the blood work. Before prescribing any PDE5i treatment, we will clear this with his Cardiologist, Souleymane Bartholomew.  PSA 0.56 LH 6.4 PRO 6.1 E 2 23 FSH 5.7 TT not done   Patient seen  4/7/2022 to review above. He continues to have sensitivity in the RLQ and right inguinal area. ED continues to be an issue for him. We discussed the patient's continued discomfort in the right inguinal area and right lower quadrant in view of his prior laparoscopic bilateral herniorrhaphy. I recommended that we assess this with a CT scan of the abdomen and pelvis with oral contrast only. He is amenable with this suggestion after we discussed the indications risks alternatives and chance for success and the CT scan is ordered today. As for the evaluation thus far for the patient's ED, I again discussed the use of PDE 5 on medication and recommended he try sildenafil citrate 50 to 100 mg per dose. Because of his cardiac issues and his glaucoma, I recommended that we discussed this with his cardiologist [place] and his ophthalmologist Dr. Matias santana. To that end, I sent messages via [facility] [employer] to each of the doctors and asked the patient to contact them also. After we receive confirmation that they have no issues with using that medication then I will order the medicine for him from [facility] as sildenafil 100 mg, 1/2 to 1 tab 1 hour before activity PRN daily x 30 x 3. In my assessment I do not see any problems with use of the medication at this point.   CT scan 5/6/2022: IMPRESSION: Small fat-containing right inguinal hernia. Small to moderate size left inguinal hernia containing non-obstructed loop of small bowel. Enlarged prostate.  Patient seen 7/8/2022 to discuss findings and treatment. Patient states he is doing well overall. He is using a hernia belt for his right sided discomfort, and his pain has improved greatly. He is hesitant to have another surgery, but is open to an evaluation by general surgeon. He is still experiencing difficulty with erections, and would like to try the sildenafil as he has now gotten cardiac clearance from Dr. Bartholomew and opthalmology clearance from Dr. Salcedo. He denies any issues with his urinary symptoms.  UA: negative IPSS: 6 XOCHITL: 12 JOSE: 4  We discussed the findings of the CT scan which showed a right fat containing inguinal hernia and a left bowel containing inguinal hernia. Patient has had relief from hernia belt, however, we believe it would be best for him to evaluated by a general surgeon. He agrees with this plan. We discussed the signs and symptoms of an emergent situation, which include severe pain, nausea, vomiting, fever. If these occur, he understands he should report directly to ED.  As for his erectile dysfunction, patient has cardiac and opthalmology clearance, so we wishes to try sildenafil. We prescribed sildenafil 100 mg 1/2-1 tab to be taken 1 hr before sexual activity on an empty stomach. It was sent to [facility], we informed him how to use the GoodRx coupon. We discussed goals and side effects of medication.  He will follow up in 3 months for re-evaluation of ED, inguinal hernias.  Patient seen  11/9/2022 to reassess his ED and inguinal hernias. He continues sildenafil 100 mg with no improvement with ED He also found the tadalafil 5 mg daily to be unsuccessful.. No issues with urination. Denies dysuria and gross hematuria. He is having an upcoming inguinal hernia repair in December. He is also planning to have a cardiac ablation done for his A-fib, which was recently diagnosed.  UA traced RBC IPSS 9 XOCHITL 6 JOSE 4 Patient on medication for  HTN, closed angle glaucoma, cholesterol. NKMA, allergic to seafood.  The patient denies fevers, chills, nausea and or vomiting and no unexplained weight loss.  All pertinent parts of the patient PFSH (past medical, family and social histories), laboratory, radiological studies and physician notes were reviewed prior to starting the face to face portion of the  visit. Questionnaire results were discussed with patient. We discussed further options for his ED. I recommend that he continue on the tadalafil 5 mg daily, and add sildenafil 100 mg PRN 1 hour before activity. However, given his newly diagnosed a-fib, we discussed waiting until after his hernia suegery and cardiac ablation to initiate this change. I advised him to get cleared by his cardiologist for this regimen. His BP was re-checked manually, 138/88 on BP meds. Regarding the trace RBC which was newly found today, found on UA dip today, we sent a UA micro, culture, and cytology for further evaluation. If all remains stable, he will follow up in 6 months after his inguinal hernia repair.  C+S, cytology negative.  Patient seen  7/12/2023 to reassess. He told me that no longer has nocturia but has noted urinary urgency with some hesitancy and double voiding over the last 2 months. No dysuria or gross hematuria.  He  He also notes improvement in his ED on the combination therapy on tadalafil daily and sildenafil PRN. Not yet 100%, no side effects.  He requests refills of both. Since his last  visit he has had BIH surgery 12/16/2022, Cardiac ablation 1/13/2023, ORIF right wrist after traumatic fall 3/22/2023.  UA negative IPSS 13 XOCHITL 9 JOSE 4 We discussed the patient's shifting lower urinary tract symptoms and I recommended that we do some further studies at this time. To this end of urine was sent for culture and cytology today and plans were made for him to return with a full bladder to perform uroflowmetry and have a pelvic ultrasound. We were unable to draw blood for the PSA test today, he was given a slip to have this done at the phlebotomy lab. As for the ED, medications were renewed we discussed some tips to try and improve the results if he remains with difficulty on double medication, then we will consider penile duplex ultrasound with intracavernous injection therapy as the next step in evaluation and or treatment. We will plan on follow-up based on the above results.  Urine C+S >100,000 CFU/ml E. coli, -99,000 CFU/ml Enterococcus faecalis treated with 5-day course of Cipro PSA 0.72  Patient seen 7/27/2023 to reassess. He completed a 5-day course of Cipro for a recent UTI and reports resolution of irritative voiding symptoms. Regarding the ED, he continues on the combination therapy tadalafil 5mg and sildenafil 100 mg prn, with satisfactory response. UA trace RBC uroflow: avg rate 7.6 ml/s, max rate 15.5 ml/s (voided 223 cc) pelvic sono: PVR 16 cc, prostate 67 cc. He is doing well urologically and no additional intervention is warranted for his stable LUTS. He will continue on the combination therapy for his ED and we will reassess in 3 months, in view of the satisfactory effect. As for the microhematuria, and a urine C+S and cytology are pending. He understands that this finding is likely residual from his recent UTI. We will also reassess the urine in 3 months.   Patient seen 10/31/2023 to reassess. He told me that since taking the Cipro he notes that his urination is better and he has less nocturia. He still has issues with ED and is not able to function satisfactorily even with the daily tadalafil 5 mg and the sildenafil 100 mt PRN taken 1 hour before sexual activity. He requests further evaluation/treatment for this problem. He remains under the care of his cardiologist ad has F/U visits scheduled after his ablation earlier this year. UA trace RBC IPSS 4 Uroflow good: Vol 318 ml, V Max 19 ml/s, V Ave 11 ml/s. PVR 6 ml. Patient's lower urinary tract symptoms remain stable and no further intervention is indicated at this time. As for his ED, we discussed risk factors and I recommended continue with cardiac evaluation and treatment as he plans. We also discussed next steps in evaluation from the urologic standpoint and I recommended that he consider duplex ultrasound examination of the penis with intracavernous injection stimulation. We reviewed the indications risks alternatives and chances for success with this test and the need to stop all other oral PDE 5 RI medication 1 week prior to the testing. He demonstrated interest in proceeding with this test. Medication was ordered and plans were made. C+S negative.  Patient seen 12/1/2023 for DUS with TRI/STD 0.3 ml due to cardiac history.  Penile DUS: Baseline: Left: Concepción 0.2 mm Right: Concepción 0.3 mm 5 min: Total of 0.5 cc Trimix Left: Concepción 0.9 mm PSV 37.3 cm/s EDV 7.5 cm/s Ri 0.80 Right: Concepción 0.7 mm PSV 26.6 cm/s EDV 8.5 cm/s Ri 0.68 15 Min: Total of 0.5 cc Trimix Left: Concepción 0.8 mm PSV 28.4 cm/s EDV 7.5 cm/s Ri 0.74 Right: Concepción 0.8 mm PSV 25.9 cm/s EDV 7.5 cm/s Ri 0.71 30 min: Additional 0.5 cc Trimix injected for a total of 1.0 cc Trimix Left: Concepción 0.7 mm PSV 37.3 cm/s EDV 7.9 cm/s Ri 0.79 Right: Concepción 0.8 mm PSV 37.1 cm/s EDV 9.4 cm/s Ri 0.75 40 min: Total of 1.0 cc Trimix Left: Concepción 0.9 mm PSV 40.0 cm/s EDV 7.7 cm/s Ri 0.81 Right: Concepción 0.8 mm PSV 40.7 cm/s EDV 8.7 cm/s Ri 0.79 50 min: Additional 0.5 cc Trimix injected for a total of 1.5 cc Trimix Left: Concepción 0.9 mm PSV 36.9 cm/s EDV 7.7 cm/s Ri 0.79 Right: Concepción 0.8 mm PSV 38.4 cm/s EDV 9.8 cm/s Ri 0.75 Patient did not achieve rigidity after 1.5 ml f TRI/STD in increments.  He was observed for 30 minutes after last ICI and no further rigidity resulted.  Findings consistent with CVOD,  Findings today from the duplex ultrasound are consistent with a combination of possible arterial insufficiency along with corporal veno-occlusive dysfunction (CVOD). We reviewed the 4-hour rule for a prolonged recurrent erection which is unlikely in this situation along with what to do if it should occur. Options at this point would be either implantation of an IPP, use of the ERIC, or attempt at a trial with of ICI with observation using a stronger medication mixture. The patient is not interested in an IPP due to his concurrent cardiac issues. He is concerned over surgery for this. As for the ERIC and the higher dose of IPP, he would like to explore these further. We will order TRI/CUST #1 (30/1/10) to try with observation at 0.3 ml/injection. While a higher dose would typically be used with this diagnosis, we will start with this lower dose due to his concurrent CAD. We discussed the indications, risks, alternatives and chances for success with this approach in his situation.  Patient seen TODAY 1/11/2024 to reassess his ED with Trial with observation using TRI/CUST #1 at 0.3 ml for CVOD with underlying CAD. Medicine TRI/CUST #1 was not received in the office, appointment cancelled for today, medication reordered and staff will call patient when it arrives to reschedule.

## 2024-01-16 ENCOUNTER — RX RENEWAL (OUTPATIENT)
Age: 69
End: 2024-01-16

## 2024-01-24 ENCOUNTER — NON-APPOINTMENT (OUTPATIENT)
Age: 69
End: 2024-01-24

## 2024-01-24 ENCOUNTER — TRANSCRIPTION ENCOUNTER (OUTPATIENT)
Age: 69
End: 2024-01-24

## 2024-02-02 ENCOUNTER — APPOINTMENT (OUTPATIENT)
Dept: UROLOGY | Facility: CLINIC | Age: 69
End: 2024-02-02
Payer: MEDICARE

## 2024-02-02 VITALS
BODY MASS INDEX: 32.07 KG/M2 | WEIGHT: 224 LBS | SYSTOLIC BLOOD PRESSURE: 161 MMHG | TEMPERATURE: 97.88 F | HEART RATE: 77 BPM | HEIGHT: 70 IN | DIASTOLIC BLOOD PRESSURE: 76 MMHG | OXYGEN SATURATION: 95 %

## 2024-02-02 PROCEDURE — 99213 OFFICE O/P EST LOW 20 MIN: CPT

## 2024-02-02 NOTE — HISTORY OF PRESENT ILLNESS
[FreeTextEntry1] : 67 YO M seen  3/3/2022 as NPT for inguinal hernia. ED by history. He told me that he had a bilateral inguinal hernia repair by laparoscopy with mesh 12/2019. He also had a left lung decortication 2018 for pneumonia which progressed to empyema. He was caring for his invalid mother who passed last year. After that he noted difficulty achieving and maintaining erection ( for 6 - 8 months, may have been present longer). He has not tried any treatment yet. He has also noted mild right groin discomfort intermittently over the last 2 - 3 months. No problems with urination, Nocturia x 2 - 3.  We discussed possible causes of his ED with low libido and I recommended that we check a full hormonal panel in an AM blood draw. We will also check the PSA at that time. We also discussed treatment of his ED with PDE5i medication and he will consider this option. We reviewed the indications, risks, alternatives and chances for success with this therapy. We will readdress this after we have the results of the blood work. Before prescribing any PDE5i treatment, we will clear this with his Cardiologist, Souleymane Bartholomew.  PSA 0.56 LH 6.4 PRO 6.1 E 2 23 FSH 5.7 TT not done   Patient seen  4/7/2022 to review above. He continues to have sensitivity in the RLQ and right inguinal area. ED continues to be an issue for him. We discussed the patient's continued discomfort in the right inguinal area and right lower quadrant in view of his prior laparoscopic bilateral herniorrhaphy. I recommended that we assess this with a CT scan of the abdomen and pelvis with oral contrast only. He is amenable with this suggestion after we discussed the indications risks alternatives and chance for success and the CT scan is ordered today. As for the evaluation thus far for the patient's ED, I again discussed the use of PDE 5 on medication and recommended he try sildenafil citrate 50 to 100 mg per dose. Because of his cardiac issues and his glaucoma, I recommended that we discussed this with his cardiologist [place] and his ophthalmologist Dr. Matias santana. To that end, I sent messages via [employer] to each of the doctors and asked the patient to contact them also. After we receive confirmation that they have no issues with using that medication then I will order the medicine for him from [facility] as sildenafil 100 mg, 1/2 to 1 tab 1 hour before activity PRN daily x 30 x 3. In my assessment I do not see any problems with use of the medication at this point.   CT scan 5/6/2022: IMPRESSION: Small fat-containing right inguinal hernia. Small to moderate size left inguinal hernia containing non-obstructed loop of small bowel. Enlarged prostate.  Patient seen 7/8/2022 to discuss findings and treatment. Patient states he is doing well overall. He is using a hernia belt for his right sided discomfort, and his pain has improved greatly. He is hesitant to have another surgery, but is open to an evaluation by general surgeon. He is still experiencing difficulty with erections, and would like to try the sildenafil as he has now gotten cardiac clearance from Dr. Bartholomew and opthalmology clearance from Dr. Salcedo. He denies any issues with his urinary symptoms.  UA: negative IPSS: 6 XOCHITL: 12 JOSE: 4  We discussed the findings of the CT scan which showed a right fat containing inguinal hernia and a left bowel containing inguinal hernia. Patient has had relief from hernia belt, however, we believe it would be best for him to evaluated by a general surgeon. He agrees with this plan. We discussed the signs and symptoms of an emergent situation, which include severe pain, nausea, vomiting, fever. If these occur, he understands he should report directly to ED.  As for his erectile dysfunction, patient has cardiac and opthalmology clearance, so we wishes to try sildenafil. We prescribed sildenafil 100 mg 1/2-1 tab to be taken 1 hr before sexual activity on an empty stomach. It was sent to [facility], we informed him how to use the GoodRx coupon. We discussed goals and side effects of medication.  He will follow up in 3 months for re-evaluation of ED, inguinal hernias.  Patient seen  11/9/2022 to reassess his ED and inguinal hernias. He continues sildenafil 100 mg with no improvement with ED He also found the tadalafil 5 mg daily to be unsuccessful.. No issues with urination. Denies dysuria and gross hematuria. He is having an upcoming inguinal hernia repair in December. He is also planning to have a cardiac ablation done for his A-fib, which was recently diagnosed.  UA traced RBC IPSS 9 XOCHITL 6 JOSE 4 Patient on medication for  HTN, closed angle glaucoma, cholesterol. NKMA, allergic to seafood.  The patient denies fevers, chills, nausea and or vomiting and no unexplained weight loss.  All pertinent parts of the patient PFSH (past medical, family and social histories), laboratory, radiological studies and physician notes were reviewed prior to starting the face to face portion of the  visit. Questionnaire results were discussed with patient. We discussed further options for his ED. I recommend that he continue on the tadalafil 5 mg daily, and add sildenafil 100 mg PRN 1 hour before activity. However, given his newly diagnosed a-fib, we discussed waiting until after his hernia suegery and cardiac ablation to initiate this change. I advised him to get cleared by his cardiologist for this regimen. His BP was re-checked manually, 138/88 on BP meds. Regarding the trace RBC which was newly found today, found on UA dip today, we sent a UA micro, culture, and cytology for further evaluation. If all remains stable, he will follow up in 6 months after his inguinal hernia repair.  C+S, cytology negative.  Patient seen  7/12/2023 to reassess. He told me that no longer has nocturia but has noted urinary urgency with some hesitancy and double voiding over the last 2 months. No dysuria or gross hematuria.  He  He also notes improvement in his ED on the combination therapy on tadalafil daily and sildenafil PRN. Not yet 100%, no side effects.  He requests refills of both. Since his last  visit he has had BIH surgery 12/16/2022, Cardiac ablation 1/13/2023, ORIF right wrist after traumatic fall 3/22/2023.  UA negative IPSS 13 XOCHITL 9 JOSE 4 We discussed the patient's shifting lower urinary tract symptoms and I recommended that we do some further studies at this time. To this end of urine was sent for culture and cytology today and plans were made for him to return with a full bladder to perform uroflowmetry and have a pelvic ultrasound. We were unable to draw blood for the PSA test today, he was given a slip to have this done at the phlebotomy lab. As for the ED, medications were renewed we discussed some tips to try and improve the results if he remains with difficulty on double medication, then we will consider penile duplex ultrasound with intracavernous injection therapy as the next step in evaluation and or treatment. We will plan on follow-up based on the above results.  Urine C+S >100,000 CFU/ml E. coli, -99,000 CFU/ml Enterococcus faecalis treated with 5-day course of Cipro PSA 0.72  Patient seen 7/27/2023 to reassess. He completed a 5-day course of Cipro for a recent UTI and reports resolution of irritative voiding symptoms. Regarding the ED, he continues on the combination therapy tadalafil 5mg and sildenafil 100 mg prn, with satisfactory response. UA trace RBC uroflow: avg rate 7.6 ml/s, max rate 15.5 ml/s (voided 223 cc) pelvic sono: PVR 16 cc, prostate 67 cc. He is doing well urologically and no additional intervention is warranted for his stable LUTS. He will continue on the combination therapy for his ED and we will reassess in 3 months, in view of the satisfactory effect. As for the microhematuria, and a urine C+S and cytology are pending. He understands that this finding is likely residual from his recent UTI. We will also reassess the urine in 3 months.   Patient seen 10/31/2023 to reassess. He told me that since taking the Cipro he notes that his urination is better and he has less nocturia. He still has issues with ED and is not able to function satisfactorily even with the daily tadalafil 5 mg and the sildenafil 100 mt PRN taken 1 hour before sexual activity. He requests further evaluation/treatment for this problem. He remains under the care of his cardiologist ad has F/U visits scheduled after his ablation earlier this year. UA trace RBC IPSS 4 Uroflow good: Vol 318 ml, V Max 19 ml/s, V Ave 11 ml/s. PVR 6 ml. Patient's lower urinary tract symptoms remain stable and no further intervention is indicated at this time. As for his ED, we discussed risk factors and I recommended continue with cardiac evaluation and treatment as he plans. We also discussed next steps in evaluation from the urologic standpoint and I recommended that he consider duplex ultrasound examination of the penis with intracavernous injection stimulation. We reviewed the indications risks alternatives and chances for success with this test and the need to stop all other oral PDE 5 RI medication 1 week prior to the testing. He demonstrated interest in proceeding with this test. Medication was ordered and plans were made. C+S negative.  Patient seen 12/1/2023 for DUS with TRI/STD 0.3 ml due to cardiac history.  Penile DUS: Baseline: Left: Concepción 0.2 mm Right: Concepción 0.3 mm 5 min: Total of 0.5 cc Trimix Left: Concepción 0.9 mm PSV 37.3 cm/s EDV 7.5 cm/s Ri 0.80 Right: Concepción 0.7 mm PSV 26.6 cm/s EDV 8.5 cm/s Ri 0.68 15 Min: Total of 0.5 cc Trimix Left: Concepción 0.8 mm PSV 28.4 cm/s EDV 7.5 cm/s Ri 0.74 Right: Concepción 0.8 mm PSV 25.9 cm/s EDV 7.5 cm/s Ri 0.71 30 min: Additional 0.5 cc Trimix injected for a total of 1.0 cc Trimix Left: Concepción 0.7 mm PSV 37.3 cm/s EDV 7.9 cm/s Ri 0.79 Right: Concepción 0.8 mm PSV 37.1 cm/s EDV 9.4 cm/s Ri 0.75 40 min: Total of 1.0 cc Trimix Left: Concepción 0.9 mm PSV 40.0 cm/s EDV 7.7 cm/s Ri 0.81 Right: Concepción 0.8 mm PSV 40.7 cm/s EDV 8.7 cm/s Ri 0.79 50 min: Additional 0.5 cc Trimix injected for a total of 1.5 cc Trimix Left: Concepción 0.9 mm PSV 36.9 cm/s EDV 7.7 cm/s Ri 0.79 Right: Concepción 0.8 mm PSV 38.4 cm/s EDV 9.8 cm/s Ri 0.75 Patient did not achieve rigidity after 1.5 ml f TRI/STD in increments.  He was observed for 30 minutes after last ICI and no further rigidity resulted.  Findings consistent with CVOD,  Findings today from the duplex ultrasound are consistent with a combination of possible arterial insufficiency along with corporal veno-occlusive dysfunction (CVOD). We reviewed the 4-hour rule for a prolonged recurrent erection which is unlikely in this situation along with what to do if it should occur. Options at this point would be either implantation of an IPP, use of the ERIC, or attempt at a trial with of ICI with observation using a stronger medication mixture. The patient is not interested in an IPP due to his concurrent cardiac issues. He is concerned over surgery for this. As for the ERIC and the higher dose of IPP, he would like to explore these further. We will order TRI/CUST #1 (30/1/10) to try with observation at 0.3 ml/injection. While a higher dose would typically be used with this diagnosis, we will start with this lower dose due to his concurrent CAD. We discussed the indications, risks, alternatives and chances for success with this approach in his situation.  Patient seen 1/11/2024 to reassess his ED with Trial with observation using TRI/CUST #1 at 0.3 ml for CVOD with underlying CAD. Medicine TRI/CUST #1 was not received in the office, appointment cancelled for today, medication reordered and staff will call patient when it arrives to reschedule.   Patient seen TODAY 2/2/2024 to reassess with trial with observation using TRI/CUST #1 at 0.3 ml. Upon questioning, he reported that he took tadalafil and sildenafil 2 days ago.

## 2024-02-02 NOTE — ASSESSMENT
[FreeTextEntry1] : In view of the patient's significant cardiac history, I recommended delaying use of intracavernous medication today even at a small dosage until we are sure that the oral PDE 5 I medications are out of his system.  This typically takes at least 4 days.  I reviewed the reasons for this decision with the patient and he is in agreement.  We will reschedule him to return for a trial with observation under those conditions. Dasia Corrigan MD  .

## 2024-02-06 ENCOUNTER — TRANSCRIPTION ENCOUNTER (OUTPATIENT)
Age: 69
End: 2024-02-06

## 2024-02-09 ENCOUNTER — APPOINTMENT (OUTPATIENT)
Dept: UROLOGY | Facility: CLINIC | Age: 69
End: 2024-02-09
Payer: MEDICARE

## 2024-02-09 VITALS
SYSTOLIC BLOOD PRESSURE: 150 MMHG | HEIGHT: 70 IN | HEART RATE: 73 BPM | BODY MASS INDEX: 32.07 KG/M2 | DIASTOLIC BLOOD PRESSURE: 81 MMHG | OXYGEN SATURATION: 97 % | WEIGHT: 224 LBS | TEMPERATURE: 97.16 F

## 2024-02-09 PROCEDURE — 99214 OFFICE O/P EST MOD 30 MIN: CPT

## 2024-02-09 NOTE — ASSESSMENT
[FreeTextEntry1] : Patient is satisfied with the result of the ICI today.  He would like to proceed to learn self injection with my technique.  We made plans for him to come back for a teaching session including self injection using sterile water and he will then self inject with Trimix No. 1 (30/1/10) at 0.3 cc/inj.  I reviewed with him the 4-hour rule concerning recurrence or persistence of an erection later on today and what he should do if that should happen and last more than 4 hours.  This is unlikely to happen in this patient. Dasia Corrigan MD

## 2024-02-09 NOTE — HISTORY OF PRESENT ILLNESS
[FreeTextEntry1] : 67 YO M seen  3/3/2022 as NPT for inguinal hernia. ED by history. He told me that he had a bilateral inguinal hernia repair by laparoscopy with mesh 12/2019. He also had a left lung decortication 2018 for pneumonia which progressed to empyema. He was caring for his invalid mother who passed last year. After that he noted difficulty achieving and maintaining erection ( for 6 - 8 months, may have been present longer). He has not tried any treatment yet. He has also noted mild right groin discomfort intermittently over the last 2 - 3 months. No problems with urination, Nocturia x 2 - 3.  We discussed possible causes of his ED with low libido and I recommended that we check a full hormonal panel in an AM blood draw. We will also check the PSA at that time. We also discussed treatment of his ED with PDE5i medication and he will consider this option. We reviewed the indications, risks, alternatives and chances for success with this therapy. We will readdress this after we have the results of the blood work. Before prescribing any PDE5i treatment, we will clear this with his Cardiologist, Souleymane Bartholomew.  PSA 0.56 LH 6.4 PRO 6.1 E 2 23 FSH 5.7 TT not done   Patient seen  4/7/2022 to review above. He continues to have sensitivity in the RLQ and right inguinal area. ED continues to be an issue for him. We discussed the patient's continued discomfort in the right inguinal area and right lower quadrant in view of his prior laparoscopic bilateral herniorrhaphy. I recommended that we assess this with a CT scan of the abdomen and pelvis with oral contrast only. He is amenable with this suggestion after we discussed the indications risks alternatives and chance for success and the CT scan is ordered today. As for the evaluation thus far for the patient's ED, I again discussed the use of PDE 5 on medication and recommended he try sildenafil citrate 50 to 100 mg per dose. Because of his cardiac issues and his glaucoma, I recommended that we discussed this with his cardiologist [place] and his ophthalmologist Dr. Matias santana. To that end, I sent messages via irisnote to each of the doctors and asked the patient to contact them also. After we receive confirmation that they have no issues with using that medication then I will order the medicine for him from [facility] as sildenafil 100 mg, 1/2 to 1 tab 1 hour before activity PRN daily x 30 x 3. In my assessment I do not see any problems with use of the medication at this point.   CT scan 5/6/2022: IMPRESSION: Small fat-containing right inguinal hernia. Small to moderate size left inguinal hernia containing non-obstructed loop of small bowel. Enlarged prostate.  Patient seen 7/8/2022 to discuss findings and treatment. Patient states he is doing well overall. He is using a hernia belt for his right sided discomfort, and his pain has improved greatly. He is hesitant to have another surgery, but is open to an evaluation by general surgeon. He is still experiencing difficulty with erections, and would like to try the sildenafil as he has now gotten cardiac clearance from Dr. Bartholomew and opthalmology clearance from Dr. Salcedo. He denies any issues with his urinary symptoms.  UA: negative IPSS: 6 XOCHITL: 12 JOSE: 4  We discussed the findings of the CT scan which showed a right fat containing inguinal hernia and a left bowel containing inguinal hernia. Patient has had relief from hernia belt, however, we believe it would be best for him to evaluated by a general surgeon. He agrees with this plan. We discussed the signs and symptoms of an emergent situation, which include severe pain, nausea, vomiting, fever. If these occur, he understands he should report directly to ED.  As for his erectile dysfunction, patient has cardiac and opthalmology clearance, so we wishes to try sildenafil. We prescribed sildenafil 100 mg 1/2-1 tab to be taken 1 hr before sexual activity on an empty stomach. It was sent to [facility], we informed him how to use the GoodRx coupon. We discussed goals and side effects of medication.  He will follow up in 3 months for re-evaluation of ED, inguinal hernias.  Patient seen  11/9/2022 to reassess his ED and inguinal hernias. He continues sildenafil 100 mg with no improvement with ED He also found the tadalafil 5 mg daily to be unsuccessful.. No issues with urination. Denies dysuria and gross hematuria. He is having an upcoming inguinal hernia repair in December. He is also planning to have a cardiac ablation done for his A-fib, which was recently diagnosed.  UA traced RBC IPSS 9 XOCHITL 6 JOSE 4 Patient on medication for  HTN, closed angle glaucoma, cholesterol. NKMA, allergic to seafood.  The patient denies fevers, chills, nausea and or vomiting and no unexplained weight loss.  All pertinent parts of the patient PFSH (past medical, family and social histories), laboratory, radiological studies and physician notes were reviewed prior to starting the face to face portion of the  visit. Questionnaire results were discussed with patient. We discussed further options for his ED. I recommend that he continue on the tadalafil 5 mg daily, and add sildenafil 100 mg PRN 1 hour before activity. However, given his newly diagnosed a-fib, we discussed waiting until after his hernia suegery and cardiac ablation to initiate this change. I advised him to get cleared by his cardiologist for this regimen. His BP was re-checked manually, 138/88 on BP meds. Regarding the trace RBC which was newly found today, found on UA dip today, we sent a UA micro, culture, and cytology for further evaluation. If all remains stable, he will follow up in 6 months after his inguinal hernia repair.  C+S, cytology negative.  Patient seen  7/12/2023 to reassess. He told me that no longer has nocturia but has noted urinary urgency with some hesitancy and double voiding over the last 2 months. No dysuria or gross hematuria.  He  He also notes improvement in his ED on the combination therapy on tadalafil daily and sildenafil PRN. Not yet 100%, no side effects.  He requests refills of both. Since his last  visit he has had BIH surgery 12/16/2022, Cardiac ablation 1/13/2023, ORIF right wrist after traumatic fall 3/22/2023.  UA negative IPSS 13 XOCHITL 9 JOSE 4 We discussed the patient's shifting lower urinary tract symptoms and I recommended that we do some further studies at this time. To this end of urine was sent for culture and cytology today and plans were made for him to return with a full bladder to perform uroflowmetry and have a pelvic ultrasound. We were unable to draw blood for the PSA test today, he was given a slip to have this done at the phlebotomy lab. As for the ED, medications were renewed we discussed some tips to try and improve the results if he remains with difficulty on double medication, then we will consider penile duplex ultrasound with intracavernous injection therapy as the next step in evaluation and or treatment. We will plan on follow-up based on the above results.  Urine C+S >100,000 CFU/ml E. coli, -99,000 CFU/ml Enterococcus faecalis treated with 5-day course of Cipro PSA 0.72  Patient seen 7/27/2023 to reassess. He completed a 5-day course of Cipro for a recent UTI and reports resolution of irritative voiding symptoms. Regarding the ED, he continues on the combination therapy tadalafil 5mg and sildenafil 100 mg prn, with satisfactory response. UA trace RBC uroflow: avg rate 7.6 ml/s, max rate 15.5 ml/s (voided 223 cc) pelvic sono: PVR 16 cc, prostate 67 cc. He is doing well urologically and no additional intervention is warranted for his stable LUTS. He will continue on the combination therapy for his ED and we will reassess in 3 months, in view of the satisfactory effect. As for the microhematuria, and a urine C+S and cytology are pending. He understands that this finding is likely residual from his recent UTI. We will also reassess the urine in 3 months.   Patient seen 10/31/2023 to reassess. He told me that since taking the Cipro he notes that his urination is better and he has less nocturia. He still has issues with ED and is not able to function satisfactorily even with the daily tadalafil 5 mg and the sildenafil 100 mt PRN taken 1 hour before sexual activity. He requests further evaluation/treatment for this problem. He remains under the care of his cardiologist ad has F/U visits scheduled after his ablation earlier this year. UA trace RBC IPSS 4 Uroflow good: Vol 318 ml, V Max 19 ml/s, V Ave 11 ml/s. PVR 6 ml. Patient's lower urinary tract symptoms remain stable and no further intervention is indicated at this time. As for his ED, we discussed risk factors and I recommended continue with cardiac evaluation and treatment as he plans. We also discussed next steps in evaluation from the urologic standpoint and I recommended that he consider duplex ultrasound examination of the penis with intracavernous injection stimulation. We reviewed the indications risks alternatives and chances for success with this test and the need to stop all other oral PDE 5 RI medication 1 week prior to the testing. He demonstrated interest in proceeding with this test. Medication was ordered and plans were made. C+S negative.  Patient seen 12/1/2023 for DUS with TRI/STD 0.3 ml due to cardiac history.  Penile DUS: Baseline: Left: [place] 0.2 mm Right: [place] 0.3 mm 5 min: Total of 0.5 cc Trimix Left: [place] 0.9 mm PSV 37.3 cm/s EDV 7.5 cm/s Ri 0.80 Right: [place] 0.7 mm PSV 26.6 cm/s EDV 8.5 cm/s Ri 0.68 15 Min: Total of 0.5 cc Trimix Left: [place] 0.8 mm PSV 28.4 cm/s EDV 7.5 cm/s Ri 0.74 Right: [place] 0.8 mm PSV 25.9 cm/s EDV 7.5 cm/s Ri 0.71 30 min: Additional 0.5 cc Trimix injected for a total of 1.0 cc Trimix Left: [place] 0.7 mm PSV 37.3 cm/s EDV 7.9 cm/s Ri 0.79 Right: [place] 0.8 mm PSV 37.1 cm/s EDV 9.4 cm/s Ri 0.75 40 min: Total of 1.0 cc Trimix Left: [place] 0.9 mm PSV 40.0 cm/s EDV 7.7 cm/s Ri 0.81 Right: [place] 0.8 mm PSV 40.7 cm/s EDV 8.7 cm/s Ri 0.79 50 min: Additional 0.5 cc Trimix injected for a total of 1.5 cc Trimix Left: [place] 0.9 mm PSV 36.9 cm/s EDV 7.7 cm/s Ri 0.79 Right: [place] 0.8 mm PSV 38.4 cm/s EDV 9.8 cm/s Ri 0.75 Patient did not achieve rigidity after 1.5 ml f TRI/STD in increments.  He was observed for 30 minutes after last ICI and no further rigidity resulted.  Findings consistent with CVOD,  Findings today from the duplex ultrasound are consistent with a combination of possible arterial insufficiency along with corporal veno-occlusive dysfunction (CVOD). We reviewed the 4-hour rule for a prolonged recurrent erection which is unlikely in this situation along with what to do if it should occur. Options at this point would be either implantation of an IPP, use of the ERIC, or attempt at a trial with of ICI with observation using a stronger medication mixture. The patient is not interested in an IPP due to his concurrent cardiac issues. He is concerned over surgery for this. As for the ERIC and the higher dose of IPP, he would like to explore these further. We will order TRI/CUST #1 (30/1/10) to try with observation at 0.3 ml/injection. While a higher dose would typically be used with this diagnosis, we will start with this lower dose due to his concurrent CAD. We discussed the indications, risks, alternatives and chances for success with this approach in his situation.  Patient seen 1/11/2024 to reassess his ED with Trial with observation using TRI/CUST #1 at 0.3 ml for CVOD with underlying CAD. Medicine TRI/CUST #1 was not received in the office, appointment cancelled for today, medication reordered and staff will call patient when it arrives to reschedule.   Patient seen 2/2/2024 to reassess with trial with observation using TRI/CUST #1 at 0.3 ml. Upon questioning, he reported that he took tadalafil and sildenafil 2 days ago. Rescheduled.  Patient seen TODAY 2/9/2024 to reassess with trial with observation using TRI/CUST #1 at 0.3 ml. He confirmed no oral PDE5i medication for 1 week. 8:45 AM TRIMIX #1 0.3 ml injected by me. 8:50  Result 2.5 manual stim added. 9:00  Result 2.5-3.  9:35 Result 2.5-2 10:00 Flaccid

## 2024-02-12 ENCOUNTER — NON-APPOINTMENT (OUTPATIENT)
Age: 69
End: 2024-02-12

## 2024-02-12 ENCOUNTER — APPOINTMENT (OUTPATIENT)
Dept: HEART AND VASCULAR | Facility: CLINIC | Age: 69
End: 2024-02-12

## 2024-02-12 ENCOUNTER — APPOINTMENT (OUTPATIENT)
Dept: HEART AND VASCULAR | Facility: CLINIC | Age: 69
End: 2024-02-12
Payer: MEDICARE

## 2024-02-12 VITALS
BODY MASS INDEX: 32.07 KG/M2 | SYSTOLIC BLOOD PRESSURE: 140 MMHG | DIASTOLIC BLOOD PRESSURE: 76 MMHG | WEIGHT: 224 LBS | HEIGHT: 70 IN | TEMPERATURE: 97.8 F | OXYGEN SATURATION: 97 % | HEART RATE: 72 BPM

## 2024-02-12 DIAGNOSIS — I48.91 UNSPECIFIED ATRIAL FIBRILLATION: ICD-10-CM

## 2024-02-12 DIAGNOSIS — R06.02 SHORTNESS OF BREATH: ICD-10-CM

## 2024-02-12 PROCEDURE — 78452 HT MUSCLE IMAGE SPECT MULT: CPT

## 2024-02-12 PROCEDURE — 93015 CV STRESS TEST SUPVJ I&R: CPT

## 2024-02-12 PROCEDURE — A9500: CPT

## 2024-02-12 PROCEDURE — 99214 OFFICE O/P EST MOD 30 MIN: CPT | Mod: 25

## 2024-02-12 RX ORDER — LOSARTAN POTASSIUM 25 MG/1
25 TABLET, FILM COATED ORAL DAILY
Qty: 90 | Refills: 1 | Status: DISCONTINUED | COMMUNITY
Start: 2018-04-13 | End: 2024-02-12

## 2024-02-12 RX ORDER — LOSARTAN POTASSIUM AND HYDROCHLOROTHIAZIDE 12.5; 5 MG/1; MG/1
50-12.5 TABLET ORAL
Qty: 90 | Refills: 1 | Status: ACTIVE | COMMUNITY
Start: 2024-02-12 | End: 1900-01-01

## 2024-02-12 NOTE — PHYSICAL EXAM
[Well Developed] : well developed [Well Nourished] : well nourished [No Acute Distress] : no acute distress [Normal Conjunctiva] : normal conjunctiva [Normal Venous Pressure] : normal venous pressure [No Carotid Bruit] : no carotid bruit [Normal S1, S2] : normal S1, S2 [No Rub] : no rub [No Gallop] : no gallop [Murmur] : murmur [Clear Lung Fields] : clear lung fields [Good Air Entry] : good air entry [No Respiratory Distress] : no respiratory distress  [Soft] : abdomen soft [Non Tender] : non-tender [No Masses/organomegaly] : no masses/organomegaly [Normal Bowel Sounds] : normal bowel sounds [Normal Gait] : normal gait [No Edema] : no edema [No Cyanosis] : no cyanosis [No Clubbing] : no clubbing [Moves all extremities] : moves all extremities [No Focal Deficits] : no focal deficits [Normal Speech] : normal speech [Alert and Oriented] : alert and oriented [Normal memory] : normal memory [de-identified] : 2/6 [de-identified] : multiple ecchymosis

## 2024-02-12 NOTE — DISCUSSION/SUMMARY
[FreeTextEntry1] : stable exam sob- for lexiscan stress HTN elevated change to losartan 50/12.5 lipids stable on statin afib stable in nsr cont AC

## 2024-02-23 ENCOUNTER — APPOINTMENT (OUTPATIENT)
Dept: UROLOGY | Facility: CLINIC | Age: 69
End: 2024-02-23
Payer: MEDICARE

## 2024-02-23 VITALS
DIASTOLIC BLOOD PRESSURE: 71 MMHG | HEART RATE: 80 BPM | WEIGHT: 224 LBS | TEMPERATURE: 97.88 F | OXYGEN SATURATION: 95 % | BODY MASS INDEX: 32.07 KG/M2 | HEIGHT: 70 IN | SYSTOLIC BLOOD PRESSURE: 132 MMHG

## 2024-02-23 PROCEDURE — 99214 OFFICE O/P EST MOD 30 MIN: CPT

## 2024-02-23 NOTE — HISTORY OF PRESENT ILLNESS
[FreeTextEntry1] : 67 YO M seen  3/3/2022 as NPT for inguinal hernia. ED by history. He told me that he had a bilateral inguinal hernia repair by laparoscopy with mesh 12/2019. He also had a left lung decortication 2018 for pneumonia which progressed to empyema. He was caring for his invalid mother who passed last year. After that he noted difficulty achieving and maintaining erection ( for 6 - 8 months, may have been present longer). He has not tried any treatment yet. He has also noted mild right groin discomfort intermittently over the last 2 - 3 months. No problems with urination, Nocturia x 2 - 3.  We discussed possible causes of his ED with low libido and I recommended that we check a full hormonal panel in an AM blood draw. We will also check the PSA at that time. We also discussed treatment of his ED with PDE5i medication and he will consider this option. We reviewed the indications, risks, alternatives and chances for success with this therapy. We will readdress this after we have the results of the blood work. Before prescribing any PDE5i treatment, we will clear this with his Cardiologist, Souleymane Bartholomew.  PSA 0.56 LH 6.4 PRO 6.1 E 2 23 FSH 5.7 TT not done   Patient seen  4/7/2022 to review above. He continues to have sensitivity in the RLQ and right inguinal area. ED continues to be an issue for him. We discussed the patient's continued discomfort in the right inguinal area and right lower quadrant in view of his prior laparoscopic bilateral herniorrhaphy. I recommended that we assess this with a CT scan of the abdomen and pelvis with oral contrast only. He is amenable with this suggestion after we discussed the indications risks alternatives and chance for success and the CT scan is ordered today. As for the evaluation thus far for the patient's ED, I again discussed the use of PDE 5 on medication and recommended he try sildenafil citrate 50 to 100 mg per dose. Because of his cardiac issues and his glaucoma, I recommended that we discussed this with his cardiologist [place] and his ophthalmologist Dr. Matias santana. To that end, I sent messages via [employer] to each of the doctors and asked the patient to contact them also. After we receive confirmation that they have no issues with using that medication then I will order the medicine for him from [facility] as sildenafil 100 mg, 1/2 to 1 tab 1 hour before activity PRN daily x 30 x 3. In my assessment I do not see any problems with use of the medication at this point.   CT scan 5/6/2022: IMPRESSION: Small fat-containing right inguinal hernia. Small to moderate size left inguinal hernia containing non-obstructed loop of small bowel. Enlarged prostate.  Patient seen 7/8/2022 to discuss findings and treatment. Patient states he is doing well overall. He is using a hernia belt for his right sided discomfort, and his pain has improved greatly. He is hesitant to have another surgery, but is open to an evaluation by general surgeon. He is still experiencing difficulty with erections, and would like to try the sildenafil as he has now gotten cardiac clearance from Dr. Bartholomew and opthalmology clearance from Dr. Salcedo. He denies any issues with his urinary symptoms.  UA: negative IPSS: 6 XOCHITL: 12 JOSE: 4  We discussed the findings of the CT scan which showed a right fat containing inguinal hernia and a left bowel containing inguinal hernia. Patient has had relief from hernia belt, however, we believe it would be best for him to evaluated by a general surgeon. He agrees with this plan. We discussed the signs and symptoms of an emergent situation, which include severe pain, nausea, vomiting, fever. If these occur, he understands he should report directly to ED.  As for his erectile dysfunction, patient has cardiac and opthalmology clearance, so we wishes to try sildenafil. We prescribed sildenafil 100 mg 1/2-1 tab to be taken 1 hr before sexual activity on an empty stomach. It was sent to [facility], we informed him how to use the GoodRx coupon. We discussed goals and side effects of medication.  He will follow up in 3 months for re-evaluation of ED, inguinal hernias.  Patient seen  11/9/2022 to reassess his ED and inguinal hernias. He continues sildenafil 100 mg with no improvement with ED He also found the tadalafil 5 mg daily to be unsuccessful.. No issues with urination. Denies dysuria and gross hematuria. He is having an upcoming inguinal hernia repair in December. He is also planning to have a cardiac ablation done for his A-fib, which was recently diagnosed.  UA traced RBC IPSS 9 XOCHITL 6 JOSE 4 Patient on medication for  HTN, closed angle glaucoma, cholesterol. NKMA, allergic to seafood.  The patient denies fevers, chills, nausea and or vomiting and no unexplained weight loss.  All pertinent parts of the patient PFSH (past medical, family and social histories), laboratory, radiological studies and physician notes were reviewed prior to starting the face to face portion of the  visit. Questionnaire results were discussed with patient. We discussed further options for his ED. I recommend that he continue on the tadalafil 5 mg daily, and add sildenafil 100 mg PRN 1 hour before activity. However, given his newly diagnosed a-fib, we discussed waiting until after his hernia suegery and cardiac ablation to initiate this change. I advised him to get cleared by his cardiologist for this regimen. His BP was re-checked manually, 138/88 on BP meds. Regarding the trace RBC which was newly found today, found on UA dip today, we sent a UA micro, culture, and cytology for further evaluation. If all remains stable, he will follow up in 6 months after his inguinal hernia repair.  C+S, cytology negative.  Patient seen  7/12/2023 to reassess. He told me that no longer has nocturia but has noted urinary urgency with some hesitancy and double voiding over the last 2 months. No dysuria or gross hematuria.  He  He also notes improvement in his ED on the combination therapy on tadalafil daily and sildenafil PRN. Not yet 100%, no side effects.  He requests refills of both. Since his last  visit he has had BIH surgery 12/16/2022, Cardiac ablation 1/13/2023, ORIF right wrist after traumatic fall 3/22/2023.  UA negative IPSS 13 XOCHITL 9 JOSE 4 We discussed the patient's shifting lower urinary tract symptoms and I recommended that we do some further studies at this time. To this end of urine was sent for culture and cytology today and plans were made for him to return with a full bladder to perform uroflowmetry and have a pelvic ultrasound. We were unable to draw blood for the PSA test today, he was given a slip to have this done at the phlebotomy lab. As for the ED, medications were renewed we discussed some tips to try and improve the results if he remains with difficulty on double medication, then we will consider penile duplex ultrasound with intracavernous injection therapy as the next step in evaluation and or treatment. We will plan on follow-up based on the above results.  Urine C+S >100,000 CFU/ml E. coli, -99,000 CFU/ml Enterococcus faecalis treated with 5-day course of Cipro PSA 0.72  Patient seen 7/27/2023 to reassess. He completed a 5-day course of Cipro for a recent UTI and reports resolution of irritative voiding symptoms. Regarding the ED, he continues on the combination therapy tadalafil 5mg and sildenafil 100 mg prn, with satisfactory response. UA trace RBC uroflow: avg rate 7.6 ml/s, max rate 15.5 ml/s (voided 223 cc) pelvic sono: PVR 16 cc, prostate 67 cc. He is doing well urologically and no additional intervention is warranted for his stable LUTS. He will continue on the combination therapy for his ED and we will reassess in 3 months, in view of the satisfactory effect. As for the microhematuria, and a urine C+S and cytology are pending. He understands that this finding is likely residual from his recent UTI. We will also reassess the urine in 3 months.   Patient seen 10/31/2023 to reassess. He told me that since taking the Cipro he notes that his urination is better and he has less nocturia. He still has issues with ED and is not able to function satisfactorily even with the daily tadalafil 5 mg and the sildenafil 100 mt PRN taken 1 hour before sexual activity. He requests further evaluation/treatment for this problem. He remains under the care of his cardiologist ad has F/U visits scheduled after his ablation earlier this year. UA trace RBC IPSS 4 Uroflow good: Vol 318 ml, V Max 19 ml/s, V Ave 11 ml/s. PVR 6 ml. Patient's lower urinary tract symptoms remain stable and no further intervention is indicated at this time. As for his ED, we discussed risk factors and I recommended continue with cardiac evaluation and treatment as he plans. We also discussed next steps in evaluation from the urologic standpoint and I recommended that he consider duplex ultrasound examination of the penis with intracavernous injection stimulation. We reviewed the indications risks alternatives and chances for success with this test and the need to stop all other oral PDE 5 RI medication 1 week prior to the testing. He demonstrated interest in proceeding with this test. Medication was ordered and plans were made. C+S negative.  Patient seen 12/1/2023 for DUS with TRI/STD 0.3 ml due to cardiac history.  Penile DUS: Baseline: Left: Concepción 0.2 mm Right: Concepc[place] 0.3 mm 5 min: Total of 0.5 cc Trimix Left: [place] 0.9 mm PSV 37.3 cm/s EDV 7.5 cm/s Ri 0.80 Right: [place] 0.7 mm PSV 26.6 cm/s EDV 8.5 cm/s Ri 0.68 15 Min: Total of 0.5 cc Trimix Left: [place] 0.8 mm PSV 28.4 cm/s EDV 7.5 cm/s Ri 0.74 Right: [place] 0.8 mm PSV 25.9 cm/s EDV 7.5 cm/s Ri 0.71 30 min: Additional 0.5 cc Trimix injected for a total of 1.0 cc Trimix Left: [place] 0.7 mm PSV 37.3 cm/s EDV 7.9 cm/s Ri 0.79 Right: [place] 0.8 mm PSV 37.1 cm/s EDV 9.4 cm/s Ri 0.75 40 min: Total of 1.0 cc Trimix Left: [place] 0.9 mm PSV 40.0 cm/s EDV 7.7 cm/s Ri 0.81 Right: [place] 0.8 mm PSV 40.7 cm/s EDV 8.7 cm/s Ri 0.79 50 min: Additional 0.5 cc Trimix injected for a total of 1.5 cc Trimix Left: [place] 0.9 mm PSV 36.9 cm/s EDV 7.7 cm/s Ri 0.79 Right: [place] 0.8 mm PSV 38.4 cm/s EDV 9.8 cm/s Ri 0.75 Patient did not achieve rigidity after 1.5 ml f TRI/STD in increments.  He was observed for 30 minutes after last ICI and no further rigidity resulted.  Findings consistent with CVOD,  Findings today from the duplex ultrasound are consistent with a combination of possible arterial insufficiency along with corporal veno-occlusive dysfunction (CVOD). We reviewed the 4-hour rule for a prolonged recurrent erection which is unlikely in this situation along with what to do if it should occur. Options at this point would be either implantation of an IPP, use of the ERIC, or attempt at a trial with of ICI with observation using a stronger medication mixture. The patient is not interested in an IPP due to his concurrent cardiac issues. He is concerned over surgery for this. As for the ERIC and the higher dose of IPP, he would like to explore these further. We will order TRI/CUST #1 (30/1/10) to try with observation at 0.3 ml/injection. While a higher dose would typically be used with this diagnosis, we will start with this lower dose due to his concurrent CAD. We discussed the indications, risks, alternatives and chances for success with this approach in his situation.  Patient seen 1/11/2024 to reassess his ED with Trial with observation using TRI/CUST #1 at 0.3 ml for CVOD with underlying CAD. Medicine TRI/CUST #1 was not received in the office, appointment cancelled for today, medication reordered and staff will call patient when it arrives to reschedule.   Patient seen 2/2/2024 to reassess with trial with observation using TRI/CUST #1 at 0.3 ml. Upon questioning, he reported that he took tadalafil and sildenafil 2 days ago. Rescheduled.  Patient seen TODAY 2/9/2024 to reassess with trial with observation using TRI/CUST #1 at 0.3 ml. He confirmed no oral PDE5i medication for 1 week. 8:45 AM TRIMIX #1 0.3 ml injected by me. 8:50  Result 2.5 manual stim added. 9:00  Result 2.5-3.  9:35 Result 2.5-2 10:00 Flaccid Patient is satisfied with the result of the ICI today. He would like to proceed to learn self injection with my technique. We made plans for him to come back for a teaching session including self injection using sterile water and he will then self inject with Trimix No. 1 (30/1/10) at 0.3 cc/inj. I reviewed with him the 4-hour rule concerning recurrence or persistence of an erection later on today and what he should do if that should happen and last more than 4 hours. This is unlikely to happen in this patient.   Patient seen TODAY 2/23/2024 to learn ICI with TRI #1 at 0.3 ml/injection.

## 2024-02-23 NOTE — ASSESSMENT
[FreeTextEntry1] : If reviewing ICI technique with the patient in detail, he was given a handout from my book which also included diagrams and a full description of the technique.  I then told him self injection using my technique which included observing him inject himself with sterile water.  When he felt comfortable with the procedure, he was given the remainder of his medication from the office with supplies to try at home.  I reviewed with him the 4-hour rule for prolonged erection.  We will follow-up as needed or in 3 months to confirm his progress. Dasia Corrigan MD

## 2024-02-23 NOTE — LETTER BODY
[Dear  ___] : Dear  [unfilled], [Courtesy Letter:] : I had the pleasure of seeing your patient, [unfilled], in my office today. [Consult Closing:] : Thank you very much for allowing me to participate in the care of this patient.  If you have any questions, please do not hesitate to contact me. [Please see my note below.] : Please see my note below. [FreeTextEntry3] : Best Regards,   Dasia Corrigan MD

## 2024-02-29 ENCOUNTER — APPOINTMENT (OUTPATIENT)
Dept: PHYSICAL MEDICINE AND REHAB | Facility: CLINIC | Age: 69
End: 2024-02-29
Payer: MEDICARE

## 2024-02-29 ENCOUNTER — OUTPATIENT (OUTPATIENT)
Dept: OUTPATIENT SERVICES | Facility: HOSPITAL | Age: 69
LOS: 1 days | End: 2024-02-29
Payer: MEDICARE

## 2024-02-29 VITALS
OXYGEN SATURATION: 96 % | TEMPERATURE: 208.22 F | HEART RATE: 78 BPM | HEIGHT: 70 IN | BODY MASS INDEX: 32.07 KG/M2 | WEIGHT: 224 LBS | DIASTOLIC BLOOD PRESSURE: 69 MMHG | SYSTOLIC BLOOD PRESSURE: 118 MMHG

## 2024-02-29 DIAGNOSIS — S29.012A STRAIN OF MUSCLE AND TENDON OF BACK WALL OF THORAX, INITIAL ENCOUNTER: ICD-10-CM

## 2024-02-29 DIAGNOSIS — Z93.8 OTHER ARTIFICIAL OPENING STATUS: Chronic | ICD-10-CM

## 2024-02-29 DIAGNOSIS — M67.911 UNSPECIFIED DISORDER OF SYNOVIUM AND TENDON, RIGHT SHOULDER: ICD-10-CM

## 2024-02-29 DIAGNOSIS — M75.41 IMPINGEMENT SYNDROME OF RIGHT SHOULDER: ICD-10-CM

## 2024-02-29 PROCEDURE — G2211 COMPLEX E/M VISIT ADD ON: CPT

## 2024-02-29 PROCEDURE — 99215 OFFICE O/P EST HI 40 MIN: CPT

## 2024-02-29 PROCEDURE — 72110 X-RAY EXAM L-2 SPINE 4/>VWS: CPT

## 2024-02-29 PROCEDURE — 72110 X-RAY EXAM L-2 SPINE 4/>VWS: CPT | Mod: 26

## 2024-02-29 NOTE — DATA REVIEWED
[MRI] : MRI [FreeTextEntry1] : EXAM: [id number] - MR SPINE CERVICAL  - ORDERED BY: ASHLEIGH YEUNG PROCEDURE DATE:  06/29/2023 INTERPRETATION:  CERVICAL SPINE MRI CLINICAL INFORMATION: Neck pain. Radiculopathy. TECHNIQUE: Multiplanar, multisequence MRI was obtained of the cervical spine. Comparison is made to 6/10/2021 FINDINGS: DISC LEVEL EVALUATION: Multilevel anterior bridging osteophyte formation is present. C1/2: Moderate arthrosis between the dens and the anterior arch of C1. C2/C3: Fusion of the left-sided facet joint. C3/C4: No spinal canal or foraminal narrowing. C4/C5: Small posterior disc protrusion. Thickening of the posterior longitudinal ligament with ossification within the ligament extending from the level of the C4-C5 disc space through the C6-C7 disc space. The level of the C5 vertebral body this results in indentation upon the central and left aspect of the cord without cord signal abnormality. C5/C6: Moderate-sized posterior disc protrusion along with ossification of the posterior longitudinal ligament resulting in disc and ossified ligament contacting and indenting the left aspect of the cord. C6/C7: Moderate disc height loss with a moderate-sized posterior disc protrusion which along with ossified posterior longitudinal ligament results in indentation of the right aspect of the cord C7/T1: Mild disc height loss with mild to moderate disc bulging and osteophyte formation. Mild foraminal narrowing.  Mild partially imaged multilevel thoracic degenerative disc disease.  ALIGNMENT: Straightening of the normal cervical lordosis. CORD: No cord signal abnormality. MARROW: No bone marrow edema or fracture. IMAGED BRAIN: Normal PERIPHERAL/NECK SOFT TISSUES: Cystic lesion within the left parotid gland with fluid fluid levels that is unchanged compared to the prior study.  IMPRESSION: No change in the appearance of DISH and OPLL with degenerative disc disease resulting in spinal stenosis at C5-C6 and C6-C7 without cord signal abnormality.  --- End of Report ---

## 2024-02-29 NOTE — HISTORY OF PRESENT ILLNESS
[FreeTextEntry1] : Byron Rain M.D. Sports Medicine and Interventional Spine Department of Physical Medicine and Rehabilitation  [facility] [address], [address]th [place] [address]  [facility] [place] [facility] [address] [place] [address]  [facility] at  [facility] [address], [address]th [place] [address]  For [place] Appointments Phone: [phone] Fax: [phone]  ----------------------------------------------------------------------------------------------------------------------------------------  PATIENT: TOM PATEL  MRN: [id number]  YOB: 1955  DATE OF SERVICE: Feb 29, 2024 Feb 29, 2024   Dear Parveen  Thank you for referring TOM PATEL to my Sports and Interventional Spine practice and office. Enclosed is a copy of the patient's consultation/progress note, which includes my complete assessment and recent studies completed during the patient's evaluation.  If you have questions or have any patients who require nonsurgical, non-opiate management of any sports, spine, or musculoskeletal conditions, please do not hesitate to contact my , Rosey Munson at [phone].  I look forward to taking care of your patients along with you.  Sincerely,  Byron Rain MD Sports, Interventional Spine, & Regenerative Musculoskeletal Medicine [facility] [place] [facility]                                                    Follow Up Visit CC: pain, arm  HPI:  This is the first visit to [facility] [place] [facility]ventional Spine [facility].    TOM PATEL presents with the chief complaint as above.    Interval Hx on Feb 29, 2024: presents for follow up. Since last visit, patient returns with progressively worsening low back pain. patient carefully describes period of viral illness in January 2024. rates their pain at 10/10. patient points to the left posterior thigh and right mid back as their overall worst pain region. patient has not been able to walk as much as before, patient with marked difficulty with navigating stairs, pain is also particularly worsened with rotation and with sneezing, coughing. patient cannot recall this much axial low back pain previously. patient notes increased reliance on their bath tub DME. Since the last visit, they relate improvements in pain previously associated with known exacerbating, aggravating factors and situations. Pharmacologic treatments now include OTC analgesics PRN, but otherwise pharmacologic treatments are minimal. Denies new or worsened numbness, tingling, or focal motor deficit. Denies interval fall, accident, or injury. Denies change in bowel or bladder functioning. patient has been applying the compound to the lower back on the left and above the waist on the right low back region. patient notes improved blood pressure control since close follow up with Cardiology over the past few weeks, last visit 2/12/2024.   Meds: denies regular PO pain medications besides PRN tylenol Therapy Program: no recent structured targeted therapy program HEP: doing HEP regularly  Assoc Sx: Denies numbness, Tingling No indication of myelopathic type symptoms Denies Focal motor weakness in the upper or lower limbs Denies New or worsened bowel or bladder incontinence Denies Saddle anesthesia Denies Using Orthotic(s)/Supportive devices They also deny frequent tripping, falling  ROS: A 14 point review of systems was completed. Positive findings are pain as described above. The remaining systems negative.  Prostate Hx: up to date COVID HX: reviewed  Interval Hx on Dec 14, 2023: presents for follow up. Since last visit, they completed PT with their right shoulder, last sessions 12/7/2023, attended PT for 1-2 times per week. patient has developed further HEP at home, patient has been able to continue many treatments from their PT at home. patient has not been able to swimming. They have intermittent righ inguinal pain, not particularly worsened. patient believes their low back has acutely worsened as well, having difficulty/pain with rotation while lifting items ("possible"), experiences paroxysm of low back pain "punch" of pain intermittently. patient is interested in having similar outcome as their shoulder over the cervical region. patient also notes chronic spine issues among their relatives and ancestors. Patient is interested in optimizing their functional use of their limbs and maximize their range of motion. Patient is interested in continuing their walking program, patient aims for 2.5 miles.  Since the last visit, they relate improvements in pain previously associated with known exacerbating, aggravating factors and situations. Pharmacologic treatments now include OTC analgesics PRN, but otherwise pharmacologic treatments are minimal. Denies new or worsened numbness, tingling, or focal motor deficit. Denies interval fall, accident, or injury. Denies change in bowel or bladder functioning.  Interval Hx on Sep 14, 2023: presents for follow up. Since last visit, their pain and functioning are overall signficantly better. patient has been having marked relief with topical compound cream. Patient was able to continue with therapy treatments, had to change locations, switched over the [facility] PT doing OT and PT treatments for the past three months. patient offers that their ROM is markedly improved as well in the shoulder and low back. Since the last visit, they relate significant improvements in pain previously associated with known exacerbating, aggravating factors and situations. Pharmacologic treatments now include OTC analgesics PRN, but otherwise pharmacologic treatments are minimal. Denies new or worsened numbness, tingling, or focal motor deficit. Denies interval fall, accident, or injury. Denies change in bowel or bladder functioning. Patient has been able to maintain their walking program, has been ranging between 2.5 miles to 4 miles. patient has walking path, chass near their home. Patient believes their historic challenge with stairs continues, attributes their shortness of breath and their dyspnea to their cardiac condition. Their OT for the right upper limb, specifically for the hand/fingers seems to be lagging behind. Of note, patient had been cleared in 7/2023 by CT Surgery and Orthopedic Surgery for the weight bearing status of the hand.   Interval Hx on Jul 17, 2023: presents for follow up. Since last visit, they underwent further diagnostic workup including additional imaging studies. 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 There have been no significant changes to their aggravating or alleviating factors since the last visit. Patient believes they have been cleared from Orthopedic Surgery standpoint for further weight bearing on the upper limb. Patient intends to have follow up with CT Surgery and Cardiology in order to proceed with PT. Pharmacologic treatments now include OTC analgesics PRN, otherwise pharmacologic treatments are minimal. Denies new or worsened numbness, tingling, or focal motor deficit. Denies interval fall, accident, or injury. Denies change in bowel or bladder functioning. Despite their fall with facial fractures in 3/2023, their cervical degenerative changes have not acutely worsened. Patient has started topical compounds from [facility], helping significantly with their pain and their range of motion.  Initial Hx on 06/29/2023: Presents in person to [facility]. patient relates having been under treatment for cervical spondylosis with Dr. KVNG Reis (Pain). minimal pain over the cervical region, now presenting with more pain over the right shoulder. 3/2023s/p fall, outdoors, trip and fall, fell on their right side, with right facial fractures, s/p right dominant wrist ORIF. The patient's difficulties began years ago, had been under treatments; right shoulder pain worsened after fall. he pain is graded as moderate over the right shoulder. points to the right anterior shoulder. The pain is described as throbbing when it wakes patient from the sleep; reaching forward. The pain is intermittently severe positionally worse (OH). The pain does not radiate, located over the right shoulder. The patient feels that the pain is overall persistent. Patient denies other recent fall, MVA, injury, trauma, or accident besides presenting history above. Aggravating: forward flexion of the right shoulder, abduction of the right shoulder, overhead activity, lifting heavy items. Alleviating: rest, activity modification, avoiding overhead activity, pharmacologic treatments (methocarbamol)  Assoc Hx:' walks 2.5 miles per day Ambulates without assistive device Injection Hx: denies locally directed treatment to the area in question Imaging Hx: reviewed  Level of functioning: indep with ambulation, indep with ADLs Living Situation: dwelling with steps to enter

## 2024-02-29 NOTE — ASSESSMENT
[FreeTextEntry1] : Assessment/Plan:  TOM PATEL is a 68 year male with right here for follow up  Lumbosacral spondylosis without myeloapathy  Rotator cuff tendinitis, right Adhesive capsulitis, right Spasm of the cervical paraspinous muscles Rhomboid Strain, right Scapular dyskinetic, type II, right  DISH (cervical) Cervical spondylosis (ossification of the posterior longitudinal ligament at C5/6, C6/7) Chronic cervical radiculopathy, C5, C6, right Cervicalgia  H/O AAA H/O atrial fibrillation (s/p ablation, on eliquis) H/O HTN  S/p fall with facial fracture (3/2023)  - Tiers of treatment and management of above diagnosis(es) were discussed with patient - Optimal diet, weight, sleep, and lifestyle management to minimize stress and maximize well being counseling provided - Imaging reviewed and discussed with patient - Reviewed previous encounter notes from 11/29/2021 Dr. KVNG Reis (Pain) - Patient was educated on an appropriate home exercise program, provided with exercise recommendations, all questions answered - Patient may trial acetaminophen 1000mg up to TID PRN moderate to severe pain and to decrease average consumption of NSAIDs - Patient PAUSE FURTHER USE OF topical compound cream to the low back  - Patient was prescribed methocarbamol 750mg 1-2 tabs up to TID PRN muscle spasm, informed of sedative potential, advised to avoid driving, taking the subway, or operating heavy machinery, patient displayed a clear understanding of the plan including medication, its purpose and side effects, all questions answered - encouraged patient to review trigger point injection information materials - Patient was advised to apply cool compresses or warm heat to affected regions PRN - Radiographs of right shoulder, scapula, thoracic reviewed 7/2023 via telemedicine; ordered radiographs of the low back Feb 29, 2024   - Feb 29, 2024: Patient was prescribed medrol dose pack (x1) with written instructions, all questions answered, informed of side effects of the medication.  Possible side effects, including hyperglycemia, GI upset, and GI bleed, reviewed with patient. In agreement with patient that potential pain reduction and anti-inflammatory benefits currently outweigh known side effect profile for oral corticosteroids. Patient instructed to immediately stop medication should she develop any abdominal pain, nausea, vomiting, bloody stools, or BRBPR  - Educated about red flag symptoms including (but not limited to) new, worsened, or persistent: fever greater than 100F, bowel or bladder incontinence, bowel obstipation, inability to void urine, urinary leakage, Severe nausea or vomiting, Worsening numbness, worsening tingling/paresthesias, and/or new or progressive motor weakness; advised to seek immediate medical attention at his nearest Emergency department should they experience any of the above  - Feb 29, 2024: MRI lumbar spine without contrast is indicated given that the pt has not improved with tylenol, ibuprofen, naproxen, meloxicam, they obtained non-diagnostic radiographs, new axial > radicular low back pain, marked functional decline in ADLs, necessary for local treatment, and physical therapy/home exercise program>6 weeks. Patient's imaging is medically necessary to outline targets for locally (interventional) directed treatments and/or guide surgical management.  - Follow up in 1-2 weeks via telemedicine following imaging of the lumbar region, assess effect of the medrol dose pack and need for further pharmacologic treatments.  Generally we shall follow up with patient to assess progress with topical compound for the low back, assess need for trigger point injections, local treatment of the axial spine.  I have personally spent a total of at least 40 minutes preparing, reviewing internal and external records, explaining, counseling, and coordinating care for this patient encounter.  Thank you, Dr(s), for allowing me to participate in the care of your patient. Please do not hesitate to contact me with questions/concerns.  Byron Rain M.D. Sports and Interventional Spine Department of Physical Medicine and Rehabilitation [facility] [address] [place] [address]  Appointments: [phone] Fax: [phone]

## 2024-02-29 NOTE — PHYSICAL EXAM
[FreeTextEntry1] : GEN: NAD, well dressed, well nourished HEENT: NCAT, oropharynx clear CV: S1S2, RRR RESP: on room air, no labored breathing ABD: non distended EXT: well perfused, no calf tenderness  RIGHT SHOULDER: neg effusion neg scars or lacerations or lesions neg increased warmth neg scapular winging neg muscle atrophy  Palpation: no TTP over sterna-clavicular joint no TTP over clavicle no TTP over coracoid process no TTP over sternum now 12/14/23 minimal TTP over AC joint no TTP over humerus no TTP over the spine of the scapula no TTP over the medial border of the scapula, superior angle, inferior angle, lateral border now 12/14/23 minimal TTP over supraspinatus now 12/14/23 minimal TTP over infraspinatus now 12/14/23 minimal  TTP over upper trapezius now 12/14/23 minimal TTP over deltoid muscle no TTP in the axilla neg crepitus with PROM shoulder  AROM: active range of motion limited in planes as below scapulohumeral rhythm is now smooth, appropriate   Abduction 170/170-180 Forward Flexion 160/160-180 Elevation through the plane of the scapula 160/170-180 External Rotation 70/80-90 Internal Rotation 45/ Extension 45/50-60 Adduction 45/50-75  PROM consistent with above  neg sulcus sign neg Neer test neg Coffey test neg Fani's test neg Speed's test neg Yergason's test neg drop arm test neg empty can test neg Bartow's Test neg external rotation lag sign neg lift off sign neg hornblower's sign neg Horizontal adduction test  Sensation to light touch intact over all dermatomes about the shoulder Distal pulses present  LEFT SHOULDER: neg effusion neg scars or lacerations or lesions neg increased warmth neg scapular winging neg muscle atrophy  Palpation: no TTP over sterna-clavicular joint no TTP over clavicle no TTP over coracoid process no TTP over sternum no TTP over AC joint no TTP over humerus no TTP over the spine of the scapula no TTP over the medial border of the scapula, superior angle, inferior angle, lateral border no TTP over supraspinatus no TTP over infraspinatus no TTP over upper trapezius no TTP over deltoid muscle no TTP in the axilla neg crepitus with PROM shoulder  AROM: active range of motion full and painless scapulohumeral rhythm was smooth, appropriate  PROM consistent with above  neg sulcus sign neg Neer test neg Coffey test neg Fani's test neg Speed's test neg Yergason's test neg drop arm test neg empty can test neg Bartow's Test neg external rotation lag sign neg lift off sign neg hornblower's sign neg Horizontal adduction test  Sensation to light touch intact over all dermatomes about the shoulder Distal pulses present  Manual Muscle Testing   	C5 (Shoulder Abduction)        C5 (Elbow Flex)	        C6 (Wrist Ext)    Right	5-/5	                                5/5	                          5/5	        Left	5/5	                                5/5	                          5/5	            	C7 (Elbow Ext)            C7 (Wrist Flex)             C8 (Long Finger Flex)          T1 (Finger Abduct)            Right	5/5	                    5/5                                      5/5	                                      5/5	                                                  Left	5/5	                    5/5                                      5/5	                                      5/5	                                 	C8 (Thumb Ext)            C8 & T1 (Thumb Opp)             Right	5/5	                           5/5	                                                  Left	5/5	                           5/5                               Resisted Internal Rotation Right 5-/5 Left 5-/5  Resisted External Rotation Right 5-/5 Left 5-/5  Gait: Non antalgic  +  reciprocating heel to toe able to stand on toes and heels WITH hand holding Tandem gait intact WITH hand holding Poor single leg standing balance B/L Romberg negative  Inspection: Spine alignment is midline, with no evidence of scoliosis. Iliac crest heights and PSIS heights level.  + Forward head position.  Palpation: There is now 12/14/23 minimal tenderness over the upper traps, middle traps, levator scaps, rhomboids, articular pillars, cervical paraspinals  Cervical ROM: Flexion, extension, side-bending, rotation, limited due to pain with most pain at terminal ROM  Finger to nose bilaterally intact  Tone: Normal. No cog wheeling.  Proprioception at DIPs intact B/L Sensation: Grossly intact to light touch and pinprick bilateral upper extremities. Reflexes: 2+ symmetric biceps, pronators, brachioradialis, triceps Guy's Sign negative Spurling's Sign negative Shoulder Abduction Test (Bakody's) absent Lhermitte's Sign negative  decreased 5th digit MCP flexion   LUMBAR  Gait: ++antalgic +  reciprocating heel to toe unable to stand on toes and heels WITH hand holding/both hands on counter-top unable to Tandem gait intact WITH hand holding Poor single leg standing balance, B/L Romberg negative   Trendelenburg present with LEFT >RIGHT stance leg   Inspection: Spine alignment is midline. Palpation: There is + tenderness over the midline spinous processes, paravertebral muscles of the thoracolumbar region   Lumbar ROM: Flexion, extension, side-bending, rotation, limited in most planes +pain with lateral flexion +pain with oblique extension +pain with lateral rotation   Hip ROM: pain at terminal ROM bilaterally. FAIR, FABERE negative bilaterally   + weakness with resisted external rotation of the hip flexed to 90, knee flexed to 90, and hip externally rotated 20 degrees RIGHT (gluteus medius) + weakness with resisted external rotation of the hip flexed to 90, knee flexed to 90, and hip externally rotated 20 degrees LEFT (gluteus medius)   TEST REGION      Hip Flex   Knee Ext   Ankle Dorsi  EHL   Ankle Plantar Strength Right Side 4/5         5/5                  5/5           4/5              5/5                         Strength Left Side.    3/5         4/5                  4/5           4/5              4/5                           Hip abduction R 4/5 L 4/5 Hip adduction R 4/5 L 4/5 Hip extension R 4/5 L 4/5 Knee Flexion R 4/5 L 4/5   able to perform 10 calf raises on the left able to perform 10 calf raises on the right Harder on neither side   Tone: Normal. No clonus. Sensation: Grossly intact to light touch bilateral lower limbs. Proprioception: Intact at big toes bilaterally. Reflexes: 1+ symmetric knee jerk, ankle jerk. Plantars downgoing bilaterally.   SLR + left  Crossed SLR negative bilaterally. Slump Test + bilaterally   neg prone gapping test neg yeoman neg nachlas B/L active hip extension was more difficult on

## 2024-03-02 ENCOUNTER — OUTPATIENT (OUTPATIENT)
Dept: OUTPATIENT SERVICES | Facility: HOSPITAL | Age: 69
LOS: 1 days | End: 2024-03-02

## 2024-03-02 ENCOUNTER — APPOINTMENT (OUTPATIENT)
Dept: MRI IMAGING | Facility: CLINIC | Age: 69
End: 2024-03-02
Payer: MEDICARE

## 2024-03-02 DIAGNOSIS — Z93.8 OTHER ARTIFICIAL OPENING STATUS: Chronic | ICD-10-CM

## 2024-03-02 PROCEDURE — 72148 MRI LUMBAR SPINE W/O DYE: CPT | Mod: 26,MH

## 2024-03-06 ENCOUNTER — TRANSCRIPTION ENCOUNTER (OUTPATIENT)
Age: 69
End: 2024-03-06

## 2024-03-06 ENCOUNTER — APPOINTMENT (OUTPATIENT)
Dept: NEUROSURGERY | Facility: CLINIC | Age: 69
End: 2024-03-06
Payer: MEDICARE

## 2024-03-06 VITALS
BODY MASS INDEX: 32.07 KG/M2 | DIASTOLIC BLOOD PRESSURE: 76 MMHG | SYSTOLIC BLOOD PRESSURE: 116 MMHG | RESPIRATION RATE: 18 BRPM | OXYGEN SATURATION: 96 % | HEART RATE: 76 BPM | HEIGHT: 70 IN | WEIGHT: 224 LBS

## 2024-03-06 DIAGNOSIS — Z87.19 OTHER SPECIFIED POSTPROCEDURAL STATES: ICD-10-CM

## 2024-03-06 DIAGNOSIS — Z78.9 OTHER SPECIFIED HEALTH STATUS: ICD-10-CM

## 2024-03-06 DIAGNOSIS — Z87.828 PERSONAL HISTORY OF OTHER (HEALED) PHYSICAL INJURY AND TRAUMA: ICD-10-CM

## 2024-03-06 DIAGNOSIS — Z86.19 PERSONAL HISTORY OF OTHER INFECTIOUS AND PARASITIC DISEASES: ICD-10-CM

## 2024-03-06 DIAGNOSIS — Z86.79 PERSONAL HISTORY OF OTHER DISEASES OF THE CIRCULATORY SYSTEM: ICD-10-CM

## 2024-03-06 DIAGNOSIS — Z86.39 PERSONAL HISTORY OF OTHER ENDOCRINE, NUTRITIONAL AND METABOLIC DISEASE: ICD-10-CM

## 2024-03-06 DIAGNOSIS — G93.0 CEREBRAL CYSTS: ICD-10-CM

## 2024-03-06 DIAGNOSIS — Z98.890 OTHER SPECIFIED POSTPROCEDURAL STATES: ICD-10-CM

## 2024-03-06 PROCEDURE — 99202 OFFICE O/P NEW SF 15 MIN: CPT

## 2024-03-06 RX ORDER — CLOTRIMAZOLE 10 MG/G
1 CREAM TOPICAL 3 TIMES DAILY
Qty: 1 | Refills: 1 | Status: DISCONTINUED | COMMUNITY
Start: 2021-11-15 | End: 2024-03-06

## 2024-03-06 RX ORDER — CLOTRIMAZOLE 1% ANTIFUNGAL CREAM 1 G/100G
1 CREAM TOPICAL
Qty: 45 | Refills: 0 | Status: DISCONTINUED | COMMUNITY
Start: 2023-09-11 | End: 2024-03-06

## 2024-03-06 RX ORDER — APIXABAN 5 MG/1
5 TABLET, FILM COATED ORAL
Qty: 60 | Refills: 5 | Status: DISCONTINUED | COMMUNITY
Start: 2022-06-23 | End: 2024-03-06

## 2024-03-06 RX ORDER — PAPAVERINE HYDROCHLORIDE 30 MG/ML
30 INJECTION, SOLUTION INTRAVENOUS
Qty: 5 | Refills: 0 | Status: DISCONTINUED | COMMUNITY
Start: 2023-11-07 | End: 2024-03-06

## 2024-03-06 RX ORDER — SILDENAFIL 100 MG/1
100 TABLET, FILM COATED ORAL
Qty: 90 | Refills: 3 | Status: DISCONTINUED | COMMUNITY
Start: 2022-07-08 | End: 2024-03-06

## 2024-03-06 NOTE — ASSESSMENT
[FreeTextEntry1] : The patient presented today expressing fear of brain cyst, new onset HA to his left forehead and self reported decreased in STM. Prior images and reports were not provided or available. PLAN: - MRI c spine and brain w w/o TBD - RTC to be seen by Dr. Rader - Pt asked to retrieve and bring and images and reports to next visit.

## 2024-03-06 NOTE — PHYSICAL EXAM
[General Appearance - Alert] : alert [General Appearance - In No Acute Distress] : in no acute distress [Oriented To Time, Place, And Person] : oriented to person, place, and time [Impaired Insight] : insight and judgment were intact [Affect] : the affect was normal [Mood] : the mood was normal [Memory Recent] : recent memory was not impaired [Person] : oriented to person [Place] : oriented to place [Time] : oriented to time [Fluency] : fluency intact [Comprehension] : comprehension intact [Reading] : reading intact [Current Events] : adequate knowledge of current events [Past History] : adequate knowledge of personal past history [Vocabulary] : adequate range of vocabulary [Cranial Nerves Optic (II)] : visual acuity intact bilaterally,  pupils equal round and reactive to light [Cranial Nerves Oculomotor (III)] : extraocular motion intact [Cranial Nerves Trigeminal (V)] : facial sensation intact symmetrically [Cranial Nerves Facial (VII)] : face symmetrical [Cranial Nerves Vestibulocochlear (VIII)] : hearing was intact bilaterally [Cranial Nerves Accessory (XI - Cranial And Spinal)] : head turning and shoulder shrug symmetric [Cranial Nerves Hypoglossal (XII)] : there was no tongue deviation with protrusion [Motor Tone] : muscle tone was normal in all four extremities [Motor Strength] : muscle strength was normal in all four extremities [Involuntary Movements] : no involuntary movements were seen [No Muscle Atrophy] : normal bulk in all four extremities [Motor Handedness Right-Handed] : the patient is right hand dominant [5] : S1 ankle flexors 5/5 [Sensation Tactile Decrease] : light touch was intact [4] : S1 toe walking 4/5 [Romberg's Sign] : Romberg's sign was negtive [Abnormal Walk] : normal gait [Balance] : balance was intact [Limited Balance] : balance was intact [Past-pointing] : there was no past-pointing [Tremor] : no tremor present [2+] : Patella left 2+ [___] : absent on the right [___] : absent on the left [FreeTextEntry6] : no right hand or wrist weakness appreciated [Intact] : all sensory within normal limits bilaterally [PERRL With Normal Accommodation] : pupils were equal in size, round, reactive to light, with normal accommodation [Extraocular Movements] : extraocular movements were intact [Full Visual Field] : full visual field

## 2024-03-06 NOTE — REASON FOR VISIT
[New Patient Visit] : a new patient visit [Referred By: _________] : Patient was referred by JESSE [FreeTextEntry1] : right hand weakness and self reports brain cyst

## 2024-03-06 NOTE — HISTORY OF PRESENT ILLNESS
[de-identified] : The patient is a right handed 68 year male who sustained a mechanical fall in March 2023 ( miss stepped walking down stairs) He fell directly on the right side of his face sustaining orbital facial and right-hand fracture. He underwent right ORIF for fracture. 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 The patient does not have the report or disc with him. The patient is known to have cervical spondylosis and lumbar spinal degeneration and has participated in PT. He has also participated in PT for right shoulder stiffness.  TODAY: presents after being referred by a former Dr. Lopez patient. He is concerned that the strength to his right hand has not improved and is worried the "brain cyst" may be causing the symptoms. He stated that his orthopedic doctors have told him the prior right wrist fracture could not be the cause. He also reports newer left sided HA, some decrease in STM. His prior images are at [facility].

## 2024-03-08 ENCOUNTER — APPOINTMENT (OUTPATIENT)
Dept: PHYSICAL MEDICINE AND REHAB | Facility: CLINIC | Age: 69
End: 2024-03-08
Payer: MEDICARE

## 2024-03-08 DIAGNOSIS — R06.6 HICCOUGH: ICD-10-CM

## 2024-03-08 PROCEDURE — G2211 COMPLEX E/M VISIT ADD ON: CPT | Mod: NC

## 2024-03-08 PROCEDURE — 99443: CPT | Mod: 93

## 2024-03-08 RX ORDER — TIZANIDINE 2 MG/1
2 TABLET ORAL
Qty: 30 | Refills: 0 | Status: ACTIVE | COMMUNITY
Start: 2024-03-08 | End: 1900-01-01

## 2024-03-08 RX ORDER — OMEPRAZOLE 20 MG/1
20 CAPSULE, DELAYED RELEASE ORAL DAILY
Qty: 14 | Refills: 0 | Status: ACTIVE | COMMUNITY
Start: 2024-03-08 | End: 1900-01-01

## 2024-03-09 ENCOUNTER — APPOINTMENT (OUTPATIENT)
Dept: MRI IMAGING | Facility: CLINIC | Age: 69
End: 2024-03-09
Payer: MEDICARE

## 2024-03-09 ENCOUNTER — OUTPATIENT (OUTPATIENT)
Dept: OUTPATIENT SERVICES | Facility: HOSPITAL | Age: 69
LOS: 1 days | End: 2024-03-09

## 2024-03-09 DIAGNOSIS — Z93.8 OTHER ARTIFICIAL OPENING STATUS: Chronic | ICD-10-CM

## 2024-03-09 PROCEDURE — 70553 MRI BRAIN STEM W/O & W/DYE: CPT | Mod: 26,MH

## 2024-03-09 PROCEDURE — 72141 MRI NECK SPINE W/O DYE: CPT | Mod: 26,MH

## 2024-03-15 ENCOUNTER — APPOINTMENT (OUTPATIENT)
Dept: NEUROSURGERY | Facility: CLINIC | Age: 69
End: 2024-03-15
Payer: MEDICARE

## 2024-03-15 ENCOUNTER — APPOINTMENT (OUTPATIENT)
Dept: HEART AND VASCULAR | Facility: CLINIC | Age: 69
End: 2024-03-15
Payer: MEDICARE

## 2024-03-15 VITALS
OXYGEN SATURATION: 97 % | RESPIRATION RATE: 18 BRPM | HEIGHT: 70 IN | HEART RATE: 67 BPM | DIASTOLIC BLOOD PRESSURE: 79 MMHG | BODY MASS INDEX: 32.07 KG/M2 | SYSTOLIC BLOOD PRESSURE: 129 MMHG | WEIGHT: 224 LBS

## 2024-03-15 VITALS
HEIGHT: 70 IN | SYSTOLIC BLOOD PRESSURE: 123 MMHG | BODY MASS INDEX: 33.36 KG/M2 | HEART RATE: 75 BPM | TEMPERATURE: 208.22 F | OXYGEN SATURATION: 96 % | WEIGHT: 233 LBS | DIASTOLIC BLOOD PRESSURE: 70 MMHG

## 2024-03-15 DIAGNOSIS — G93.0 CEREBRAL CYSTS: ICD-10-CM

## 2024-03-15 PROCEDURE — 99204 OFFICE O/P NEW MOD 45 MIN: CPT

## 2024-03-15 PROCEDURE — 99213 OFFICE O/P EST LOW 20 MIN: CPT

## 2024-03-15 RX ORDER — METOPROLOL SUCCINATE 50 MG/1
50 TABLET, EXTENDED RELEASE ORAL
Qty: 145 | Refills: 3 | Status: ACTIVE | COMMUNITY
Start: 2018-04-13 | End: 1900-01-01

## 2024-03-15 RX ORDER — GABAPENTIN 100 MG/1
100 CAPSULE ORAL 3 TIMES DAILY
Qty: 90 | Refills: 0 | Status: ACTIVE | COMMUNITY
Start: 2024-03-15 | End: 1900-01-01

## 2024-03-15 RX ORDER — ATORVASTATIN CALCIUM 20 MG/1
20 TABLET, FILM COATED ORAL
Qty: 90 | Refills: 3 | Status: ACTIVE | COMMUNITY
Start: 2019-11-27 | End: 1900-01-01

## 2024-03-15 RX ORDER — APIXABAN 5 MG/1
5 TABLET, FILM COATED ORAL
Qty: 180 | Refills: 3 | Status: ACTIVE | COMMUNITY
Start: 2024-03-15 | End: 1900-01-01

## 2024-03-15 NOTE — PHYSICAL EXAM
[Well Developed] : well developed [Well Nourished] : well nourished [No Acute Distress] : no acute distress [Normal Conjunctiva] : normal conjunctiva [Normal Venous Pressure] : normal venous pressure [No Carotid Bruit] : no carotid bruit [Normal S1, S2] : normal S1, S2 [No Rub] : no rub [No Gallop] : no gallop [Murmur] : murmur [Clear Lung Fields] : clear lung fields [Good Air Entry] : good air entry [No Respiratory Distress] : no respiratory distress  [Soft] : abdomen soft [No Masses/organomegaly] : no masses/organomegaly [Non Tender] : non-tender [Normal Bowel Sounds] : normal bowel sounds [Normal Gait] : normal gait [No Cyanosis] : no cyanosis [No Edema] : no edema [No Clubbing] : no clubbing [No Rash] : no rash [Moves all extremities] : moves all extremities [No Focal Deficits] : no focal deficits [Normal Speech] : normal speech [Alert and Oriented] : alert and oriented [Normal memory] : normal memory [de-identified] : 2/6

## 2024-03-15 NOTE — PHYSICAL EXAM
[General Appearance - Alert] : alert [Person] : oriented to person [General Appearance - In No Acute Distress] : in no acute distress [Place] : oriented to place [Time] : oriented to time [Cranial Nerves Optic (II)] : visual acuity intact bilaterally,  pupils equal round and reactive to light [Cranial Nerves Oculomotor (III)] : extraocular motion intact [Cranial Nerves Trigeminal (V)] : facial sensation intact symmetrically [Cranial Nerves Facial (VII)] : face symmetrical [Cranial Nerves Glossopharyngeal (IX)] : tongue and palate midline [Cranial Nerves Vestibulocochlear (VIII)] : hearing was intact bilaterally [Cranial Nerves Accessory (XI - Cranial And Spinal)] : head turning and shoulder shrug symmetric [Motor Tone] : muscle tone was normal in all four extremities [Cranial Nerves Hypoglossal (XII)] : there was no tongue deviation with protrusion [Motor Strength] : muscle strength was normal in all four extremities [Abnormal Walk] : normal gait [Sensation Tactile Decrease] : light touch was intact [Balance] : balance was intact [PERRL With Normal Accommodation] : pupils were equal in size, round, reactive to light, with normal accommodation [Extraocular Movements] : extraocular movements were intact [Full Visual Field] : full visual field

## 2024-03-19 ENCOUNTER — TRANSCRIPTION ENCOUNTER (OUTPATIENT)
Age: 69
End: 2024-03-19

## 2024-03-19 NOTE — ASSESSMENT
[FreeTextEntry1] : 68 year old male with right hand weakness and degenerative disease of the cervical and lumbar spine.  Plan BUE/BLE EMG for right hand weakness and BLE radiculopathies Neuropsych eval for short term memory decline start gabapentin 100mg. PO TID refrral to Dr. Bush for lumbar injections should his pain flair up again RTC after EMG PT, ordered by his PCP Patient verbalized understanding and agrees with plan  I, Dr. Lopez, personally performed the evaluation and management (E/M) services for this new patient.  That E/M includes conducting the initial examination, assessing all conditions, and establishing the plan of care.  Today, my ACP,  Deborah Mccarty, was here to observe my evaluation and management services for this patient to be followed going forward.    Patient and patient's family verbalize agreement and understanding with plan of care.

## 2024-03-19 NOTE — HISTORY OF PRESENT ILLNESS
[de-identified] : The patient is a right handed 68 year male who sustained a mechanical fall in March 2023 ( miss stepped walking down stairs) He fell directly on the right side of his face sustaining orbital facial and right-hand fracture. He underwent right ORIF for fracture. 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 The patient does not have the report or disc with him. The patient is known to have cervical spondylosis and lumbar spinal degeneration and has participated in PT. He has also participated in PT for right shoulder stiffness.  TODAY: presents after being referred by a former Dr. Lopez patient. He is concerned that the strength to his right hand has not improved and is worried the "brain cyst" may be causing the symptoms. He stated that his orthopedic doctors have told him the prior right wrist fracture could not be the cause. He also reports newer left sided HA, some decrease in STM. His prior images are at [facility]. He acknowledges intermittent left frontal headaches with associated blurred vision bilaterally and dizziness lasting seconds and spontaneously resolve, occurring 3-4 times over the last year. He also reports a slight decline in short term memory over the last few months.   He comes in today with new MRI brain and cervical spine for review.   Denies  nausea/vomiting, abnormal movement of extremities, gait/balance changes, bowel/bladder incontinence.

## 2024-03-19 NOTE — DATA REVIEWED
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

## 2024-03-20 ENCOUNTER — APPOINTMENT (OUTPATIENT)
Dept: PHYSICAL MEDICINE AND REHAB | Facility: CLINIC | Age: 69
End: 2024-03-20
Payer: MEDICARE

## 2024-03-20 VITALS
WEIGHT: 224 LBS | BODY MASS INDEX: 32.07 KG/M2 | HEART RATE: 77 BPM | DIASTOLIC BLOOD PRESSURE: 67 MMHG | HEIGHT: 70 IN | SYSTOLIC BLOOD PRESSURE: 126 MMHG | RESPIRATION RATE: 18 BRPM

## 2024-03-20 DIAGNOSIS — R27.8 OTHER LACK OF COORDINATION: ICD-10-CM

## 2024-03-20 DIAGNOSIS — R29.898 OTHER SYMPTOMS AND SIGNS INVOLVING THE MUSCULOSKELETAL SYSTEM: ICD-10-CM

## 2024-03-20 DIAGNOSIS — M50.10 CERVICAL DISC DISORDER WITH RADICULOPATHY, UNSPECIFIED CERVICAL REGION: ICD-10-CM

## 2024-03-20 PROCEDURE — 95911 NRV CNDJ TEST 9-10 STUDIES: CPT

## 2024-03-20 PROCEDURE — 95886 MUSC TEST DONE W/N TEST COMP: CPT

## 2024-03-20 NOTE — ASSESSMENT
[FreeTextEntry1] :   PLAN AND RECOMMENDATIONS :   We discussed differential diagnosis and clinical impression   Recommend .symptomatic care and support  medications  imaging  referral to  hydrotherapy /heat / cold for pain  continue  relative rest and avoidance of painful activity where possible  increasing activity as discussed  return for follow up.

## 2024-03-20 NOTE — REASON FOR VISIT
[Follow-Up] : a follow-up visit [FreeTextEntry1] : for R hand pain and L cuff pain referred by Dr. Lopez

## 2024-03-20 NOTE — PROCEDURE
[de-identified] : EMG /NERVE CONDUCTION STUDY performed today without complication  Tabulated data, wave forms , conclusions and recommendations are attached and in the procedure report.  Please refer to the scanned study attached to this encounter  Full history and focused clinical exam performed prior to the examination and documented in report

## 2024-03-20 NOTE — HISTORY OF PRESENT ILLNESS
[FreeTextEntry1] :  Mr. TOM PATEL is a very pleasant 68-year male who seen for evaluation of R hand and L cuff pain that has been ongoing for 1 year without any specific injury or inciting event but after fall on March 2023, he missed steps walking down. The pain is located primarily R wrist and L cuff intermittent in nature and described as sharp. The pain is rated as 4/10 during today's visit, and ranges from 4-10/10. The patient's symptoms are aggravated by bending, lying down and alleviated by heat, cold, medication, PT. The patient denies any night pain, numbness/tingling, weakness, or bowel/bladder dysfunction. The patient has no other complaints at this time. He had fracture on his wrist and ORIF surgery. 03/22/2023 MRI brain showed brain cyst. He had occupational PT last year..

## 2024-03-27 ENCOUNTER — TRANSCRIPTION ENCOUNTER (OUTPATIENT)
Age: 69
End: 2024-03-27

## 2024-04-01 ENCOUNTER — TRANSCRIPTION ENCOUNTER (OUTPATIENT)
Age: 69
End: 2024-04-01

## 2024-04-01 RX ORDER — PAPAVERINE HYDROCHLORIDE 30 MG/ML
30 INJECTION, SOLUTION INTRAVENOUS
Qty: 5 | Refills: 1 | Status: ACTIVE | COMMUNITY
Start: 2024-04-01 | End: 1900-01-01

## 2024-04-03 ENCOUNTER — TRANSCRIPTION ENCOUNTER (OUTPATIENT)
Age: 69
End: 2024-04-03

## 2024-04-03 RX ORDER — SILDENAFIL 100 MG/1
100 TABLET, FILM COATED ORAL
Qty: 30 | Refills: 1 | Status: ACTIVE | COMMUNITY
Start: 2024-04-03 | End: 1900-01-01

## 2024-04-03 RX ORDER — TADALAFIL 5 MG/1
5 TABLET ORAL
Qty: 90 | Refills: 3 | Status: ACTIVE | COMMUNITY
Start: 2023-04-12 | End: 1900-01-01

## 2024-04-04 ENCOUNTER — APPOINTMENT (OUTPATIENT)
Dept: PHYSICAL MEDICINE AND REHAB | Facility: CLINIC | Age: 69
End: 2024-04-04
Payer: MEDICARE

## 2024-04-04 PROCEDURE — 95911 NRV CNDJ TEST 9-10 STUDIES: CPT

## 2024-04-04 PROCEDURE — 95886 MUSC TEST DONE W/N TEST COMP: CPT

## 2024-04-12 ENCOUNTER — APPOINTMENT (OUTPATIENT)
Dept: NEUROSURGERY | Facility: CLINIC | Age: 69
End: 2024-04-12
Payer: MEDICARE

## 2024-04-12 VITALS
WEIGHT: 224 LBS | OXYGEN SATURATION: 94 % | HEART RATE: 78 BPM | BODY MASS INDEX: 32.07 KG/M2 | SYSTOLIC BLOOD PRESSURE: 110 MMHG | RESPIRATION RATE: 18 BRPM | DIASTOLIC BLOOD PRESSURE: 67 MMHG | HEIGHT: 70 IN

## 2024-04-12 DIAGNOSIS — G62.9 POLYNEUROPATHY, UNSPECIFIED: ICD-10-CM

## 2024-04-12 PROCEDURE — 99215 OFFICE O/P EST HI 40 MIN: CPT

## 2024-04-15 NOTE — DATA REVIEWED
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 BLE EMG shows mild sensorimotor peripheral polyneuropathy in the BLE and sub-acute left S1 radiculopathy

## 2024-04-15 NOTE — ASSESSMENT
[FreeTextEntry1] : 68 year old male with right hand weakness and degenerative disease of the cervical and lumbar spine. MRI cervical, lumbar and brain reviewed. Mild degenerative disease within the cervical and lumbar spine. Incidental middle fossa aarchnoid cyst.   Plan Follow up with neurology for polyneuropathy, referral provided for Dr. Deutsch No neurosurgical intervention at this time  I, Dr. Lopez, personally performed the evaluation and management (E/M) services for this new patient.  That E/M includes conducting the initial examination, assessing all conditions, and establishing the plan of care.  Today, my ACP,  Deborah Mccarty, was here to observe my evaluation and management services for this patient to be followed going forward.    Patient and patient's family verbalize agreement and understanding with plan of care.

## 2024-04-15 NOTE — HISTORY OF PRESENT ILLNESS
[de-identified] : The patient is a right handed 68 year male who sustained a mechanical fall in March 2023 ( miss stepped walking down stairs) He fell directly on the right side of his face sustaining orbital facial and right-hand fracture. He underwent right ORIF for fracture. 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 The patient does not have the report or disc with him. The patient is known to have cervical spondylosis and lumbar spinal degeneration and has participated in PT. He has also participated in PT for right shoulder stiffness. Initially  presented after being referred by a former Dr. Lopez patient. He is concerned that the strength to his right hand has not improved and is worried the "brain cyst" may be causing the symptoms. He stated that his orthopedic doctors have told him the prior right wrist fracture could not be the cause. He also reports newer left sided HA, some decrease in STM. His prior images are at [facility]. He acknowledges intermittent left frontal headaches with associated blurred vision bilaterally and dizziness lasting seconds and spontaneously resolve, occurring 3-4 times over the last year. He also reports a slight decline in short term memory over the last few months.   Denies  nausea/vomiting, abnormal movement of extremities, gait/balance changes, bowel/bladder incontinence.  He comes in today to review BUE/BLE EMG and MRI brain and cervical spine.  He has stopped taking the gabapentin due to dizziness.

## 2024-04-16 ENCOUNTER — APPOINTMENT (OUTPATIENT)
Dept: PHYSICAL MEDICINE AND REHAB | Facility: CLINIC | Age: 69
End: 2024-04-16
Payer: MEDICARE

## 2024-04-16 DIAGNOSIS — M47.817 SPONDYLOSIS W/OUT MYELOPATHY OR RADICULOPATHY, LUMBOSACRAL REGION: ICD-10-CM

## 2024-04-16 DIAGNOSIS — Q76.49 OTHER CONGENITAL MALFORMATIONS OF SPINE, NOT ASSOCIATED WITH SCOLIOSIS: ICD-10-CM

## 2024-04-16 DIAGNOSIS — M51.36 OTHER INTERVERTEBRAL DISC DEGENERATION, LUMBAR REGION: ICD-10-CM

## 2024-04-16 DIAGNOSIS — R26.9 UNSPECIFIED ABNORMALITIES OF GAIT AND MOBILITY: ICD-10-CM

## 2024-04-16 DIAGNOSIS — M54.17 RADICULOPATHY, LUMBOSACRAL REGION: ICD-10-CM

## 2024-04-16 DIAGNOSIS — M54.16 RADICULOPATHY, LUMBAR REGION: ICD-10-CM

## 2024-04-16 DIAGNOSIS — M62.830 MUSCLE SPASM OF BACK: ICD-10-CM

## 2024-04-16 DIAGNOSIS — M48.10 ANKYLOSING HYPEROSTOSIS [FORESTIER], SITE UNSPECIFIED: ICD-10-CM

## 2024-04-16 DIAGNOSIS — G60.9 HEREDITARY AND IDIOPATHIC NEUROPATHY, UNSPECIFIED: ICD-10-CM

## 2024-04-16 DIAGNOSIS — R29.3 ABNORMAL POSTURE: ICD-10-CM

## 2024-04-16 PROCEDURE — G2211 COMPLEX E/M VISIT ADD ON: CPT | Mod: NC

## 2024-04-16 PROCEDURE — 99443: CPT | Mod: 93

## 2024-05-08 ENCOUNTER — NON-APPOINTMENT (OUTPATIENT)
Age: 69
End: 2024-05-08

## 2024-05-08 ENCOUNTER — APPOINTMENT (OUTPATIENT)
Dept: OPHTHALMOLOGY | Facility: CLINIC | Age: 69
End: 2024-05-08
Payer: MEDICARE

## 2024-05-08 ENCOUNTER — APPOINTMENT (OUTPATIENT)
Dept: INTERNAL MEDICINE | Facility: CLINIC | Age: 69
End: 2024-05-08

## 2024-05-08 PROCEDURE — 92136 OPHTHALMIC BIOMETRY: CPT

## 2024-05-08 PROCEDURE — 92083 EXTENDED VISUAL FIELD XM: CPT

## 2024-05-08 PROCEDURE — 92020 GONIOSCOPY: CPT

## 2024-05-08 PROCEDURE — 92014 COMPRE OPH EXAM EST PT 1/>: CPT

## 2024-05-10 ENCOUNTER — TRANSCRIPTION ENCOUNTER (OUTPATIENT)
Age: 69
End: 2024-05-10

## 2024-05-13 ENCOUNTER — TRANSCRIPTION ENCOUNTER (OUTPATIENT)
Age: 69
End: 2024-05-13

## 2024-05-17 ENCOUNTER — TRANSCRIPTION ENCOUNTER (OUTPATIENT)
Age: 69
End: 2024-05-17

## 2024-05-23 ENCOUNTER — TRANSCRIPTION ENCOUNTER (OUTPATIENT)
Age: 69
End: 2024-05-23

## 2024-05-28 ENCOUNTER — NON-APPOINTMENT (OUTPATIENT)
Age: 69
End: 2024-05-28

## 2024-05-28 ENCOUNTER — APPOINTMENT (OUTPATIENT)
Dept: INTERNAL MEDICINE | Facility: CLINIC | Age: 69
End: 2024-05-28
Payer: MEDICARE

## 2024-05-28 VITALS
OXYGEN SATURATION: 98 % | HEIGHT: 70 IN | SYSTOLIC BLOOD PRESSURE: 127 MMHG | DIASTOLIC BLOOD PRESSURE: 76 MMHG | HEART RATE: 69 BPM | BODY MASS INDEX: 32.64 KG/M2 | WEIGHT: 228 LBS | TEMPERATURE: 207.86 F

## 2024-05-28 DIAGNOSIS — I10 ESSENTIAL (PRIMARY) HYPERTENSION: ICD-10-CM

## 2024-05-28 DIAGNOSIS — Z01.818 ENCOUNTER FOR OTHER PREPROCEDURAL EXAMINATION: ICD-10-CM

## 2024-05-28 DIAGNOSIS — M54.9 DORSALGIA, UNSPECIFIED: ICD-10-CM

## 2024-05-28 DIAGNOSIS — R17 UNSPECIFIED JAUNDICE: ICD-10-CM

## 2024-05-28 DIAGNOSIS — H40.9 UNSPECIFIED GLAUCOMA: ICD-10-CM

## 2024-05-28 PROCEDURE — 99214 OFFICE O/P EST MOD 30 MIN: CPT

## 2024-05-29 ENCOUNTER — TRANSCRIPTION ENCOUNTER (OUTPATIENT)
Age: 69
End: 2024-05-29

## 2024-05-29 PROBLEM — I10 BENIGN ESSENTIAL HYPERTENSION: Status: ACTIVE | Noted: 2018-02-14

## 2024-05-29 PROBLEM — H40.9 GLAUCOMA OF BOTH EYES, UNSPECIFIED GLAUCOMA TYPE: Status: ACTIVE | Noted: 2024-05-29

## 2024-05-29 PROBLEM — Z01.818 PREOP EXAMINATION: Status: ACTIVE | Noted: 2022-10-04

## 2024-05-29 PROBLEM — R17 ELEVATED BILIRUBIN: Status: ACTIVE | Noted: 2022-03-28

## 2024-05-29 PROBLEM — M54.9 BACK PAIN: Status: ACTIVE | Noted: 2021-07-06

## 2024-05-29 NOTE — HISTORY OF PRESENT ILLNESS
[Moderate (4-6 METs)] : Moderate (4-6 METs) [FreeTextEntry4] : 68 yo male with hx lumbar radiculopathy, HTN, empyema sp VATS, total lung decortication, HPL, afib sp ablation, b/l inguinal hernias sp repair, glaucoma, cataracts here for pre op eye surgery.  He recently had exacerbation of low back pain and continues to have difficulty with IADLs, would benefit from assistance with household tasks.  He is seeing PM and R.  Recently, he had BPs elevated at home, BP med changed by cardiologist to losartan- hctz and BPs now improved. He recently had pre op labs done for pre op which showed higher tbili. He has been taking more tylenol due to back pain.

## 2024-05-29 NOTE — PLAN
[FreeTextEntry1] : 70 yo male with hx lumbar radiculopathy, HTN, empyema sp VATS, total lung decortication, HPL, afib sp ablation, b/l inguinal hernias sp repair, glaucoma, cataracts here for pre op eye surgery.  1) glaucoma, cataracts - pre op completed low risk for low risk surgery medically optimized, labs reviewed  2) elevated T bili - reviewed labs, uptrending however has been taking more tylenol.  discussed options including US vs. repeat off tylenol in 4 weeks.  will repeat in 4 weeks and pursue further work up if still elevated.  3) HTN - now well controlled on losartan hctz, cr and electrolytes acceptable.  4) back pain - chronic, following with PM and R, request letter for [facility] signed.

## 2024-05-29 NOTE — PHYSICAL EXAM
[No Acute Distress] : no acute distress [Normal Sclera/Conjunctiva] : normal sclera/conjunctiva [EOMI] : extraocular movements intact [Normal Outer Ear/Nose] : the outer ears and nose were normal in appearance [No JVD] : no jugular venous distention [Supple] : supple [No Respiratory Distress] : no respiratory distress  [No Accessory Muscle Use] : no accessory muscle use [Clear to Auscultation] : lungs were clear to auscultation bilaterally [Normal Rate] : normal rate  [Regular Rhythm] : with a regular rhythm [Normal S1, S2] : normal S1 and S2 [No Focal Deficits] : no focal deficits [Normal Affect] : the affect was normal [Alert and Oriented x3] : oriented to person, place, and time [Normal Insight/Judgement] : insight and judgment were intact

## 2024-05-31 ENCOUNTER — APPOINTMENT (OUTPATIENT)
Dept: UROLOGY | Facility: CLINIC | Age: 69
End: 2024-05-31
Payer: MEDICARE

## 2024-05-31 DIAGNOSIS — N52.01 ERECTILE DYSFUNCTION DUE TO ARTERIAL INSUFFICIENCY: ICD-10-CM

## 2024-05-31 DIAGNOSIS — R31.29 OTHER MICROSCOPIC HEMATURIA: ICD-10-CM

## 2024-05-31 LAB
BILIRUB UR QL STRIP: NORMAL
CLARITY UR: CLEAR
COLLECTION METHOD: NORMAL
GLUCOSE UR-MCNC: NORMAL
HCG UR QL: 0.2 EU/DL
HGB UR QL STRIP.AUTO: NORMAL
KETONES UR-MCNC: NORMAL
LEUKOCYTE ESTERASE UR QL STRIP: NORMAL
NITRITE UR QL STRIP: NORMAL
PH UR STRIP: 7
PROT UR STRIP-MCNC: NORMAL
SP GR UR STRIP: 1.02

## 2024-05-31 PROCEDURE — 99214 OFFICE O/P EST MOD 30 MIN: CPT

## 2024-05-31 RX ORDER — LOSARTAN/HYDROCHLOROTHIAZIDE 100MG-25MG
1 TABLET ORAL
Refills: 0 | DISCHARGE

## 2024-05-31 NOTE — PHYSICAL EXAM
[Normal Appearance] : normal appearance [General Appearance - In No Acute Distress] : no acute distress [Edema] : no peripheral edema [] : no respiratory distress [Abdomen Soft] : soft [Abdomen Tenderness] : non-tender [Abdomen Hernia] : no hernia was discovered [Costovertebral Angle Tenderness] : no ~M costovertebral angle tenderness [Urethral Meatus] : meatus normal [Epididymis] : the epididymides were normal [Testes Tenderness] : no tenderness of the testes [Testes Mass (___cm)] : there were no testicular masses [Prostate Tenderness] : the prostate was not tender [No Prostate Nodules] : no prostate nodules [Prostate Size ___ (0-4)] : prostate size [unfilled] (scale: 0-4) [Normal Station and Gait] : the gait and station were normal for the patient's age [Skin Turgor] : supple [No Focal Deficits] : no focal deficits [Oriented To Time, Place, And Person] : oriented to person, place, and time [Affect] : the affect was normal [Mood] : the mood was normal

## 2024-05-31 NOTE — LETTER BODY
[Dear  ___] : Dear  [unfilled], [Courtesy Letter:] : I had the pleasure of seeing your patient, [unfilled], in my office today. [Please see my note below.] : Please see my note below. [Consult Closing:] : Thank you very much for allowing me to participate in the care of this patient.  If you have any questions, please do not hesitate to contact me. [FreeTextEntry3] : Best Regards,   Dasia Corrigan MD

## 2024-05-31 NOTE — ASU PATIENT PROFILE, ADULT - NS PREOP UNDERSTANDS INFO
No solid food/dairy/candy/gum after midnight today; water/clear apple juice/coffee/tea/Gatorade allowed before 05:30am tomorrow; patient reminder to come with photo ID/insurance/credit card; dress in comfortable clothes; no jewelries/contact lens/valuable; no smoking/alcohol drinking/recreational drug use today; escort to have photo ID; address and callback number was given./yes

## 2024-05-31 NOTE — ASU PATIENT PROFILE, ADULT - NSICDXPASTMEDICALHX_GEN_ALL_CORE_FT
PAST MEDICAL HISTORY:  History of atrial fibrillation     History of duodenal ulcer     Hypertension     Inguinal hernia     CLAIRE (obstructive sleep apnea) Uses dental device    Pleural effusion     Pneumonia

## 2024-05-31 NOTE — ASSESSMENT
[FreeTextEntry1] : We discussed continue use with the tadalafil for the patient's ED after his cataract surgery is complete pleat.  He may also enjoy improved LUTS when he resumes this medication.  As for the prostate evaluation, LISA was negative and PSA was sent.  This will be due to be repeated in 1 year.  As for the trace red blood cells noted on urinalysis today, urine sent for microscopic evaluation culture and cytology we will follow-up as indicated by those results. Dasia Corrigan MD

## 2024-05-31 NOTE — ASU PATIENT PROFILE, ADULT - FALL HARM RISK - UNIVERSAL INTERVENTIONS
Bed in lowest position, wheels locked, appropriate side rails in place/Call bell, personal items and telephone in reach/Instruct patient to call for assistance before getting out of bed or chair/Non-slip footwear when patient is out of bed/[place] to call system/Physically safe environment - no spills, clutter or unnecessary equipment/Purposeful Proactive Rounding/Room/bathroom lighting operational, light cord in reach

## 2024-05-31 NOTE — HISTORY OF PRESENT ILLNESS
[FreeTextEntry1] : 67 YO M seen  3/3/2022 as NPT for inguinal hernia. ED by history. He told me that he had a bilateral inguinal hernia repair by laparoscopy with mesh 12/2019. He also had a left lung decortication 2018 for pneumonia which progressed to empyema. He was caring for his invalid mother who passed last year. After that he noted difficulty achieving and maintaining erection ( for 6 - 8 months, may have been present longer). He has not tried any treatment yet. He has also noted mild right groin discomfort intermittently over the last 2 - 3 months. No problems with urination, Nocturia x 2 - 3.  We discussed possible causes of his ED with low libido and I recommended that we check a full hormonal panel in an AM blood draw. We will also check the PSA at that time. We also discussed treatment of his ED with PDE5i medication and he will consider this option. We reviewed the indications, risks, alternatives and chances for success with this therapy. We will readdress this after we have the results of the blood work. Before prescribing any PDE5i treatment, we will clear this with his Cardiologist, Souleymane Bartholomew.  PSA 0.56 LH 6.4 PRO 6.1 E 2 23 FSH 5.7 TT not done   Patient seen  4/7/2022 to review above. He continues to have sensitivity in the RLQ and right inguinal area. ED continues to be an issue for him. We discussed the patient's continued discomfort in the right inguinal area and right lower quadrant in view of his prior laparoscopic bilateral herniorrhaphy. I recommended that we assess this with a CT scan of the abdomen and pelvis with oral contrast only. He is amenable with this suggestion after we discussed the indications risks alternatives and chance for success and the CT scan is ordered today. As for the evaluation thus far for the patient's ED, I again discussed the use of PDE 5 on medication and recommended he try sildenafil citrate 50 to 100 mg per dose. Because of his cardiac issues and his glaucoma, I recommended that we discussed this with his cardiologist [place] and his ophthalmologist Dr. Matias santana. To that end, I sent messages via [employer] to each of the doctors and asked the patient to contact them also. After we receive confirmation that they have no issues with using that medication then I will order the medicine for him from [facility] as sildenafil 100 mg, 1/2 to 1 tab 1 hour before activity PRN daily x 30 x 3. In my assessment I do not see any problems with use of the medication at this point.   CT scan 5/6/2022: IMPRESSION: Small fat-containing right inguinal hernia. Small to moderate size left inguinal hernia containing non-obstructed loop of small bowel. Enlarged prostate.  Patient seen 7/8/2022 to discuss findings and treatment. Patient states he is doing well overall. He is using a hernia belt for his right sided discomfort, and his pain has improved greatly. He is hesitant to have another surgery, but is open to an evaluation by general surgeon. He is still experiencing difficulty with erections, and would like to try the sildenafil as he has now gotten cardiac clearance from Dr. Bartholomew and opthalmology clearance from Dr. Salcedo. He denies any issues with his urinary symptoms.  UA: negative IPSS: 6 XOCHITL: 12 JOSE: 4  We discussed the findings of the CT scan which showed a right fat containing inguinal hernia and a left bowel containing inguinal hernia. Patient has had relief from hernia belt, however, we believe it would be best for him to evaluated by a general surgeon. He agrees with this plan. We discussed the signs and symptoms of an emergent situation, which include severe pain, nausea, vomiting, fever. If these occur, he understands he should report directly to ED.  As for his erectile dysfunction, patient has cardiac and opthalmology clearance, so we wishes to try sildenafil. We prescribed sildenafil 100 mg 1/2-1 tab to be taken 1 hr before sexual activity on an empty stomach. It was sent to [facility], we informed him how to use the GoodRx coupon. We discussed goals and side effects of medication.  He will follow up in 3 months for re-evaluation of ED, inguinal hernias.  Patient seen  11/9/2022 to reassess his ED and inguinal hernias. He continues sildenafil 100 mg with no improvement with ED He also found the tadalafil 5 mg daily to be unsuccessful.. No issues with urination. Denies dysuria and gross hematuria. He is having an upcoming inguinal hernia repair in December. He is also planning to have a cardiac ablation done for his A-fib, which was recently diagnosed.  UA traced RBC IPSS 9 XOCHITL 6 JOSE 4 Patient on medication for  HTN, closed angle glaucoma, cholesterol. NKMA, allergic to seafood.  The patient denies fevers, chills, nausea and or vomiting and no unexplained weight loss.  All pertinent parts of the patient PFSH (past medical, family and social histories), laboratory, radiological studies and physician notes were reviewed prior to starting the face to face portion of the  visit. Questionnaire results were discussed with patient. We discussed further options for his ED. I recommend that he continue on the tadalafil 5 mg daily, and add sildenafil 100 mg PRN 1 hour before activity. However, given his newly diagnosed a-fib, we discussed waiting until after his hernia suegery and cardiac ablation to initiate this change. I advised him to get cleared by his cardiologist for this regimen. His BP was re-checked manually, 138/88 on BP meds. Regarding the trace RBC which was newly found today, found on UA dip today, we sent a UA micro, culture, and cytology for further evaluation. If all remains stable, he will follow up in 6 months after his inguinal hernia repair.  C+S, cytology negative.  Patient seen  7/12/2023 to reassess. He told me that no longer has nocturia but has noted urinary urgency with some hesitancy and double voiding over the last 2 months. No dysuria or gross hematuria.  He  He also notes improvement in his ED on the combination therapy on tadalafil daily and sildenafil PRN. Not yet 100%, no side effects.  He requests refills of both. Since his last  visit he has had BIH surgery 12/16/2022, Cardiac ablation 1/13/2023, ORIF right wrist after traumatic fall 3/22/2023.  UA negative IPSS 13 XOCHITL 9 JOSE 4 We discussed the patient's shifting lower urinary tract symptoms and I recommended that we do some further studies at this time. To this end of urine was sent for culture and cytology today and plans were made for him to return with a full bladder to perform uroflowmetry and have a pelvic ultrasound. We were unable to draw blood for the PSA test today, he was given a slip to have this done at the phlebotomy lab. As for the ED, medications were renewed we discussed some tips to try and improve the results if he remains with difficulty on double medication, then we will consider penile duplex ultrasound with intracavernous injection therapy as the next step in evaluation and or treatment. We will plan on follow-up based on the above results.  Urine C+S >100,000 CFU/ml E. coli, -99,000 CFU/ml Enterococcus faecalis treated with 5-day course of Cipro PSA 0.72  Patient seen 7/27/2023 to reassess. He completed a 5-day course of Cipro for a recent UTI and reports resolution of irritative voiding symptoms. Regarding the ED, he continues on the combination therapy tadalafil 5mg and sildenafil 100 mg prn, with satisfactory response. UA trace RBC uroflow: avg rate 7.6 ml/s, max rate 15.5 ml/s (voided 223 cc) pelvic sono: PVR 16 cc, prostate 67 cc. He is doing well urologically and no additional intervention is warranted for his stable LUTS. He will continue on the combination therapy for his ED and we will reassess in 3 months, in view of the satisfactory effect. As for the microhematuria, and a urine C+S and cytology are pending. He understands that this finding is likely residual from his recent UTI. We will also reassess the urine in 3 months.   Patient seen 10/31/2023 to reassess. He told me that since taking the Cipro he notes that his urination is better and he has less nocturia. He still has issues with ED and is not able to function satisfactorily even with the daily tadalafil 5 mg and the sildenafil 100 mt PRN taken 1 hour before sexual activity. He requests further evaluation/treatment for this problem. He remains under the care of his cardiologist ad has F/U visits scheduled after his ablation earlier this year. UA trace RBC IPSS 4 Uroflow good: Vol 318 ml, V Max 19 ml/s, V Ave 11 ml/s. PVR 6 ml. Patient's lower urinary tract symptoms remain stable and no further intervention is indicated at this time. As for his ED, we discussed risk factors and I recommended continue with cardiac evaluation and treatment as he plans. We also discussed next steps in evaluation from the urologic standpoint and I recommended that he consider duplex ultrasound examination of the penis with intracavernous injection stimulation. We reviewed the indications risks alternatives and chances for success with this test and the need to stop all other oral PDE 5 RI medication 1 week prior to the testing. He demonstrated interest in proceeding with this test. Medication was ordered and plans were made. C+S negative.  Patient seen 12/1/2023 for DUS with TRI/STD 0.3 ml due to cardiac history.  Penile DUS: Baseline: Left: Concepción 0.2 mm Right: Concepción 0.3 mm 5 min: Total of 0.5 cc Trimix Left: Concepc[place] 0.9 mm PSV 37.3 cm/s EDV 7.5 cm/s Ri 0.80 Right: [place] 0.7 mm PSV 26.6 cm/s EDV 8.5 cm/s Ri 0.68 15 Min: Total of 0.5 cc Trimix Left: [place] 0.8 mm PSV 28.4 cm/s EDV 7.5 cm/s Ri 0.74 Right: [place] 0.8 mm PSV 25.9 cm/s EDV 7.5 cm/s Ri 0.71 30 min: Additional 0.5 cc Trimix injected for a total of 1.0 cc Trimix Left: [place] 0.7 mm PSV 37.3 cm/s EDV 7.9 cm/s Ri 0.79 Right: [place] 0.8 mm PSV 37.1 cm/s EDV 9.4 cm/s Ri 0.75 40 min: Total of 1.0 cc Trimix Left: [place] 0.9 mm PSV 40.0 cm/s EDV 7.7 cm/s Ri 0.81 Right: [place] 0.8 mm PSV 40.7 cm/s EDV 8.7 cm/s Ri 0.79 50 min: Additional 0.5 cc Trimix injected for a total of 1.5 cc Trimix Left: [place] 0.9 mm PSV 36.9 cm/s EDV 7.7 cm/s Ri 0.79 Right: [place] 0.8 mm PSV 38.4 cm/s EDV 9.8 cm/s Ri 0.75 Patient did not achieve rigidity after 1.5 ml f TRI/STD in increments.  He was observed for 30 minutes after last ICI and no further rigidity resulted.  Findings consistent with CVOD,  Findings today from the duplex ultrasound are consistent with a combination of possible arterial insufficiency along with corporal veno-occlusive dysfunction (CVOD). We reviewed the 4-hour rule for a prolonged recurrent erection which is unlikely in this situation along with what to do if it should occur. Options at this point would be either implantation of an IPP, use of the ERIC, or attempt at a trial with of ICI with observation using a stronger medication mixture. The patient is not interested in an IPP due to his concurrent cardiac issues. He is concerned over surgery for this. As for the ERIC and the higher dose of IPP, he would like to explore these further. We will order TRI/CUST #1 (30/1/10) to try with observation at 0.3 ml/injection. While a higher dose would typically be used with this diagnosis, we will start with this lower dose due to his concurrent CAD. We discussed the indications, risks, alternatives and chances for success with this approach in his situation.  Patient seen 1/11/2024 to reassess his ED with Trial with observation using TRI/CUST #1 at 0.3 ml for CVOD with underlying CAD. Medicine TRI/CUST #1 was not received in the office, appointment cancelled for today, medication reordered and staff will call patient when it arrives to reschedule.   Patient seen 2/2/2024 to reassess with trial with observation using TRI/CUST #1 at 0.3 ml. Upon questioning, he reported that he took tadalafil and sildenafil 2 days ago. Rescheduled.  Patient seen 2/9/2024 to reassess with trial with observation using TRI/CUST #1 at 0.3 ml. He confirmed no oral PDE5i medication for 1 week. 8:45 AM TRIMIX #1 0.3 ml injected by me. 8:50  Result 2.5 manual stim added. 9:00  Result 2.5-3.  9:35 Result 2.5-2 10:00 Flaccid Patient is satisfied with the result of the ICI today. He would like to proceed to learn self injection with my technique. We made plans for him to come back for a teaching session including self injection using sterile water and he will then self inject with Trimix No. 1 (30/1/10) at 0.3 cc/inj. I reviewed with him the 4-hour rule concerning recurrence or persistence of an erection later on today and what he should do if that should happen and last more than 4 hours. This is unlikely to happen in this patient.   Patient seen 2/23/2024 to learn ICI with TRI #1 at 0.3 ml/injection. After reviewing ICI technique with the patient in detail, he was given a handout from my book which also included diagrams and a full description of the technique. I then taught him self-injection using my technique which included observing him inject himself with sterile water. When he felt comfortable with the procedure, he was given the remainder of his medication from the office with supplies to try at home. I reviewed with him the 4-hour rule for prolonged erection. We will follow-up as needed or in 3 months to confirm his progress.  4/1/2024 While the TRI #1 seems to work well for this patient, due to cost issues and absence of coverage for this compounded medication, he requested that we return to tadalafil 5 mg daily and sildenafil 100 mg PRN and these were ordered.  Patient seen TODAY 5/31/2024 to reassess. He told me that he stopped the tadalafil pending eye surgery for glaucoma. He noted that since stopping the tadalafil, he has noted a change in his urination with increased LUTS.  UA trace RBC XOCHITL 12

## 2024-05-31 NOTE — ASU PATIENT PROFILE, ADULT - NSICDXPASTSURGICALHX_GEN_ALL_CORE_FT
PAST SURGICAL HISTORY:  History of bilateral YAG laser iridotomy     S/P ablation of atrial fibrillation     S/P bilateral inguinal hernia repair     S/P thoracostomy tube placement      PAST SURGICAL HISTORY:  History of bilateral YAG laser iridotomy     S/P ablation of atrial fibrillation Jan 13, 2023    S/P bilateral inguinal hernia repair     S/P thoracostomy tube placement 2018

## 2024-06-04 ENCOUNTER — APPOINTMENT (OUTPATIENT)
Dept: OPHTHALMOLOGY | Facility: AMBULATORY SURGERY CENTER | Age: 69
End: 2024-06-04

## 2024-06-04 ENCOUNTER — TRANSCRIPTION ENCOUNTER (OUTPATIENT)
Age: 69
End: 2024-06-04

## 2024-06-04 ENCOUNTER — NON-APPOINTMENT (OUTPATIENT)
Age: 69
End: 2024-06-04

## 2024-06-04 ENCOUNTER — OUTPATIENT (OUTPATIENT)
Dept: OUTPATIENT SERVICES | Facility: HOSPITAL | Age: 69
LOS: 1 days | Discharge: HOME | End: 2024-06-04
Payer: MEDICARE

## 2024-06-04 VITALS
TEMPERATURE: 97 F | SYSTOLIC BLOOD PRESSURE: 118 MMHG | HEART RATE: 60 BPM | OXYGEN SATURATION: 97 % | RESPIRATION RATE: 16 BRPM | DIASTOLIC BLOOD PRESSURE: 62 MMHG

## 2024-06-04 VITALS
RESPIRATION RATE: 16 BRPM | HEART RATE: 69 BPM | OXYGEN SATURATION: 97 % | TEMPERATURE: 98 F | WEIGHT: 227.52 LBS | HEIGHT: 70 IN | DIASTOLIC BLOOD PRESSURE: 63 MMHG | SYSTOLIC BLOOD PRESSURE: 118 MMHG

## 2024-06-04 DIAGNOSIS — Z98.890 OTHER SPECIFIED POSTPROCEDURAL STATES: Chronic | ICD-10-CM

## 2024-06-04 DIAGNOSIS — Z93.8 OTHER ARTIFICIAL OPENING STATUS: Chronic | ICD-10-CM

## 2024-06-04 PROCEDURE — 65820 GONIOTOMY: CPT | Mod: RT

## 2024-06-04 PROCEDURE — 66984 XCAPSL CTRC RMVL W/O ECP: CPT | Mod: RT

## 2024-06-04 DEVICE — LENS IOL TECNIS EYHANCE DIB00 18.5D
Type: IMPLANTABLE DEVICE | Site: RIGHT | Status: NON-FUNCTIONAL
Removed: 2024-06-04

## 2024-06-04 DEVICE — RING MALYUGIN STRL 7MM
Type: IMPLANTABLE DEVICE | Site: RIGHT | Status: NON-FUNCTIONAL
Removed: 2024-06-04

## 2024-06-04 RX ORDER — ACETAMINOPHEN 500 MG
1000 TABLET ORAL ONCE
Refills: 0 | Status: DISCONTINUED | OUTPATIENT
Start: 2024-06-04 | End: 2024-06-04

## 2024-06-04 RX ORDER — PHENYLEPHRINE HCL 2.5 %
1 DROPS OPHTHALMIC (EYE)
Refills: 0 | Status: COMPLETED | OUTPATIENT
Start: 2024-06-04 | End: 2024-06-04

## 2024-06-04 RX ORDER — METOPROLOL TARTRATE 50 MG
1 TABLET ORAL
Refills: 0 | DISCHARGE

## 2024-06-04 RX ORDER — TROPICAMIDE 1 %
1 DROPS OPHTHALMIC (EYE)
Refills: 0 | Status: COMPLETED | OUTPATIENT
Start: 2024-06-04 | End: 2024-06-04

## 2024-06-04 RX ORDER — ATORVASTATIN CALCIUM 80 MG/1
1 TABLET, FILM COATED ORAL
Refills: 0 | DISCHARGE

## 2024-06-04 RX ORDER — OFLOXACIN 0.3 %
1 DROPS OPHTHALMIC (EYE)
Refills: 0 | Status: COMPLETED | OUTPATIENT
Start: 2024-06-04 | End: 2024-06-04

## 2024-06-04 RX ORDER — SODIUM CHLORIDE 9 MG/ML
500 INJECTION, SOLUTION INTRAVENOUS
Refills: 0 | Status: DISCONTINUED | OUTPATIENT
Start: 2024-06-04 | End: 2024-06-04

## 2024-06-04 RX ADMIN — Medication 1 DROP(S): at 06:55

## 2024-06-04 RX ADMIN — Medication 1 DROP(S): at 07:01

## 2024-06-04 RX ADMIN — Medication 1 DROP(S): at 06:56

## 2024-06-04 RX ADMIN — Medication 1 DROP(S): at 07:06

## 2024-06-04 NOTE — ASU PREOP CHECKLIST - COMMENTS
no solid food after 19:00 06/03/24, no liquids after 03:00 (Gatorade) with meds, sips of water at 05:00

## 2024-06-04 NOTE — OPERATIVE REPORT - OPERATIVE RPOSRT DETAILS
DATE OF SURGERY: 6/4/24    SURGEON: Cristopher Salcedo MD    ASSISTANT(S): Georgina Mills DO    ANESTHESIA:  MAC, topical, intracameral    PREOPERATIVE DIAGNOSIS(ES):  Cataract, Glaucoma, Right eye.    POSTOPERATIVE DIAGNOSIS(ES):  Cataract, Glaucoma, Right eye.    OPERATION:  Cataract extraction with intraocular lens insertion, Goniotomy, Goniosynchialysis, Right eye.    IMPLANTS:  DIB00 +18.5 diopter lens    COMPLICATIONS:  None.    SPECIMENS:  None.    DESCRIPTION OF PROCEDURE:  The patient was identified in the holding area.  The risks, benefits, and alternatives to surgery were discussed with the patient at length.  Informed consent was obtained.  The right eye was identified and marked.  The patient was then brought to the operating room and placed in the supine position.  The right eye was prepped and draped in the usual sterile fashion for intraocular surgery.  An eyelid speculum was then placed underneath the right eye.    A sideport blade was used to create a paracentesis.  Preservative free 1% lidocaine was injected into the anterior chamber, followed by Viscoat.  A 2.4mm keratome was used to create a temporal clear corneal incision.  A cystotome was used to begin a continuous curvilinear capsulorrhexis, which was finished with Utrata forceps.  Hydrodissection was done with BSS, and the lens was noted to rotate freely in the capsular bag.    Phacoemulsification was accomplished using the divide-and-conquer technique without complications.  Residual cortical material was removed using single handpiece irrigation and aspiration.  Lidocaine 1% was injected into the anterior chamber.  Healon was then injected into the capsular bag.  The DIB00 +18.5 diopter lens was implanted into the capsular bag and rotated into proper position using a Kuglen hook.  Irrigation and aspiration was used to remove any residual cortical material. 360 degree goniosynechialysis was performed with the IA cannula. The lens was centered, and the anterior chamber was deep.    Next, the patient's head and microscope were adjusted to visualize the nasal angle.  Healon was placed on the cornea as coupling agent for the gonioprism lens.  The MVR blade was advanced through the main wound towards the nasal angle.  The blade was used to create a full thickness incision in the trabecular meshwork.  Subsequent passes of the blade were made from untreated to treated area.  The nasal angle was treated approximately 90 degrees.  Mild bleeding was noted.  Irrigation and aspiration was used to remove any anterior chamber blood and residual viscoelastic.   All wounds were hydrated and found to be watertight.      Topical antibiotics and steroids were applied to the surface of the right eye.  The eyelid speculum was removed.  Maxitrol ointment was applied to the surface of the right eye, which was then patched and shielded.  The patient tolerated the procedure well and was brought to the postoperative care unit in stable condition.   DATE OF SURGERY: 6/4/24    SURGEON: Cristopher Salcedo MD    ASSISTANT(S): Georgina Mills DO    ANESTHESIA:  MAC, topical, intracameral    PREOPERATIVE DIAGNOSIS(ES):  Cataract, chronic angle closure Glaucoma, Right eye.    POSTOPERATIVE DIAGNOSIS(ES):  Cataract, chornic angle-closure Glaucoma, Right eye.    OPERATION:  Cataract extraction with intraocular lens insertion, Goniotomy, Goniosynchialysis, Right eye.    IMPLANTS:  DIB00 +18.5 diopter lens    COMPLICATIONS:  None.    SPECIMENS:  None.    DESCRIPTION OF PROCEDURE:  The patient was identified in the holding area.  The risks, benefits, and alternatives to surgery were discussed with the patient at length.  Informed consent was obtained.  The right eye was identified and marked.  The patient was then brought to the operating room and placed in the supine position.  The right eye was prepped and draped in the usual sterile fashion for intraocular surgery.  An eyelid speculum was then placed underneath the right eye.    A sideport blade was used to create a paracentesis.  Preservative free 1% lidocaine was injected into the anterior chamber, followed by Viscoat.  A 2.4mm keratome was used to create a temporal clear corneal incision.  A cystotome was used to begin a continuous curvilinear capsulorrhexis, which was finished with Utrata forceps.  Hydrodissection was done with BSS, and the lens was noted to rotate freely in the capsular bag.    Phacoemulsification was accomplished using the divide-and-conquer technique without complications.  Residual cortical material was removed using single handpiece irrigation and aspiration.  Lidocaine 1% was injected into the anterior chamber.  Healon was then injected into the capsular bag.  The DIB00 +18.5 diopter lens was implanted into the capsular bag and rotated into proper position using a Kuglen hook.  Irrigation and aspiration was used to remove any residual cortical material. 360 degree goniosynechialysis was performed with the IA cannula. The lens was centered, and the anterior chamber was deep.    Next, the patient's head and microscope were adjusted to visualize the nasal angle.  Healon was placed on the cornea as coupling agent for the gonioprism lens.  The MVR blade was advanced through the main wound towards the nasal angle.  The blade was used to create a full thickness incision in the trabecular meshwork.  Subsequent passes of the blade were made from untreated to treated area.  The nasal angle was treated approximately 90 degrees.  Mild bleeding was noted.  Irrigation and aspiration was used to remove any anterior chamber blood and residual viscoelastic.   All wounds were hydrated and found to be watertight.      Topical antibiotics and steroids were applied to the surface of the right eye.  The eyelid speculum was removed.  Maxitrol ointment was applied to the surface of the right eye, which was then patched and shielded.  The patient tolerated the procedure well and was brought to the postoperative care unit in stable condition.   DATE OF SURGERY: 6/4/24    SURGEON: Cristopher Salcedo MD    ASSISTANT(S): Georgina Mills DO    ANESTHESIA:  MAC, topical, intracameral    PREOPERATIVE DIAGNOSIS(ES):  Nuclear sclerosis-Cataract, chronic angle closure Glaucoma-mild , left eye.    POSTOPERATIVE DIAGNOSIS(ES):  Nuclear sclerosis-Cataract, chornic angle-closure Glaucoma-mild, left eye.    OPERATION:  Cataract extraction with intraocular lens insertion, Goniotomy, Goniosynchialysis, left eye.    IMPLANTS:  DIB00 +18.5 diopter lens    COMPLICATIONS:  None.    SPECIMENS:  None.    DESCRIPTION OF PROCEDURE:  The patient was identified in the holding area.  The risks, benefits, and alternatives to surgery were discussed with the patient at length.  Informed consent was obtained.  The right eye was identified and marked.  The patient was then brought to the operating room and placed in the supine position.  The left eye was prepped and draped in the usual sterile fashion for intraocular surgery.  An eyelid speculum was then placed underneath the right eye.    A sideport blade was used to create a paracentesis.  Preservative free 1% lidocaine was injected into the anterior chamber, followed by Viscoat.  A 2.4mm keratome was used to create a temporal clear corneal incision.  A cystotome was used to begin a continuous curvilinear capsulorrhexis, which was finished with Utrata forceps.  Hydrodissection was done with BSS, and the lens was noted to rotate freely in the capsular bag.    Phacoemulsification was accomplished using the divide-and-conquer technique without complications.  Residual cortical material was removed using single handpiece irrigation and aspiration.  Lidocaine 1% was injected into the anterior chamber.  Healon was then injected into the capsular bag.  The DIB00 +18.5 diopter lens was implanted into the capsular bag and rotated into proper position using a Kuglen hook.  Irrigation and aspiration was used to remove any residual cortical material. 360 degree goniosynechialysis was performed with the IA cannula. The lens was centered, and the anterior chamber was deep.    Next, the patient's head and microscope were adjusted to visualize the nasal angle.  Healon was placed on the cornea as coupling agent for the gonioprism lens.  The MVR blade was advanced through the main wound towards the nasal angle.  The blade was used to create a full thickness incision in the trabecular meshwork.  Subsequent passes of the blade were made from untreated to treated area.  The nasal angle was treated approximately 90 degrees.  Mild bleeding was noted.  Irrigation and aspiration was used to remove any anterior chamber blood and residual viscoelastic.   All wounds were hydrated and found to be watertight.      Topical antibiotics and steroids were applied to the surface of the right eye.  The eyelid speculum was removed.  Maxitrol ointment was applied to the surface of the right eye, which was then patched and shielded.  The patient tolerated the procedure well and was brought to the postoperative care unit in stable condition.

## 2024-06-05 ENCOUNTER — APPOINTMENT (OUTPATIENT)
Dept: OPHTHALMOLOGY | Facility: CLINIC | Age: 69
End: 2024-06-05
Payer: MEDICARE

## 2024-06-05 ENCOUNTER — NON-APPOINTMENT (OUTPATIENT)
Age: 69
End: 2024-06-05

## 2024-06-05 PROCEDURE — 99024 POSTOP FOLLOW-UP VISIT: CPT

## 2024-06-07 ENCOUNTER — NON-APPOINTMENT (OUTPATIENT)
Age: 69
End: 2024-06-07

## 2024-06-07 LAB
APPEARANCE: CLEAR
BACTERIA UR CULT: ABNORMAL
BACTERIA: NEGATIVE /HPF
BILIRUBIN URINE: NEGATIVE
BLOOD URINE: NEGATIVE
CAST: 0 /LPF
COLOR: YELLOW
EPITHELIAL CELLS: 1 /HPF
GLUCOSE QUALITATIVE U: NEGATIVE MG/DL
KETONES URINE: NEGATIVE MG/DL
LEUKOCYTE ESTERASE URINE: ABNORMAL
MICROSCOPIC-UA: NORMAL
NITRITE URINE: NEGATIVE
PH URINE: 6.5
PROTEIN URINE: NEGATIVE MG/DL
PSA SERPL-MCNC: 0.73 NG/ML
RED BLOOD CELLS URINE: 2 /HPF
SPECIFIC GRAVITY URINE: 1.01
URINE CYTOLOGY: NORMAL
UROBILINOGEN URINE: 0.2 MG/DL
WHITE BLOOD CELLS URINE: 0 /HPF

## 2024-06-11 PROBLEM — G47.33 OBSTRUCTIVE SLEEP APNEA (ADULT) (PEDIATRIC): Chronic | Status: ACTIVE | Noted: 2024-05-31

## 2024-06-11 PROBLEM — Z86.79 PERSONAL HISTORY OF OTHER DISEASES OF THE CIRCULATORY SYSTEM: Chronic | Status: ACTIVE | Noted: 2024-05-31

## 2024-06-11 PROBLEM — Z87.19 PERSONAL HISTORY OF OTHER DISEASES OF THE DIGESTIVE SYSTEM: Chronic | Status: ACTIVE | Noted: 2024-05-31

## 2024-06-12 ENCOUNTER — NON-APPOINTMENT (OUTPATIENT)
Age: 69
End: 2024-06-12

## 2024-06-12 ENCOUNTER — APPOINTMENT (OUTPATIENT)
Dept: OPHTHALMOLOGY | Facility: CLINIC | Age: 69
End: 2024-06-12
Payer: MEDICARE

## 2024-06-12 PROCEDURE — 99024 POSTOP FOLLOW-UP VISIT: CPT

## 2024-06-14 NOTE — ASU PATIENT PROFILE, ADULT - NSICDXPASTSURGICALHX_GEN_ALL_CORE_FT
PAST SURGICAL HISTORY:  History of bilateral YAG laser iridotomy     S/P ablation of atrial fibrillation Jan 13, 2023    S/P bilateral inguinal hernia repair     S/P cataract extraction right, + Glaucoma    S/P thoracostomy tube placement 2018

## 2024-06-14 NOTE — ASU PATIENT PROFILE, ADULT - NSICDXPASTMEDICALHX_GEN_ALL_CORE_FT
PAST MEDICAL HISTORY:  History of atrial fibrillation     History of duodenal ulcer     Hypertension     Inguinal hernia     CLAIRE (obstructive sleep apnea) Uses dental device    Pleural effusion     Pneumonia      PAST MEDICAL HISTORY:  H/O polyneuropathy     History of atrial fibrillation     History of duodenal ulcer     Hypertension     Inguinal hernia     CLAIRE (obstructive sleep apnea) Uses dental device    Pleural effusion     Pneumonia

## 2024-06-14 NOTE — ASU PATIENT PROFILE, ADULT - VISION (WITH CORRECTIVE LENSES IF THE PATIENT USUALLY WEARS THEM):
uses magnifying glass, uses eyeglasses uses magnifying glass, uses eyeglasses/Normal vision: sees adequately in most situations; can see medication labels, newsprint

## 2024-06-17 ENCOUNTER — TRANSCRIPTION ENCOUNTER (OUTPATIENT)
Age: 69
End: 2024-06-17

## 2024-06-18 ENCOUNTER — TRANSCRIPTION ENCOUNTER (OUTPATIENT)
Age: 69
End: 2024-06-18

## 2024-06-18 ENCOUNTER — OUTPATIENT (OUTPATIENT)
Dept: OUTPATIENT SERVICES | Facility: HOSPITAL | Age: 69
LOS: 1 days | Discharge: HOME | End: 2024-06-18
Payer: MEDICARE

## 2024-06-18 ENCOUNTER — APPOINTMENT (OUTPATIENT)
Dept: OPHTHALMOLOGY | Facility: AMBULATORY SURGERY CENTER | Age: 69
End: 2024-06-18

## 2024-06-18 ENCOUNTER — NON-APPOINTMENT (OUTPATIENT)
Age: 69
End: 2024-06-18

## 2024-06-18 VITALS
DIASTOLIC BLOOD PRESSURE: 56 MMHG | WEIGHT: 230.6 LBS | SYSTOLIC BLOOD PRESSURE: 114 MMHG | RESPIRATION RATE: 16 BRPM | HEART RATE: 64 BPM | TEMPERATURE: 97 F | HEIGHT: 70 IN

## 2024-06-18 VITALS
OXYGEN SATURATION: 100 % | TEMPERATURE: 98 F | HEART RATE: 65 BPM | SYSTOLIC BLOOD PRESSURE: 128 MMHG | DIASTOLIC BLOOD PRESSURE: 45 MMHG | RESPIRATION RATE: 16 BRPM

## 2024-06-18 DIAGNOSIS — Z98.890 OTHER SPECIFIED POSTPROCEDURAL STATES: Chronic | ICD-10-CM

## 2024-06-18 DIAGNOSIS — Z98.49 CATARACT EXTRACTION STATUS, UNSPECIFIED EYE: Chronic | ICD-10-CM

## 2024-06-18 DIAGNOSIS — Z93.8 OTHER ARTIFICIAL OPENING STATUS: Chronic | ICD-10-CM

## 2024-06-18 PROCEDURE — 66984 XCAPSL CTRC RMVL W/O ECP: CPT | Mod: 79,LT

## 2024-06-18 PROCEDURE — 65820 GONIOTOMY: CPT | Mod: 79,LT

## 2024-06-18 DEVICE — LENS IOL TECNIS EYHANCE DIB00 21.0D
Type: IMPLANTABLE DEVICE | Site: LEFT | Status: NON-FUNCTIONAL
Removed: 2024-06-18

## 2024-06-18 RX ORDER — SODIUM CHLORIDE 9 MG/ML
500 INJECTION, SOLUTION INTRAVENOUS
Refills: 0 | Status: DISCONTINUED | OUTPATIENT
Start: 2024-06-18 | End: 2024-06-18

## 2024-06-18 RX ORDER — LOSARTAN/HYDROCHLOROTHIAZIDE 100MG-25MG
1 TABLET ORAL
Refills: 0 | DISCHARGE

## 2024-06-18 RX ORDER — APIXABAN 2.5 MG/1
1 TABLET, FILM COATED ORAL
Refills: 0 | DISCHARGE

## 2024-06-18 RX ORDER — TROPICAMIDE 1 %
1 DROPS OPHTHALMIC (EYE)
Refills: 0 | Status: COMPLETED | OUTPATIENT
Start: 2024-06-18 | End: 2024-06-18

## 2024-06-18 RX ORDER — METOPROLOL TARTRATE 50 MG
1 TABLET ORAL
Refills: 0 | DISCHARGE

## 2024-06-18 RX ORDER — OFLOXACIN 0.3 %
1 DROPS OPHTHALMIC (EYE)
Refills: 0 | Status: COMPLETED | OUTPATIENT
Start: 2024-06-18 | End: 2024-06-18

## 2024-06-18 RX ORDER — METHYLPREDNISOLONE 4 MG
1 TABLET ORAL
Refills: 0 | DISCHARGE

## 2024-06-18 RX ORDER — ACETAMINOPHEN 500 MG
1000 TABLET ORAL ONCE
Refills: 0 | Status: DISCONTINUED | OUTPATIENT
Start: 2024-06-18 | End: 2024-06-18

## 2024-06-18 RX ORDER — PHENYLEPHRINE HCL 2.5 %
1 DROPS OPHTHALMIC (EYE)
Refills: 0 | Status: COMPLETED | OUTPATIENT
Start: 2024-06-18 | End: 2024-06-18

## 2024-06-18 RX ADMIN — Medication 1 DROP(S): at 06:40

## 2024-06-18 RX ADMIN — Medication 1 DROP(S): at 06:30

## 2024-06-18 RX ADMIN — Medication 1 DROP(S): at 06:35

## 2024-06-18 NOTE — OPERATIVE REPORT - OPERATIVE RPOSRT DETAILS
Surgeon; Dr Cristopher Salcedo    ASSISTANT(S):  Dr. Cherry NAQVI    ANESTHESIA:  MAC.    PREOPERATIVE DIAGNOSIS(ES):  Angle-closure Glaucoma and cataract left eye.    POSTOPERATIVE DIAGNOSIS(ES):  Angle- closure Glaucoma and cataract, Right eye.    OPERATION:  Cataract extraction with intraocular lens insertion, goniosynechiolyisis and goniotomy,  Right eye.    IMPLANTS:  DIB00, +21.0diopter lens    COMPLICATIONS:  None.    SPECIMENS:  None.    ESTIMATED BLOOD LOSS: <1 cc    DESCRIPTION OF PROCEDURE:  The patient was identified in the holding area.  The risks, benefits, and alternatives of surgery were discussed with the patient at length.  Informed consent was obtained.  The right eye was identified and marked.  The patient was brought to the operating room and placed in the supine position.  The right eye was prepped and draped in the usual sterile fashion for intraocular surgery, and a lid speculum was then placed underneath the right eye.  A sideport blade was used to create a paracentesis.  Preservative-free 1% lidocaine was injected into the anterior chamber.  An air bubble was injected into anterior chamber followed by trypan blue.  BSS was then injected into the anterior chamber and then washed out with trypan blue.  Viscoat was injected into the anterior chamber.    A 2.4 keratome was used to create a temporal clear corneal incision.  A cystotome was used to begin a continuous curvilinear capsulorrhexis which was finished with Utrata forceps.  Hydrodissection was done with BSS, and the lens was noted to rotate freely in the capsular bag.  Phacoemulsification was accomplished using the divide-and-conquer technique without complications.  Residual cortical material was removed using single handpiece irrigation and aspiration.  Lidocaine 1% was injected into the anterior chamber.  Healon was then injected into the capsular bag.    A DIB00 +21.0 diopter lens was implanted into the capsular bag and rotated into proper position using a Kuglen hook.  A 360 degrees goniosynechiolysis was performed. At this time. the microscope was rotated to view the nasal angle.  An MVR blade was advanced through the main wound and used to create a full-thickness incision into the trabecular meshwork.  Subsequent passes were made with the MVR blade from treated to untreated areas. The nasal angle was treated for approximately 90 degrees.  Mild bleeding was noted.    The patient's head and microscope were then turned back to primary position.  Viscoelastic and heme were removed using irrigation and aspiration along with residual cortical material.  All wounds were hydrated and found to be watertight.  The lens was centered, and the anterior chamber was deep.  Intraocular pressure was kept in the teens at the end of the case to prevent postoperative bleeding.  No complications were noted.  Topical antibiotics and steroids were applied to the surface of the right eye.  The eyelid speculum was removed.  Maxitrol ointment was applied to the surface of the right eye which was then patched and shielded.    The patient tolerated the procedure well and was brought to the postoperative care unit in stable condition.   Surgeon; Dr Cristopher Salcedo    ASSISTANT(S):  Dr. Cherry NAQVI    ANESTHESIA:  MAC.    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    POSTOPERATIVE DIAGNOSIS(ES):  Angle- closure Glauco -mild and cataract -nuclear sclerosis, left eye.    OPERATION:  Cataract extraction with intraocular lens insertion, goniosynechiolyisis and goniotomy, left eye.    IMPLANTS:  DIB00, +21.0diopter lens    COMPLICATIONS:  None.    SPECIMENS:  None.    ESTIMATED BLOOD LOSS: <1 cc    DESCRIPTION OF PROCEDURE:  The patient was identified in the holding area.  The risks, benefits, and alternatives of surgery were discussed with the patient at length.  Informed consent was obtained.  The right eye was identified and marked.  The patient was brought to the operating room and placed in the supine position.  The right eye was prepped and draped in the usual sterile fashion for intraocular surgery, and a lid speculum was then placed underneath the right eye.  A sideport blade was used to create a paracentesis.  Preservative-free 1% lidocaine was injected into the anterior chamber.  An air bubble was injected into anterior chamber followed by trypan blue.  BSS was then injected into the anterior chamber and then washed out with trypan blue.  Viscoat was injected into the anterior chamber.    A 2.4 keratome was used to create a temporal clear corneal incision.  A cystotome was used to begin a continuous curvilinear capsulorrhexis which was finished with Utrata forceps.  Hydrodissection was done with BSS, and the lens was noted to rotate freely in the capsular bag.  Phacoemulsification was accomplished using the divide-and-conquer technique without complications.  Residual cortical material was removed using single handpiece irrigation and aspiration.  Lidocaine 1% was injected into the anterior chamber.  Healon was then injected into the capsular bag.    A DIB00 +21.0 diopter lens was implanted into the capsular bag and rotated into proper position using a Kuglen hook.  A 360 degrees goniosynechiolysis was performed. At this time. the microscope was rotated to view the nasal angle.  An MVR blade was advanced through the main wound and used to create a full-thickness incision into the trabecular meshwork.  Subsequent passes were made with the MVR blade from treated to untreated areas. The nasal angle was treated for approximately 90 degrees.  Mild bleeding was noted.    The patient's head and microscope were then turned back to primary position.  Viscoelastic and heme were removed using irrigation and aspiration along with residual cortical material.  All wounds were hydrated and found to be watertight.  The lens was centered, and the anterior chamber was deep.  Intraocular pressure was kept in the teens at the end of the case to prevent postoperative bleeding.  No complications were noted.  Topical antibiotics and steroids were applied to the surface of the right eye.  The eyelid speculum was removed.  Maxitrol ointment was applied to the surface of the right eye which was then patched and shielded.    The patient tolerated the procedure well and was brought to the postoperative care unit in stable condition.   Surgeon; Dr Cristopher Salcedo    ASSISTANT(S):  Dr. Cherry NAQVI    ANESTHESIA:  MAC.    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    POSTOPERATIVE DIAGNOSIS(ES):  Angle- closure Glaucoma -mild and cataract -nuclear sclerosis, left eye.    OPERATION:  Cataract extraction with intraocular lens insertion, goniosynechiolyisis and goniotomy, left eye.    IMPLANTS:  DIB00, +21.0diopter lens    COMPLICATIONS:  None.    SPECIMENS:  None.    ESTIMATED BLOOD LOSS: <1 cc    DESCRIPTION OF PROCEDURE:  The patient was identified in the holding area.  The risks, benefits, and alternatives of surgery were discussed with the patient at length.  Informed consent was obtained.  The right eye was identified and marked.  The patient was brought to the operating room and placed in the supine position.  The left eye was prepped and draped in the usual sterile fashion for intraocular surgery, and a lid speculum was then placed underneath the left eye.  A sideport blade was used to create a paracentesis.  Preservative-free 1% lidocaine was injected into the anterior chamber.  An air bubble was injected into anterior chamber followed by trypan blue.  BSS was then injected into the anterior chamber and then washed out with trypan blue.  Viscoat was injected into the anterior chamber.    A 2.4 keratome was used to create a temporal clear corneal incision.  A cystotome was used to begin a continuous curvilinear capsulorrhexis which was finished with Utrata forceps.  Hydrodissection was done with BSS, and the lens was noted to rotate freely in the capsular bag.  Phacoemulsification was accomplished using the divide-and-conquer technique without complications.  Residual cortical material was removed using single handpiece irrigation and aspiration.  Lidocaine 1% was injected into the anterior chamber.  Healon was then injected into the capsular bag.    A DIB00 +21.0 diopter lens was implanted into the capsular bag and rotated into proper position using a Kuglen hook.  A 360 degrees goniosynechiolysis was performed. At this time. the microscope was rotated to view the nasal angle.  An MVR blade was advanced through the main wound and used to create a full-thickness incision into the trabecular meshwork.  Subsequent passes were made with the MVR blade from treated to untreated areas. The nasal angle was treated for approximately 90 degrees.  Mild bleeding was noted.    The patient's head and microscope were then turned back to primary position.  Viscoelastic and heme were removed using irrigation and aspiration along with residual cortical material.  All wounds were hydrated and found to be watertight.  The lens was centered, and the anterior chamber was deep.  Intraocular pressure was kept in the teens at the end of the case to prevent postoperative bleeding.  No complications were noted.  Topical antibiotics and steroids were applied to the surface of the right eye.  The eyelid speculum was removed.  Maxitrol ointment was applied to the surface of the right eye which was then patched and shielded.    The patient tolerated the procedure well and was brought to the postoperative care unit in stable condition.

## 2024-06-19 ENCOUNTER — APPOINTMENT (OUTPATIENT)
Dept: OPHTHALMOLOGY | Facility: CLINIC | Age: 69
End: 2024-06-19
Payer: MEDICARE

## 2024-06-19 ENCOUNTER — NON-APPOINTMENT (OUTPATIENT)
Age: 69
End: 2024-06-19

## 2024-06-19 PROBLEM — Z86.69 PERSONAL HISTORY OF OTHER DISEASES OF THE NERVOUS SYSTEM AND SENSE ORGANS: Chronic | Status: ACTIVE | Noted: 2024-06-18

## 2024-06-19 PROCEDURE — 99024 POSTOP FOLLOW-UP VISIT: CPT

## 2024-06-26 ENCOUNTER — APPOINTMENT (OUTPATIENT)
Dept: OPHTHALMOLOGY | Facility: CLINIC | Age: 69
End: 2024-06-26
Payer: MEDICARE

## 2024-06-26 ENCOUNTER — NON-APPOINTMENT (OUTPATIENT)
Age: 69
End: 2024-06-26

## 2024-06-26 PROCEDURE — 99024 POSTOP FOLLOW-UP VISIT: CPT

## 2024-07-01 ENCOUNTER — TRANSCRIPTION ENCOUNTER (OUTPATIENT)
Age: 69
End: 2024-07-01

## 2024-07-16 ENCOUNTER — RESULT REVIEW (OUTPATIENT)
Age: 69
End: 2024-07-16

## 2024-07-16 ENCOUNTER — OUTPATIENT (OUTPATIENT)
Dept: OUTPATIENT SERVICES | Facility: HOSPITAL | Age: 69
LOS: 1 days | End: 2024-07-16
Payer: MEDICARE

## 2024-07-16 ENCOUNTER — APPOINTMENT (OUTPATIENT)
Dept: PHYSICAL MEDICINE AND REHAB | Facility: CLINIC | Age: 69
End: 2024-07-16
Payer: MEDICARE

## 2024-07-16 VITALS
BODY MASS INDEX: 32.64 KG/M2 | SYSTOLIC BLOOD PRESSURE: 130 MMHG | TEMPERATURE: 207.86 F | DIASTOLIC BLOOD PRESSURE: 71 MMHG | OXYGEN SATURATION: 95 % | WEIGHT: 228 LBS | HEIGHT: 70 IN | HEART RATE: 73 BPM

## 2024-07-16 DIAGNOSIS — M79.89 OTHER SPECIFIED SOFT TISSUE DISORDERS: ICD-10-CM

## 2024-07-16 DIAGNOSIS — Z98.890 OTHER SPECIFIED POSTPROCEDURAL STATES: Chronic | ICD-10-CM

## 2024-07-16 DIAGNOSIS — M76.892 OTHER SPECIFIED ENTHESOPATHIES OF LEFT LOWER LIMB, EXCLUDING FOOT: ICD-10-CM

## 2024-07-16 DIAGNOSIS — Z98.49 CATARACT EXTRACTION STATUS, UNSPECIFIED EYE: Chronic | ICD-10-CM

## 2024-07-16 DIAGNOSIS — Z93.8 OTHER ARTIFICIAL OPENING STATUS: Chronic | ICD-10-CM

## 2024-07-16 DIAGNOSIS — M76.891 OTHER SPECIFIED ENTHESOPATHIES OF RIGHT LOWER LIMB, EXCLUDING FOOT: ICD-10-CM

## 2024-07-16 PROCEDURE — 93970 EXTREMITY STUDY: CPT

## 2024-07-16 PROCEDURE — 99215 OFFICE O/P EST HI 40 MIN: CPT

## 2024-07-16 PROCEDURE — 93970 EXTREMITY STUDY: CPT | Mod: 26

## 2024-07-16 PROCEDURE — 73590 X-RAY EXAM OF LOWER LEG: CPT | Mod: 26,50

## 2024-07-16 PROCEDURE — 73564 X-RAY EXAM KNEE 4 OR MORE: CPT | Mod: 26,50

## 2024-07-16 PROCEDURE — 73564 X-RAY EXAM KNEE 4 OR MORE: CPT

## 2024-07-16 PROCEDURE — G2211 COMPLEX E/M VISIT ADD ON: CPT

## 2024-07-16 PROCEDURE — 73590 X-RAY EXAM OF LOWER LEG: CPT

## 2024-07-19 ENCOUNTER — TRANSCRIPTION ENCOUNTER (OUTPATIENT)
Age: 69
End: 2024-07-19

## 2024-07-19 DIAGNOSIS — Q74.2 OTHER CONGENITAL MALFORMATIONS OF LOWER LIMB(S), INCLUDING PELVIC GIRDLE: ICD-10-CM

## 2024-07-19 DIAGNOSIS — G60.9 HEREDITARY AND IDIOPATHIC NEUROPATHY, UNSPECIFIED: ICD-10-CM

## 2024-07-19 DIAGNOSIS — M89.8X6 OTHER SPECIFIED DISORDERS OF BONE, LOWER LEG: ICD-10-CM

## 2024-07-19 DIAGNOSIS — M79.89 OTHER SPECIFIED SOFT TISSUE DISORDERS: ICD-10-CM

## 2024-07-19 DIAGNOSIS — M17.0 BILATERAL PRIMARY OSTEOARTHRITIS OF KNEE: ICD-10-CM

## 2024-07-19 DIAGNOSIS — M51.36 OTHER INTERVERTEBRAL DISC DEGENERATION, LUMBAR REGION: ICD-10-CM

## 2024-07-19 DIAGNOSIS — M47.817 SPONDYLOSIS W/OUT MYELOPATHY OR RADICULOPATHY, LUMBOSACRAL REGION: ICD-10-CM

## 2024-07-19 DIAGNOSIS — M62.830 MUSCLE SPASM OF BACK: ICD-10-CM

## 2024-07-19 DIAGNOSIS — R29.3 ABNORMAL POSTURE: ICD-10-CM

## 2024-07-19 DIAGNOSIS — Q76.49 OTHER CONGENITAL MALFORMATIONS OF SPINE, NOT ASSOCIATED WITH SCOLIOSIS: ICD-10-CM

## 2024-07-22 ENCOUNTER — TRANSCRIPTION ENCOUNTER (OUTPATIENT)
Age: 69
End: 2024-07-22

## 2024-07-22 ENCOUNTER — RESULT REVIEW (OUTPATIENT)
Age: 69
End: 2024-07-22

## 2024-07-23 ENCOUNTER — RX RENEWAL (OUTPATIENT)
Age: 69
End: 2024-07-23

## 2024-07-23 ENCOUNTER — TRANSCRIPTION ENCOUNTER (OUTPATIENT)
Age: 69
End: 2024-07-23

## 2024-07-24 ENCOUNTER — TRANSCRIPTION ENCOUNTER (OUTPATIENT)
Age: 69
End: 2024-07-24

## 2024-07-24 ENCOUNTER — NON-APPOINTMENT (OUTPATIENT)
Age: 69
End: 2024-07-24

## 2024-07-25 DIAGNOSIS — D16.9 BENIGN NEOPLASM OF BONE AND ARTICULAR CARTILAGE, UNSPECIFIED: ICD-10-CM

## 2024-07-27 ENCOUNTER — APPOINTMENT (OUTPATIENT)
Dept: ULTRASOUND IMAGING | Facility: CLINIC | Age: 69
End: 2024-07-27
Payer: MEDICARE

## 2024-07-27 PROCEDURE — 76700 US EXAM ABDOM COMPLETE: CPT | Mod: 26

## 2024-07-29 ENCOUNTER — TRANSCRIPTION ENCOUNTER (OUTPATIENT)
Age: 69
End: 2024-07-29

## 2024-07-30 ENCOUNTER — TRANSCRIPTION ENCOUNTER (OUTPATIENT)
Age: 69
End: 2024-07-30

## 2024-07-31 ENCOUNTER — TRANSCRIPTION ENCOUNTER (OUTPATIENT)
Age: 69
End: 2024-07-31

## 2024-08-09 ENCOUNTER — APPOINTMENT (OUTPATIENT)
Dept: ORTHOPEDIC SURGERY | Facility: CLINIC | Age: 69
End: 2024-08-09

## 2024-08-09 PROCEDURE — 99214 OFFICE O/P EST MOD 30 MIN: CPT

## 2024-08-11 NOTE — DISCUSSION/SUMMARY
[All Questions Answered] : Patient (and family) had all questions answered to an agreeable level of satisfaction [Interested in Proceeding] : Patient (and family) expressed understanding and interest in proceeding with the plan as outlined [de-identified] : Incidental findings of the bilateral knees on imaging. 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 As this is stable for the past few years, it does not need any further workup at this time from a tumor point of view. He may return as needed with any new or worsening pain.  If imaging or pathology/biopsy was ordered, the patient was told to make an appointment to review findings right after all imaging is completed.   We discussed risks, benefits and alternatives. Rationale of care was reviewed and all questions were answered.

## 2024-08-11 NOTE — HISTORY OF PRESENT ILLNESS
[FreeTextEntry1] : This is a 69 year old male who presents with incidentally found right knee lesions, in the setting of a several year history of bilateral knee pain. He has been followed for several years in PMR first by Dr. Mcqueen, now Dr. Rain. He currently complains of some buckling of the knees when walking at times. He localizes pain primarily along the anterior knees and left calf.

## 2024-08-11 NOTE — DISCUSSION/SUMMARY
[All Questions Answered] : Patient (and family) had all questions answered to an agreeable level of satisfaction [Interested in Proceeding] : Patient (and family) expressed understanding and interest in proceeding with the plan as outlined [de-identified] : Incidental findings of the bilateral knees on imaging. 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 As this is stable for the past few years, it does not need any further workup at this time from a tumor point of view. He may return as needed with any new or worsening pain.  If imaging or pathology/biopsy was ordered, the patient was told to make an appointment to review findings right after all imaging is completed.   We discussed risks, benefits and alternatives. Rationale of care was reviewed and all questions were answered.

## 2024-08-11 NOTE — CONSULT LETTER
[Dear  ___] : Dear  [unfilled], [FreeTextEntry2] : Dr. Byron Rain [address], [address] [FreeTextEntry1] : After evaluating your patient and reviewing the studies presented we have come to a mutually agreeable plan.   Please see my note below.  Should you have further questions, please feel free to call and discuss the care of your patient.  Thank you for the confidence of your referral.  Sincerely,  Jacques Eddy MD  Chief, Musculoskeletal Oncology  [phone]

## 2024-08-11 NOTE — ADDENDUM
[FreeTextEntry1] : I, Byron Rosenthal, documented this note as a scribe on behalf of Dr. Jacques Eddy on 08/09/2024.

## 2024-08-11 NOTE — PHYSICAL EXAM
[General Appearance - Well-Appearing] : Well appearing [General Appearance - Well Nourished] : well nourished [Oriented To Time, Place, And Person] : Oriented to person, place, and time [Sclera] : the sclera and conjunctiva were normal [Neck Cervical Mass (___cm)] : no neck mass was observed [Heart Rate And Rhythm] : heart rate was normal and rhythm regular [] : No respiratory distress [Abdomen Soft] : Soft [Normal Station and Gait] : gait and station were normal [FreeTextEntry1] : On exam the patient stands in good balance. He has no palpable areas of tenderness, especially over the tibial tubercle or the proximal fibula. He has good ROM of the knees and ankles. He is neurovascularly intact and no obvious weakness to explain his knee buckling.

## 2024-08-11 NOTE — DATA REVIEWED
[Imaging Present] : Present [de-identified] : XR bilateral knees on 7/16/2024 IMPRESSION: - Prominent enthesophyte formation is seen at the inferior pole of the patella as well as at the tibial tuberosity bilaterally. Small osseous excrescences are noted extending inferiorly from the right fibular head and posterior aspect of the right proximal tibia. These appear to represent small osteochondromas. - There is no fracture or dislocation. There is no tibial or fibular fracture. The knee and ankle joint spaces are preserved.  XR of the right knee from 5/5/2022 show that today's imaging is completely unchanged.

## 2024-08-11 NOTE — END OF VISIT
[FreeTextEntry3] : All medical record entries made by the Scribe were at my, Dr. Jacques Eddy, direction and personally dictated by me on 08/09/2024. I have reviewed the chart and agree that the record accurately reflects my personal performance of the history, physical exam, assessment and plan. I have also personally directed, reviewed, and agreed with the chart.

## 2024-08-11 NOTE — CONSULT LETTER
[Dear  ___] : Dear  [unfilled], [FreeTextEntry2] : Dr. Byron Rain [address], [address] [FreeTextEntry1] : After evaluating your patient and reviewing the studies presented we have come to a mutually agreeable plan.   Please see my note below.  Should you have further questions, please feel free to call and discuss the care of your patient.  Thank you for the confidence of your referral.  Sincerely,  Jacques Eddy MD  Chief, Musculoskeletal Oncology  [phone]

## 2024-08-12 ENCOUNTER — TRANSCRIPTION ENCOUNTER (OUTPATIENT)
Age: 69
End: 2024-08-12

## 2024-08-16 ENCOUNTER — TRANSCRIPTION ENCOUNTER (OUTPATIENT)
Age: 69
End: 2024-08-16

## 2024-08-28 ENCOUNTER — TRANSCRIPTION ENCOUNTER (OUTPATIENT)
Age: 69
End: 2024-08-28

## 2024-09-04 ENCOUNTER — APPOINTMENT (OUTPATIENT)
Dept: NEUROLOGY | Facility: CLINIC | Age: 69
End: 2024-09-04
Payer: MEDICARE

## 2024-09-04 ENCOUNTER — APPOINTMENT (OUTPATIENT)
Dept: HEART AND VASCULAR | Facility: CLINIC | Age: 69
End: 2024-09-04

## 2024-09-04 VITALS
DIASTOLIC BLOOD PRESSURE: 75 MMHG | HEART RATE: 67 BPM | TEMPERATURE: 206.06 F | OXYGEN SATURATION: 99 % | SYSTOLIC BLOOD PRESSURE: 133 MMHG | HEIGHT: 70 IN | WEIGHT: 225 LBS | BODY MASS INDEX: 32.21 KG/M2

## 2024-09-04 DIAGNOSIS — M54.9 DORSALGIA, UNSPECIFIED: ICD-10-CM

## 2024-09-04 PROCEDURE — 95910 NRV CNDJ TEST 7-8 STUDIES: CPT

## 2024-09-04 PROCEDURE — 99204 OFFICE O/P NEW MOD 45 MIN: CPT | Mod: 25

## 2024-09-04 RX ORDER — FOLIC ACID 0.8 MG
500 TABLET ORAL
Refills: 0 | Status: ACTIVE | COMMUNITY

## 2024-09-09 NOTE — CONSULT LETTER
[Dear  ___] : Dear  [unfilled], [Consult Letter:] : I had the pleasure of evaluating your patient, [unfilled]. [Please see my note below.] : Please see my note below. [Consult Closing:] : Thank you very much for allowing me to participate in the care of this patient.  If you have any questions, please do not hesitate to contact me. [Sincerely,] : Sincerely, [FreeTextEntry3] : David Deutsch M.D. Neurology, Electromyography and Neuromuscular Medicine [facility]   of Neurology \[facility]\"" / [facility]

## 2024-09-09 NOTE — ASSESSMENT
[FreeTextEntry1] : Exam and history not consistent with polyneuropathy Nerve conduction studies were repeated today and did not show evidence of polyneuropathy  No nerve root compression on MRI No further neurologic workup or treatment recommended at this time  See separate procedure note for full results of NCS/EMG study

## 2024-09-09 NOTE — PROCEDURE
[FreeTextEntry1] : Nerve Conduction and Electromyography Report [FreeTextEntry3] : Electro Physiologic Findings:  Limb temperature was monitored and maintained at approximately 30 - 34 C in the lower extremities.  The sural and superficial fibular sensory responses were normal bilaterally. The tibial and fibular motor responses, including F-wave latencies, were also normal bilaterally.  Clinical Electrophysiological Impression:   This was a normal nerve conduction study of the lower extremities. There was no evidence of polyneuropathy.

## 2024-09-09 NOTE — CONSULT LETTER
[Dear  ___] : Dear  [unfilled], [Consult Letter:] : I had the pleasure of evaluating your patient, [unfilled]. [Please see my note below.] : Please see my note below. [Consult Closing:] : Thank you very much for allowing me to participate in the care of this patient.  If you have any questions, please do not hesitate to contact me. [Sincerely,] : Sincerely, [FreeTextEntry3] : David Deutsch M.D. Neurology, Electromyography and Neuromuscular Medicine [facility]   of Neurology [facility] / [facility]

## 2024-09-09 NOTE — HISTORY OF PRESENT ILLNESS
[FreeTextEntry1] : Referred by Dr. Lopez for EMG report demonstrating polyneuropathy  He had a fall in 2023, was going downstairs and missed a step. fractured right maxillary bone, right 5th metacarpal and had surgery on this, but still residual difficulty in fully flexing right 5th finger.  In May 2024 he had severe lower back pain, started on gabapentin, got dizzy, stopped it. He took prednisone for a week with improvement. Then took tylenol but bilirubin was elevated and he stopped that as well. PT has helped although still has trouble going up stairs.  He has cramps in the left calf at night.  He denies numbness, tingling in the feet or legs. Some imbalance but no further falls.   Reviewed: NCS/EMG x 2 MRI L spine - mild - moderate foraminal stenosis at L4/5 without nerve root compresison

## 2024-09-09 NOTE — PHYSICAL EXAM
[FreeTextEntry1] : Motor: 5/5 throughout Sensory: vibration mild-mod reduced at toes and minimally reduced at ankles b/l, cold sensation intact b/l LE, light touch and pinprick mild asymmetry no identifiable pattern, somewhat inconsistent Reflexes: 2+ symmetric throughout Gait: narrow base, stable, not ataxic, romberg negative

## 2024-09-09 NOTE — CONSULT LETTER
[Dear  ___] : Dear  [unfilled], [Consult Letter:] : I had the pleasure of evaluating your patient, [unfilled]. [Please see my note below.] : Please see my note below. [Consult Closing:] : Thank you very much for allowing me to participate in the care of this patient.  If you have any questions, please do not hesitate to contact me. [Sincerely,] : Sincerely, [FreeTextEntry3] : David Deutsch M.D. Neurology, Electromyography and Neuromuscular Medicine [facility]   of Neurology [facility] / [facility]

## 2024-09-24 ENCOUNTER — TRANSCRIPTION ENCOUNTER (OUTPATIENT)
Age: 69
End: 2024-09-24

## 2024-09-25 ENCOUNTER — TRANSCRIPTION ENCOUNTER (OUTPATIENT)
Age: 69
End: 2024-09-25

## 2024-09-25 DIAGNOSIS — E78.5 HYPERLIPIDEMIA, UNSPECIFIED: ICD-10-CM

## 2024-09-25 DIAGNOSIS — Z11.59 ENCOUNTER FOR SCREENING FOR OTHER VIRAL DISEASES: ICD-10-CM

## 2024-09-26 ENCOUNTER — APPOINTMENT (OUTPATIENT)
Dept: NEUROLOGY | Facility: CLINIC | Age: 69
End: 2024-09-26

## 2024-09-26 PROCEDURE — 96139 PSYCL/NRPSYC TST TECH EA: CPT

## 2024-09-26 PROCEDURE — 96138 PSYCL/NRPSYC TECH 1ST: CPT

## 2024-09-26 PROCEDURE — 96116 NUBHVL XM PHYS/QHP 1ST HR: CPT

## 2024-09-26 PROCEDURE — 96132 NRPSYC TST EVAL PHYS/QHP 1ST: CPT

## 2024-09-26 PROCEDURE — 96133 NRPSYC TST EVAL PHYS/QHP EA: CPT

## 2024-10-03 ENCOUNTER — APPOINTMENT (OUTPATIENT)
Dept: PHYSICAL MEDICINE AND REHAB | Facility: CLINIC | Age: 69
End: 2024-10-03
Payer: MEDICARE

## 2024-10-03 VITALS
BODY MASS INDEX: 32.21 KG/M2 | HEART RATE: 73 BPM | SYSTOLIC BLOOD PRESSURE: 107 MMHG | HEIGHT: 70 IN | WEIGHT: 225 LBS | OXYGEN SATURATION: 96 % | DIASTOLIC BLOOD PRESSURE: 67 MMHG

## 2024-10-03 DIAGNOSIS — Q76.49 OTHER CONGENITAL MALFORMATIONS OF SPINE, NOT ASSOCIATED WITH SCOLIOSIS: ICD-10-CM

## 2024-10-03 DIAGNOSIS — R26.9 UNSPECIFIED ABNORMALITIES OF GAIT AND MOBILITY: ICD-10-CM

## 2024-10-03 DIAGNOSIS — M62.830 MUSCLE SPASM OF BACK: ICD-10-CM

## 2024-10-03 DIAGNOSIS — R29.3 ABNORMAL POSTURE: ICD-10-CM

## 2024-10-03 DIAGNOSIS — R26.89 OTHER ABNORMALITIES OF GAIT AND MOBILITY: ICD-10-CM

## 2024-10-03 DIAGNOSIS — M67.959 UNSPECIFIED DISORDER OF SYNOVIUM AND TENDON, UNSPECIFIED THIGH: ICD-10-CM

## 2024-10-03 DIAGNOSIS — G60.9 HEREDITARY AND IDIOPATHIC NEUROPATHY, UNSPECIFIED: ICD-10-CM

## 2024-10-03 DIAGNOSIS — M48.10 ANKYLOSING HYPEROSTOSIS [FORESTIER], SITE UNSPECIFIED: ICD-10-CM

## 2024-10-03 DIAGNOSIS — M51.369: ICD-10-CM

## 2024-10-03 DIAGNOSIS — M79.18 MYALGIA, OTHER SITE: ICD-10-CM

## 2024-10-03 PROCEDURE — G2211 COMPLEX E/M VISIT ADD ON: CPT

## 2024-10-03 PROCEDURE — 99215 OFFICE O/P EST HI 40 MIN: CPT

## 2024-10-03 NOTE — DATA REVIEWED
[MRI] : MRI [FreeTextEntry1] : ACC: [id number]     EXAM:  XR LS SPINE FLEX EXT MIN 4V   ORDERED BY: ASHLEIGH YEUNG PROCEDURE DATE:  02/29/2024 INTERPRETATION:  CLINICAL INDICATION: low back pain EXAM: AP neutral flexion extension lateral and bilateral oblique lumbosacral spine from 2/29/2024 at 1425. Compared to appearance on abdomen/pelvis CT from 3/18/2023. IMPRESSION: No compression fractures, spondylolistheses, spondylolysis defects, or evidence for dynamic instability. Anterior L3-L4 disc margin bony spurring/nonbridging ligamentous ossification. Otherwise relatively maintained appearing intervertebral disc spaces. Bilateral SI joint bridging osteophytes. Left iliolumbar ligamentous ossification. Mild bilateral endosteal arthritic changes.  No lytic or blastic lesions.  XR bilateral knees on 7/16/2024 IMPRESSION: - Prominent enthesophyte formation is seen at the inferior pole of the patella as well as at the tibial tuberosity bilaterally. Small osseous excrescences are noted extending inferiorly from the right fibular head and posterior aspect of the right proximal tibia. These appear to represent small osteochondromas. - There is no fracture or dislocation. There is no tibial or fibular fracture. The knee and ankle joint spaces are preserved.  EXAM: [id number] - MR SPINE LUMBAR - ORDERED BY: ASHLEIGH YEUNG PROCEDURE DATE: 03/02/2024 INTERPRETATION: CLINICAL INFORMATION: Declining ADL evidence of new radicular lower back pain. ADDITIONAL CLINICAL INFORMATION: Not Applicable TECHNIQUE: Multiplanar, multisequence MRI was performed of the lumbar spine. IV Contrast: PRIOR STUDIES: No priors available for comparison. FINDINGS: LOCALIZER: No additional findings. BONES: There is no fracture or bone marrow edema. DISCS: There is mild loss of disc height and T2 signal at the L2/L3, L3/L4 and L4/L5 disc spaces consistent with desiccation. ALIGNMENT: The alignment is normal. SACROILIAC JOINTS/SACRUM: There is no sacral fracture. The SI joints are partially visualized and demonstrate anterior osteophyte formation consistent with degenerative changes. CONUS AND CAUDA EQUINA: The distal cord and conus are normal in signal. Conus terminates at L1. VISUALIZED INTRAPELVIC/INTRA-ABDOMINAL SOFT TISSUES: Normal. PARASPINAL SOFT TISSUES: Normal. 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 INDIVIDUAL LEVELS: LOWER THORACIC SPINE: No spinal canal or neuroforaminal stenosis. L1-L2: No spinal canal or neuroforaminal stenosis. L2-L3: There is a minimal diffuse disc bulge with a tiny central disc protrusion contacting the ventral thecal sac. The neural foraminal exiting nerve roots are within normal limits There is mild bilateral foraminal narrowing. There is no evidence of spinal canal stenosis. L3-L4:. Is no evidence of a focal disc herniation. The bilateral neuroforamen are patent. The bilateral L3 foraminal exiting nerve roots are within normal limits. There is mild to moderate facet degenerative changes. L4-L5: There is a mild disc bulge contacting the ventral thecal sac. There is a tiny left foraminal annular tear. There is bilateral mild to moderate foraminal narrowing. There is moderate to severe facet and ligamentous hypertrophy. There is no evidence of spinal canal stenosis. L5-S1: There is a small central disc protrusion contacting the ventral thecal sac. There is mild left foraminal narrowing. The bilateral L5 foraminal exiting nerve roots are within normal limits. There is no evidence of spinal canal stenosis. IMPRESSION: Mild degenerative changes of the lumbar spine. No evidence of spinal canal stenosis. No evidence of abnormal signal changes within the spinal cord. No evidence of abnormal signal changes.  EXAM: [id number] - MR SPINE CERVICAL  - ORDERED BY: ASHLEIGH YEUNG PROCEDURE DATE:  06/29/2023 INTERPRETATION:  CERVICAL SPINE MRI CLINICAL INFORMATION: Neck pain. Radiculopathy. TECHNIQUE: Multiplanar, multisequence MRI was obtained of the cervical spine. Comparison is made to 6/10/2021 FINDINGS: DISC LEVEL EVALUATION: Multilevel anterior bridging osteophyte formation is present. C1/2: Moderate arthrosis between the dens and the anterior arch of C1. C2/C3: Fusion of the left-sided facet joint. C3/C4: No spinal canal or foraminal narrowing. C4/C5: Small posterior disc protrusion. Thickening of the posterior longitudinal ligament with ossification within the ligament extending from the level of the C4-C5 disc space through the C6-C7 disc space. The level of the C5 vertebral body this results in indentation upon the central and left aspect of the cord without cord signal abnormality. C5/C6: Moderate-sized posterior disc protrusion along with ossification of the posterior longitudinal ligament resulting in disc and ossified ligament contacting and indenting the left aspect of the cord. C6/C7: Moderate disc height loss with a moderate-sized posterior disc protrusion which along with ossified posterior longitudinal ligament results in indentation of the right aspect of the cord C7/T1: Mild disc height loss with mild to moderate disc bulging and osteophyte formation. Mild foraminal narrowing.  Mild partially imaged multilevel thoracic degenerative disc disease.  ALIGNMENT: Straightening of the normal cervical lordosis. CORD: No cord signal abnormality. MARROW: No bone marrow edema or fracture. IMAGED BRAIN: Normal PERIPHERAL/NECK SOFT TISSUES: Cystic lesion within the left parotid gland with fluid fluid levels that is unchanged compared to the prior study.  IMPRESSION: No change in the appearance of DISH and OPLL with degenerative disc disease resulting in spinal stenosis at C5-C6 and C6-C7 without cord signal abnormality.  --- End of Report ---

## 2024-10-03 NOTE — ASSESSMENT
[FreeTextEntry1] : Assessment/Plan:  TOM PATEL is a 69 year male with right here for follow up  Primary osteoarthritis of both knees (715.16) (M17.0) Fibular abnormality (755.60) (Q74.2) Bilateral tibial pain (729.5) (M89.8X6)  Lumbosacral spondylosis without myelopathy  Rotator cuff tendinitis, right Adhesive capsulitis, right Spasm of the cervical paraspinous muscles Rhomboid Strain, right Scapular dyskinetic, type II, right  DISH (cervical) Cervical spondylosis (ossification of the posterior longitudinal ligament at C5/6, C6/7) Chronic cervical radiculopathy, C5, C6, right Cervicalgia  H/O AAA H/O atrial fibrillation (s/p ablation, on eliquis) H/O HTN  S/p fall with facial fracture (3/2023)  - Tiers of treatment and management of above diagnosis(es) were discussed with patient - Optimal diet, weight, sleep, and lifestyle management to minimize stress and maximize well being counseling provided - Imaging reviewed and discussed with patient - Reviewed previous encounter notes from 11/29/2021 Dr. KVNG Reis (Pain) - Patient was educated on an appropriate home exercise program, provided with exercise recommendations, all questions answered - Oct 03, 2024 advised to participate in aqua therapy program - Patient may trial acetaminophen 1000mg up to TID PRN moderate to severe pain and to decrease average consumption of NSAIDs - Patient PAUSE FURTHER USE OF topical compound cream to the low back  - Patient was prescribed methocarbamol 750mg 1-2 tabs up to TID PRN muscle spasm, informed of sedative potential, advised to avoid driving, taking the subway, or operating heavy machinery, patient displayed a clear understanding of the plan including medication, its purpose and side effects, all questions answered - encouraged patient to review trigger point injection information materials - Patient was advised to apply cool compresses or warm heat to affected regions PRN - Radiographs of right shoulder, scapula, thoracic reviewed 7/2023 via telemedicine; ordered radiographs of the low back Feb 29, 2024  - Jul 16, 2024 ordered radiographs of the B/L Knees, B/L Tibia, reviewed studies 7/25/2024 - Jul 16, 2024 regarding lower limb duplex, left leg swelling, reviewed negative   - Educated about red flag symptoms including (but not limited to) new, worsened, or persistent: fever greater than 100F, bowel or bladder incontinence, bowel obstipation, inability to void urine, urinary leakage, Severe nausea or vomiting, Worsening numbness, worsening tingling/paresthesias, and/or new or progressive motor weakness; advised to seek immediate medical attention at his nearest Emergency department should they experience any of the above  - Follow up in 1-2 months via telemedicine assess progress with HEP, aqua therapy   Generally we shall follow up with patient to assess progress with topical compound for the low back, assess need for trigger point injections, local treatment of the axial spine.  I have personally spent a total of at least 40 minutes preparing, reviewing internal and external records, explaining, counseling, and coordinating care for this patient encounter.  Thank you, Dr(s), for allowing me to participate in the care of your patient. Please do not hesitate to contact me with questions/concerns.  Byron Rain M.D. Sports and Interventional Spine Department of Physical Medicine and Rehabilitation [facility] Physician [facility] [address] [place] [address]  Appointments: [phone] Fax: [phone]

## 2024-10-03 NOTE — HISTORY OF PRESENT ILLNESS
[FreeTextEntry1] : Byron Rain M.D. Sports Medicine and Spine Department of Physical Medicine and Rehabilitation  [facility] [address], [address] [place] [address]  [facility] [place] [facility] [address], [address] [place] [address]  For [place] Appointments Phone: [phone] Fax: [phone]  ----------------------------------------------------------------------------------------------------------------------------------------  PATIENT: TOM PATEL  MRN: [id number]  YOB: 1955  DATE OF SERVICE: Oct 03, 2024   Oct 03, 2024   Dear Drs.  Thank you for referring TOM PATEL to my Sports and Spine practice and office. Enclosed is a copy of the patient's consultation/progress note, which includes my complete assessment and recent studies completed during the patient's evaluation.  If you have questions or have any patients who require nonsurgical, non-opiate management of any sports, spine, or musculoskeletal conditions, please do not hesitate to contact my  at [phone].  I look forward to taking care of your patients along with you.  Sincerely,  Byron Rain MD Sports, Spine, & Regenerative Musculoskeletal Medicine [facility] [place] [facility]                                                    Follow Up Visit CC: pain, arm  HPI:  This is a follow up visit to [facility] [place] [facility].    TOM PATEL presents with the chief complaint as above.    Interval Hx on Oct 03, 2024: presents for follow up. At the last visit, we advised consultation with [facility]. Patient also underwent evaluation with Neurology to discuss EMG consistent with polyneuropathy, repeat EMG on 9/9/2024 unremarkable. patient with consultation with [facility] as well 9/26/20204. patient has been able to ambulate about 4 miles per day, avoiding acetaminophen for the past four months, patient has been able to navigate [place] better [place] subway. patient has been back to PT, able to demonstrate exercises with PT in office today including single leg movements. they believe their balance is better, notices better cervical ROM with persistent limitation of left > right lateral rotation and flexion. patient has invested in home PT "table" in order to continue home exercises there. patient has been doing consistent thoracic ROM, scapular mobility.  Since the last visit, they relate significant improvements in pain previously associated with known exacerbating, aggravating factors and situations. Given dearth of symptoms, pharmacologic treatments are now minimal. Denies new or worsened numbness, tingling, or focal motor deficit. Denies interval fall, accident, or injury. Denies change in bowel or bladder functioning. last session with [facility] was 9/30/2024, citing facility staff issues. plans on swimming program.  7/25/2024: spoke with patient. recounts progress with PT over the past week, marked fatigue with exertion. Patient informed of all relevant imaging findings, all questions were answered including osteochondromas. also relayed negative lower limb duplex. a/p: advised patient to discuss enchondroma with [facility]. Follow up with me as planned in 10/2024. call length 7 minutes.   Interval Hx on Jul 16, 2024: presents for follow up. At the last visit, we recommended PT program, patient extensively details their PT/HEP. patient shares extensive meticulous program at home including investment. Since the last visit, they relate significant improvements in pain previously associated with known exacerbating, aggravating factors and situations. Pharmacologic treatments now include OTC analgesics PRN, but otherwise pharmacologic treatments are minimal. Their dizziness is somewhat better. In June 2024, patient took hiatus from PT/HEP due to B/L glaucoma surgery. Denies new or worsened numbness, tingling, or focal motor deficit. Denies interval fall, accident, or injury. Denies change in bowel or bladder functioning. patient has markedly diminished their tylenol intake following abnormal LFTs, which seems to have helped the studies return closer to baseline. Low interest in resuming gabapentin, patient is interested in maintaining pharmacologic treatments as minimal as possible, writer on board. points to the left knee, left calf region. patient followed with Neurological Surgery, no plans for surgical intervention. patient has resumed daily walking program, about 1 mile per day. last PT session was Jul 15, 2024, [facility] Ila Alexx DPT, [address] [facility], [place] [address] Tel:[phone]. patient had recent follow up with Cardiology, no recent EKG evidence per his recollection during evaluations at [facility].   Meds: denies regular PO pain medications besides PRN tylenol Therapy Program: no recent structured targeted therapy program HEP: doing HEP regularly  Assoc Sx: Denies numbness, Tingling No indication of myelopathic type symptoms Denies Focal motor weakness in the upper or lower limbs Denies New or worsened bowel or bladder incontinence Denies Saddle anesthesia Denies Using Orthotic(s)/Supportive devices They also deny frequent tripping, falling  ROS: A 14 point review of systems was completed. Positive findings are pain as described above. The remaining systems negative.  Prostate Hx: up to date COVID HX: reviewed  Interval Hx on Apr 16, 2024: presents for follow up. Since last visit, they underwent further diagnostic workup including additional imaging studies. Patient informed of all relevant imaging findings, all questions were answered including MRI lumbar spine from 3/5/2024 with lumbar DDD with lumbar spondylosis. patient notes they have had significant follow up with their specialists including new Neurology specialist. patient states they ruled out surgical indications. patient notes that their right dominant, forearm symptoms continue to occur. since their last visit, functioning overall has improved, he is without sharp lumbar region pain, intermittently has "precursor to pain that I felt before, but doesn't materialize." has not had as severe back pain as they previously had. patient notes the compounds continue to be helpful. patient tried gabapentin, stopped due to dizziness ["extremely dizzy"], weaned off after three days. There have been no significant changes to their aggravating or alleviating factors since the last visit. Since the last visit, they relate significant improvements in low back pain previously associated with known exacerbating, aggravating factors and situations. Pharmacologic treatments now include OTC analgesics PRN, otherwise pharmacologic treatments are minimal. Denies new or worsened numbness, tingling, or focal motor deficit. Denies interval fall, accident, or injury. Denies change in bowel or bladder functioning. Low interest in resuming gabapentin, patient is interested in maintaining pharmacologic treatments as minimal as possible, writer on board. We also discussed EMG, and agree with Neurologist AMANDA Deutsch follow up. Patient will follow up with our practice as planned following last visit to review the symptoms about the lower limbs. I have personally spent a total of at least 25 minutes preparing, reviewing internal and external records, explaining, counseling, and coordinating care for this patient encounter.  Interval Hx on Mar 08, 2024: presents for follow up. Since last visit, they underwent further diagnostic workup including additional imaging studies. Patient informed of all relevant imaging findings, all questions were answered including radiographs and MRI lumbar spine including L4/5 disc tear in context of multilevel lumbosacral spondylosis. patient has just started the steroid pack, today is the third dose. patient has slight improvement in pain. patient has noticed slight upset stomach with medrol dose pack, taking famotidine. patient has also been taking tylenol since the last visit as well. patient having hiccups for 2-3 hours for the last past couple of days. patient has been taking steroid in the afternoon around 6pm. Since the last visit, they relate modest improvements in pain previously associated with known exacerbating, aggravating factors and situations. Pharmacologic treatments now include OTC analgesics PRN, otherwise pharmacologic treatments are minimal. Denies new or worsened numbness, tingling, or focal motor deficit. Denies interval fall, accident, or injury. Denies change in bowel or bladder functioning. patient had been attending PT at [facility] in Fall 2023. Patient prescribed 2mg tizanidine for use at bedtime. Patient advised to start 14 day course of omeprazole, sent to their pharmacy. Advised to complete their steroid pack. Patient will follow up with our practice as planned following last visit. I have personally spent a total of at least 25 minutes preparing, reviewing internal and external records, explaining, counseling, and coordinating care for this patient encounter.  Interval Hx on Feb 29, 2024: presents for follow up. Since last visit, patient returns with progressively worsening low back pain. patient carefully describes period of viral illness in January 2024. rates their pain at 10/10. patient points to the left posterior thigh and right mid back as their overall worst pain region. patient has not been able to walk as much as before, patient with marked difficulty with navigating stairs, pain is also particularly worsened with rotation and with sneezing, coughing. patient cannot recall this much axial low back pain previously. patient notes increased reliance on their bath tub DME. Since the last visit, they relate improvements in pain previously associated with known exacerbating, aggravating factors and situations. Pharmacologic treatments now include OTC analgesics PRN, but otherwise pharmacologic treatments are minimal. Denies new or worsened numbness, tingling, or focal motor deficit. Denies interval fall, accident, or injury. Denies change in bowel or bladder functioning. patient has been applying the compound to the lower back on the left and above the waist on the right low back region. patient notes improved blood pressure control since close follow up with Cardiology over the past few weeks, last visit 2/12/2024.   Interval Hx on Dec 14, 2023: presents for follow up. Since last visit, they completed PT with their right shoulder, last sessions 12/7/2023, attended PT for 1-2 times per week. patient has developed further HEP at home, patient has been able to continue many treatments from their PT at home. patient has not been able to swimming. They have intermittent righ inguinal pain, not particularly worsened. patient believes their low back has acutely worsened as well, having difficulty/pain with rotation while lifting items ("possible"), experiences paroxysm of low back pain "punch" of pain intermittently. patient is interested in having similar outcome as their shoulder over the cervical region. patient also notes chronic spine issues among their relatives and ancestors. Patient is interested in optimizing their functional use of their limbs and maximize their range of motion. Patient is interested in continuing their walking program, patient aims for 2.5 miles.  Since the last visit, they relate improvements in pain previously associated with known exacerbating, aggravating factors and situations. Pharmacologic treatments now include OTC analgesics PRN, but otherwise pharmacologic treatments are minimal. Denies new or worsened numbness, tingling, or focal motor deficit. Denies interval fall, accident, or injury. Denies change in bowel or bladder functioning.  Interval Hx on Sep 14, 2023: presents for follow up. Since last visit, their pain and functioning are overall signficantly better. patient has been having marked relief with topical compound cream. Patient was able to continue with therapy treatments, had to change locations, switched over the [facility] PT doing OT and PT treatments for the past three months. patient offers that their ROM is markedly improved as well in the shoulder and low back. Since the last visit, they relate significant improvements in pain previously associated with known exacerbating, aggravating factors and situations. Pharmacologic treatments now include OTC analgesics PRN, but otherwise pharmacologic treatments are minimal. Denies new or worsened numbness, tingling, or focal motor deficit. Denies interval fall, accident, or injury. Denies change in bowel or bladder functioning. Patient has been able to maintain their walking program, has been ranging between 2.5 miles to 4 miles. patient has walking path, [place]. Patient believes their historic challenge with stairs continues, attributes their shortness of breath and their dyspnea to their cardiac condition. Their OT for the right upper limb, specifically for the hand/fingers seems to be lagging behind. Of note, patient had been cleared in 7/2023 by CT Surgery and Orthopedic Surgery for the weight bearing status of the hand.   Interval Hx on Jul 17, 2023: presents for follow up. Since last visit, they underwent further diagnostic workup including additional imaging studies. 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 There have been no significant changes to their aggravating or alleviating factors since the last visit. Patient believes they have been cleared from Orthopedic Surgery standpoint for further weight bearing on the upper limb. Patient intends to have follow up with CT Surgery and Cardiology in order to proceed with PT. Pharmacologic treatments now include OTC analgesics PRN, otherwise pharmacologic treatments are minimal. Denies new or worsened numbness, tingling, or focal motor deficit. Denies interval fall, accident, or injury. Denies change in bowel or bladder functioning. Despite their fall with facial fractures in 3/2023, their cervical degenerative changes have not acutely worsened. Patient has started topical compounds from [employer], helping significantly with their pain and their range of motion.  Initial Hx on 06/29/2023: Presents in person to [facility] patient relates having been under treatment for cervical spondylosis with Dr. KVNG Reis (Pain). minimal pain over the cervical region, now presenting with more pain over the right shoulder. 3/2023s/p fall, outdoors, trip and fall, fell on their right side, with right facial fractures, s/p right dominant wrist ORIF. The patient's difficulties began years ago, had been under treatments; right shoulder pain worsened after fall. he pain is graded as moderate over the right shoulder. points to the right anterior shoulder. The pain is described as throbbing when it wakes patient from the sleep; reaching forward. The pain is intermittently severe positionally worse (OH). The pain does not radiate, located over the right shoulder. The patient feels that the pain is overall persistent. Patient denies other recent fall, MVA, injury, trauma, or accident besides presenting history above. Aggravating: forward flexion of the right shoulder, abduction of the right shoulder, overhead activity, lifting heavy items. Alleviating: rest, activity modification, avoiding overhead activity, pharmacologic treatments (methocarbamol)  Assoc Hx:' walks 2.5 miles per day Ambulates without assistive device Injection Hx: denies locally directed treatment to the area in question Imaging Hx: reviewed  Level of functioning: indep with ambulation, indep with ADLs Living Situation: dwelling with steps to enter

## 2024-10-03 NOTE — PHYSICAL EXAM
[FreeTextEntry1] : GEN: NAD, well dressed, well nourished HEENT: NCAT, oropharynx clear CV: S1S2, RRR RESP: on room air, no labored breathing ABD: non distended EXT: well perfused, no calf tenderness  RIGHT SHOULDER: neg effusion neg scars or lacerations or lesions neg increased warmth neg scapular winging neg muscle atrophy  Palpation: no TTP over sterna-clavicular joint no TTP over clavicle no TTP over coracoid process no TTP over sternum now 12/14/23 minimal TTP over AC joint no TTP over humerus no TTP over the spine of the scapula no TTP over the medial border of the scapula, superior angle, inferior angle, lateral border now 12/14/23 minimal TTP over supraspinatus now 12/14/23 minimal TTP over infraspinatus now 12/14/23 minimal  TTP over upper trapezius now 12/14/23 minimal TTP over deltoid muscle no TTP in the axilla neg crepitus with PROM shoulder  AROM: active range of motion limited in planes as below scapulohumeral rhythm is now smooth, appropriate   Abduction 170/170-180 Forward Flexion 160/160-180 Elevation through the plane of the scapula 160/170-180 External Rotation 70/80-90 Internal Rotation 45/ Extension 45/50-60 Adduction 45/50-75  PROM consistent with above  neg sulcus sign neg Neer test neg Coffey test neg Fani's test neg Speed's test neg Yergason's test neg drop arm test neg empty can test neg Holmes's Test neg external rotation lag sign neg lift off sign neg hornblower's sign neg Horizontal adduction test  Sensation to light touch intact over all dermatomes about the shoulder Distal pulses present  LEFT SHOULDER: neg effusion neg scars or lacerations or lesions neg increased warmth neg scapular winging neg muscle atrophy  Palpation: no TTP over sterna-clavicular joint no TTP over clavicle no TTP over coracoid process no TTP over sternum no TTP over AC joint no TTP over humerus no TTP over the spine of the scapula no TTP over the medial border of the scapula, superior angle, inferior angle, lateral border no TTP over supraspinatus no TTP over infraspinatus no TTP over upper trapezius no TTP over deltoid muscle no TTP in the axilla neg crepitus with PROM shoulder  AROM: active range of motion full and painless scapulohumeral rhythm was smooth, appropriate  PROM consistent with above  neg sulcus sign neg Neer test neg Coffey test neg Fani's test neg Speed's test neg Yergason's test neg drop arm test neg empty can test neg Holmes's Test neg external rotation lag sign neg lift off sign neg hornblower's sign neg Horizontal adduction test  Sensation to light touch intact over all dermatomes about the shoulder Distal pulses present  Manual Muscle Testing   	C5 (Shoulder Abduction)        C5 (Elbow Flex)	        C6 (Wrist Ext)    Right	5-/5	                                5/5	                          5/5	        Left	5/5	                                5/5	                          5/5	            	C7 (Elbow Ext)            C7 (Wrist Flex)             C8 (Long Finger Flex)          T1 (Finger Abduct)            Right	5/5	                    5/5                                      5/5	                                      5/5	                                                  Left	5/5	                    5/5                                      5/5	                                      5/5	                                 	C8 (Thumb Ext)            C8 & T1 (Thumb Opp)             Right	5/5	                           5/5	                                                  Left	5/5	                           5/5                               Resisted Internal Rotation Right 5-/5 Left 5-/5  Resisted External Rotation Right 5-/5 Left 5-/5  Gait: Non antalgic  +  reciprocating heel to toe able to stand on toes and heels WITH hand holding Tandem gait intact WITH hand holding Poor single leg standing balance B/L Romberg negative  Inspection: Spine alignment is midline, with no evidence of scoliosis. Iliac crest heights and PSIS heights level.  + Forward head position.  Palpation: There is now 12/14/23 minimal tenderness over the upper traps, middle traps, levator scaps, rhomboids, articular pillars, cervical paraspinals  Cervical ROM: Flexion, extension, side-bending, rotation, limited due to pain with most pain at terminal ROM  Finger to nose bilaterally intact  Tone: Normal. No cog wheeling.  Proprioception at DIPs intact B/L Sensation: Grossly intact to light touch and pinprick bilateral upper extremities. Reflexes: 2+ symmetric biceps, pronators, brachioradialis, triceps Guy's Sign negative Spurling's Sign negative Shoulder Abduction Test (Bakody's) absent Lhermitte's Sign negative  decreased 5th digit MCP flexion   LUMBAR  Gait: neg antalgic +  reciprocating heel to toe Oct 03, 2024 able stand on toes and heels WITH hand holding/both hands on counter-top Oct 03, 2024 able to Tandem gait intact WITH hand holding FAIR single leg standing balance, B/L Romberg negative   minimal Trendelenburg present with LEFT stance leg   Inspection: Spine alignment is midline. Palpation: There is + tenderness over the midline spinous processes, paravertebral muscles of the thoracolumbar region   Lumbar ROM: Flexion, extension, side-bending, rotation, limited in most planes now 10/03/2024 minimal +pain with lateral flexion now 10/03/2024 minimal +pain with oblique extension now 10/03/2024 minimal +pain with lateral rotation   Hip ROM: pain at terminal ROM bilaterally. FAIR, FABERE negative bilaterally   + weakness with resisted external rotation of the hip flexed to 90, knee flexed to 90, and hip externally rotated 20 degrees RIGHT (gluteus medius) + weakness with resisted external rotation of the hip flexed to 90, knee flexed to 90, and hip externally rotated 20 degrees LEFT (gluteus medius)   TEST REGION      Hip Flex   Knee Ext   Ankle Dorsi  EHL   Ankle Plantar Strength Right Side 4+/5         5/5                  5/5           4/5              5/5                         Strength Left Side.    4+/5         4/5                  4/5           4/5              4/5                           Hip abduction R 4/5 L 4/5 Hip adduction R 4/5 L 4/5 Hip extension R 4/5 L 4/5 Knee Flexion R 4/5 L 4/5   able to perform 10 calf raises on the left able to perform 10 calf raises on the right Harder on neither side   Tone: Normal. No clonus. Sensation: Grossly intact to light touch bilateral lower limbs. Proprioception: Intact at big toes bilaterally. Reflexes: 1+ symmetric knee jerk, ankle jerk. Plantars downgoing bilaterally.   SLR Oct 03, 2024 neg left  Crossed SLR negative bilaterally. Slump Test neg Oct 03, 2024 B/L    neg prone gapping test neg yeoman neg nachlas B/L active hip extension was more difficult on

## 2024-10-04 ENCOUNTER — TRANSCRIPTION ENCOUNTER (OUTPATIENT)
Age: 69
End: 2024-10-04

## 2024-10-10 ENCOUNTER — APPOINTMENT (OUTPATIENT)
Dept: NEUROLOGY | Facility: CLINIC | Age: 69
End: 2024-10-10

## 2024-10-10 ENCOUNTER — APPOINTMENT (OUTPATIENT)
Dept: OPHTHALMOLOGY | Facility: CLINIC | Age: 69
End: 2024-10-10
Payer: MEDICARE

## 2024-10-10 ENCOUNTER — NON-APPOINTMENT (OUTPATIENT)
Age: 69
End: 2024-10-10

## 2024-10-10 PROCEDURE — 92020 GONIOSCOPY: CPT

## 2024-10-10 PROCEDURE — 92250 FUNDUS PHOTOGRAPHY W/I&R: CPT

## 2024-10-10 PROCEDURE — 92014 COMPRE OPH EXAM EST PT 1/>: CPT

## 2024-10-10 PROCEDURE — 92083 EXTENDED VISUAL FIELD XM: CPT

## 2024-10-10 PROCEDURE — 96133 NRPSYC TST EVAL PHYS/QHP EA: CPT

## 2024-10-24 ENCOUNTER — TRANSCRIPTION ENCOUNTER (OUTPATIENT)
Age: 69
End: 2024-10-24

## 2024-10-25 ENCOUNTER — APPOINTMENT (OUTPATIENT)
Dept: UROLOGY | Facility: CLINIC | Age: 69
End: 2024-10-25
Payer: MEDICARE

## 2024-10-25 DIAGNOSIS — N52.01 ERECTILE DYSFUNCTION DUE TO ARTERIAL INSUFFICIENCY: ICD-10-CM

## 2024-10-25 DIAGNOSIS — R39.15 URGENCY OF URINATION: ICD-10-CM

## 2024-10-25 LAB
BILIRUB UR QL STRIP: NORMAL
CLARITY UR: CLEAR
COLLECTION METHOD: NORMAL
GLUCOSE UR-MCNC: NORMAL
HCG UR QL: 0.2 EU/DL
HGB UR QL STRIP.AUTO: NORMAL
KETONES UR-MCNC: NORMAL
LEUKOCYTE ESTERASE UR QL STRIP: NORMAL
NITRITE UR QL STRIP: NORMAL
PH UR STRIP: 7
PROT UR STRIP-MCNC: NORMAL
SP GR UR STRIP: 1.01

## 2024-10-25 PROCEDURE — 99213 OFFICE O/P EST LOW 20 MIN: CPT

## 2024-10-30 ENCOUNTER — APPOINTMENT (OUTPATIENT)
Dept: HEART AND VASCULAR | Facility: CLINIC | Age: 69
End: 2024-10-30
Payer: MEDICARE

## 2024-10-30 VITALS
SYSTOLIC BLOOD PRESSURE: 136 MMHG | HEART RATE: 78 BPM | BODY MASS INDEX: 31.5 KG/M2 | HEIGHT: 70 IN | DIASTOLIC BLOOD PRESSURE: 70 MMHG | TEMPERATURE: 208.76 F | OXYGEN SATURATION: 95 % | WEIGHT: 220 LBS

## 2024-10-30 DIAGNOSIS — I10 ESSENTIAL (PRIMARY) HYPERTENSION: ICD-10-CM

## 2024-10-30 DIAGNOSIS — Q24.4 CONGENITAL SUBAORTIC STENOSIS: ICD-10-CM

## 2024-10-30 DIAGNOSIS — E78.5 HYPERLIPIDEMIA, UNSPECIFIED: ICD-10-CM

## 2024-10-30 DIAGNOSIS — I48.91 UNSPECIFIED ATRIAL FIBRILLATION: ICD-10-CM

## 2024-10-30 PROCEDURE — G2211 COMPLEX E/M VISIT ADD ON: CPT

## 2024-10-30 PROCEDURE — 99214 OFFICE O/P EST MOD 30 MIN: CPT

## 2024-11-25 ENCOUNTER — APPOINTMENT (OUTPATIENT)
Dept: INTERNAL MEDICINE | Facility: CLINIC | Age: 69
End: 2024-11-25
Payer: MEDICARE

## 2024-11-25 VITALS
OXYGEN SATURATION: 98 % | HEIGHT: 70 IN | DIASTOLIC BLOOD PRESSURE: 90 MMHG | TEMPERATURE: 206.96 F | SYSTOLIC BLOOD PRESSURE: 130 MMHG | HEART RATE: 78 BPM | BODY MASS INDEX: 31.64 KG/M2 | WEIGHT: 221 LBS

## 2024-11-25 DIAGNOSIS — B35.3 TINEA PEDIS: ICD-10-CM

## 2024-11-25 DIAGNOSIS — K64.9 UNSPECIFIED HEMORRHOIDS: ICD-10-CM

## 2024-11-25 DIAGNOSIS — Z00.00 ENCOUNTER FOR GENERAL ADULT MEDICAL EXAMINATION W/OUT ABNORMAL FINDINGS: ICD-10-CM

## 2024-11-25 PROCEDURE — G0439: CPT

## 2024-11-25 PROCEDURE — 99213 OFFICE O/P EST LOW 20 MIN: CPT | Mod: 25

## 2024-11-25 RX ORDER — CLOTRIMAZOLE 10 MG/G
1 CREAM TOPICAL
Qty: 1 | Refills: 1 | Status: ACTIVE | COMMUNITY
Start: 2024-11-25 | End: 1900-01-01

## 2024-11-25 RX ORDER — HYDROCORTISONE 25 MG/G
2.5 CREAM TOPICAL
Qty: 1 | Refills: 1 | Status: ACTIVE | COMMUNITY
Start: 2024-11-25 | End: 1900-01-01

## 2024-11-26 ENCOUNTER — RX RENEWAL (OUTPATIENT)
Age: 69
End: 2024-11-26

## 2024-11-26 RX ORDER — AMOXICILLIN 500 MG/1
500 CAPSULE ORAL
Qty: 20 | Refills: 2 | Status: ACTIVE | COMMUNITY
Start: 2024-11-26 | End: 1900-01-01

## 2024-11-27 ENCOUNTER — TRANSCRIPTION ENCOUNTER (OUTPATIENT)
Age: 69
End: 2024-11-27

## 2024-12-19 ENCOUNTER — APPOINTMENT (OUTPATIENT)
Dept: PHYSICAL MEDICINE AND REHAB | Facility: CLINIC | Age: 69
End: 2024-12-19

## 2024-12-19 VITALS
OXYGEN SATURATION: 96 % | DIASTOLIC BLOOD PRESSURE: 75 MMHG | HEIGHT: 70 IN | BODY MASS INDEX: 31.64 KG/M2 | HEART RATE: 68 BPM | WEIGHT: 221 LBS | SYSTOLIC BLOOD PRESSURE: 133 MMHG

## 2024-12-19 DIAGNOSIS — M79.18 MYALGIA, OTHER SITE: ICD-10-CM

## 2024-12-19 DIAGNOSIS — Q76.49 OTHER CONGENITAL MALFORMATIONS OF SPINE, NOT ASSOCIATED WITH SCOLIOSIS: ICD-10-CM

## 2024-12-19 DIAGNOSIS — M67.959 UNSPECIFIED DISORDER OF SYNOVIUM AND TENDON, UNSPECIFIED THIGH: ICD-10-CM

## 2024-12-19 DIAGNOSIS — M62.830 MUSCLE SPASM OF BACK: ICD-10-CM

## 2024-12-19 DIAGNOSIS — M16.11 UNILATERAL PRIMARY OSTEOARTHRITIS, RIGHT HIP: ICD-10-CM

## 2024-12-19 DIAGNOSIS — G60.9 HEREDITARY AND IDIOPATHIC NEUROPATHY, UNSPECIFIED: ICD-10-CM

## 2024-12-19 DIAGNOSIS — M48.10 ANKYLOSING HYPEROSTOSIS [FORESTIER], SITE UNSPECIFIED: ICD-10-CM

## 2024-12-19 DIAGNOSIS — R29.3 ABNORMAL POSTURE: ICD-10-CM

## 2024-12-19 DIAGNOSIS — M24.152 OTHER ARTICULAR CARTILAGE DISORDERS, LEFT HIP: ICD-10-CM

## 2024-12-19 DIAGNOSIS — R26.89 OTHER ABNORMALITIES OF GAIT AND MOBILITY: ICD-10-CM

## 2024-12-19 DIAGNOSIS — R26.9 UNSPECIFIED ABNORMALITIES OF GAIT AND MOBILITY: ICD-10-CM

## 2024-12-19 PROCEDURE — 73502 X-RAY EXAM HIP UNI 2-3 VIEWS: CPT

## 2024-12-19 PROCEDURE — G2211 COMPLEX E/M VISIT ADD ON: CPT

## 2024-12-19 PROCEDURE — 99215 OFFICE O/P EST HI 40 MIN: CPT

## 2024-12-23 ENCOUNTER — TRANSCRIPTION ENCOUNTER (OUTPATIENT)
Age: 69
End: 2024-12-23

## 2024-12-24 DIAGNOSIS — M62.838 OTHER MUSCLE SPASM: ICD-10-CM

## 2024-12-24 DIAGNOSIS — M25.611 STIFFNESS OF RIGHT SHOULDER, NOT ELSEWHERE CLASSIFIED: ICD-10-CM

## 2024-12-24 DIAGNOSIS — M75.81 OTHER SHOULDER LESIONS, RIGHT SHOULDER: ICD-10-CM

## 2024-12-24 DIAGNOSIS — G25.89 OTHER SPECIFIED EXTRAPYRAMIDAL AND MOVEMENT DISORDERS: ICD-10-CM

## 2024-12-24 DIAGNOSIS — M67.911 UNSPECIFIED DISORDER OF SYNOVIUM AND TENDON, RIGHT SHOULDER: ICD-10-CM

## 2025-01-21 ENCOUNTER — RX RENEWAL (OUTPATIENT)
Age: 70
End: 2025-01-21

## 2025-03-05 ENCOUNTER — APPOINTMENT (OUTPATIENT)
Dept: HEART AND VASCULAR | Facility: CLINIC | Age: 70
End: 2025-03-05
Payer: MEDICARE

## 2025-03-05 ENCOUNTER — NON-APPOINTMENT (OUTPATIENT)
Age: 70
End: 2025-03-05

## 2025-03-05 VITALS
TEMPERATURE: 208.76 F | WEIGHT: 215 LBS | OXYGEN SATURATION: 96 % | HEIGHT: 70 IN | DIASTOLIC BLOOD PRESSURE: 72 MMHG | HEART RATE: 72 BPM | BODY MASS INDEX: 30.78 KG/M2 | SYSTOLIC BLOOD PRESSURE: 132 MMHG

## 2025-03-05 DIAGNOSIS — I10 ESSENTIAL (PRIMARY) HYPERTENSION: ICD-10-CM

## 2025-03-05 DIAGNOSIS — Q24.4 CONGENITAL SUBAORTIC STENOSIS: ICD-10-CM

## 2025-03-05 DIAGNOSIS — E78.5 HYPERLIPIDEMIA, UNSPECIFIED: ICD-10-CM

## 2025-03-05 DIAGNOSIS — I48.91 UNSPECIFIED ATRIAL FIBRILLATION: ICD-10-CM

## 2025-03-05 PROCEDURE — 93000 ELECTROCARDIOGRAM COMPLETE: CPT

## 2025-03-05 PROCEDURE — 93306 TTE W/DOPPLER COMPLETE: CPT

## 2025-03-05 PROCEDURE — G2211 COMPLEX E/M VISIT ADD ON: CPT

## 2025-03-05 PROCEDURE — 99214 OFFICE O/P EST MOD 30 MIN: CPT

## 2025-03-06 ENCOUNTER — RX RENEWAL (OUTPATIENT)
Age: 70
End: 2025-03-06

## 2025-03-06 RX ORDER — HYDROCORTISONE 2.5% 25 MG/G
2.5 CREAM TOPICAL
Qty: 30 | Refills: 0 | Status: ACTIVE | COMMUNITY
Start: 2025-03-06 | End: 1900-01-01

## 2025-03-07 ENCOUNTER — APPOINTMENT (OUTPATIENT)
Dept: NEUROSURGERY | Facility: CLINIC | Age: 70
End: 2025-03-07
Payer: MEDICARE

## 2025-03-07 VITALS
HEART RATE: 75 BPM | HEIGHT: 70 IN | RESPIRATION RATE: 18 BRPM | TEMPERATURE: 207.68 F | DIASTOLIC BLOOD PRESSURE: 70 MMHG | BODY MASS INDEX: 30.78 KG/M2 | OXYGEN SATURATION: 95 % | WEIGHT: 215 LBS | SYSTOLIC BLOOD PRESSURE: 107 MMHG

## 2025-03-07 DIAGNOSIS — G60.9 HEREDITARY AND IDIOPATHIC NEUROPATHY, UNSPECIFIED: ICD-10-CM

## 2025-03-07 DIAGNOSIS — M54.9 DORSALGIA, UNSPECIFIED: ICD-10-CM

## 2025-03-07 DIAGNOSIS — R42 DIZZINESS AND GIDDINESS: ICD-10-CM

## 2025-03-07 DIAGNOSIS — G93.0 CEREBRAL CYSTS: ICD-10-CM

## 2025-03-07 DIAGNOSIS — M50.10 CERVICAL DISC DISORDER WITH RADICULOPATHY, UNSPECIFIED CERVICAL REGION: ICD-10-CM

## 2025-03-07 PROCEDURE — 99215 OFFICE O/P EST HI 40 MIN: CPT

## 2025-04-02 ENCOUNTER — NON-APPOINTMENT (OUTPATIENT)
Age: 70
End: 2025-04-02

## 2025-04-02 ENCOUNTER — APPOINTMENT (OUTPATIENT)
Dept: PHYSICAL MEDICINE AND REHAB | Facility: CLINIC | Age: 70
End: 2025-04-02
Payer: MEDICARE

## 2025-04-02 ENCOUNTER — APPOINTMENT (OUTPATIENT)
Dept: OPHTHALMOLOGY | Facility: CLINIC | Age: 70
End: 2025-04-02
Payer: MEDICARE

## 2025-04-02 VITALS
HEART RATE: 67 BPM | BODY MASS INDEX: 30.78 KG/M2 | DIASTOLIC BLOOD PRESSURE: 72 MMHG | HEIGHT: 70 IN | WEIGHT: 215 LBS | OXYGEN SATURATION: 95 % | SYSTOLIC BLOOD PRESSURE: 152 MMHG | TEMPERATURE: 209.12 F

## 2025-04-02 DIAGNOSIS — M48.10 ANKYLOSING HYPEROSTOSIS [FORESTIER], SITE UNSPECIFIED: ICD-10-CM

## 2025-04-02 DIAGNOSIS — M54.16 RADICULOPATHY, LUMBAR REGION: ICD-10-CM

## 2025-04-02 DIAGNOSIS — M67.911 UNSPECIFIED DISORDER OF SYNOVIUM AND TENDON, RIGHT SHOULDER: ICD-10-CM

## 2025-04-02 DIAGNOSIS — G25.89 OTHER SPECIFIED EXTRAPYRAMIDAL AND MOVEMENT DISORDERS: ICD-10-CM

## 2025-04-02 DIAGNOSIS — Q76.49 OTHER CONGENITAL MALFORMATIONS OF SPINE, NOT ASSOCIATED WITH SCOLIOSIS: ICD-10-CM

## 2025-04-02 DIAGNOSIS — M62.838 OTHER MUSCLE SPASM: ICD-10-CM

## 2025-04-02 DIAGNOSIS — M25.611 STIFFNESS OF RIGHT SHOULDER, NOT ELSEWHERE CLASSIFIED: ICD-10-CM

## 2025-04-02 PROCEDURE — 92133 CPTRZD OPH DX IMG PST SGM ON: CPT

## 2025-04-02 PROCEDURE — 92083 EXTENDED VISUAL FIELD XM: CPT

## 2025-04-02 PROCEDURE — G2211 COMPLEX E/M VISIT ADD ON: CPT

## 2025-04-02 PROCEDURE — 99215 OFFICE O/P EST HI 40 MIN: CPT

## 2025-04-02 PROCEDURE — 92012 INTRM OPH EXAM EST PATIENT: CPT

## 2025-04-18 ENCOUNTER — RX RENEWAL (OUTPATIENT)
Age: 70
End: 2025-04-18

## 2025-04-19 ENCOUNTER — OUTPATIENT (OUTPATIENT)
Dept: OUTPATIENT SERVICES | Facility: HOSPITAL | Age: 70
LOS: 1 days | End: 2025-04-19

## 2025-04-19 ENCOUNTER — APPOINTMENT (OUTPATIENT)
Dept: MRI IMAGING | Facility: CLINIC | Age: 70
End: 2025-04-19
Payer: MEDICARE

## 2025-04-19 DIAGNOSIS — Z98.890 OTHER SPECIFIED POSTPROCEDURAL STATES: Chronic | ICD-10-CM

## 2025-04-19 DIAGNOSIS — Z93.8 OTHER ARTIFICIAL OPENING STATUS: Chronic | ICD-10-CM

## 2025-04-19 DIAGNOSIS — Z98.49 CATARACT EXTRACTION STATUS, UNSPECIFIED EYE: Chronic | ICD-10-CM

## 2025-04-19 PROCEDURE — 72148 MRI LUMBAR SPINE W/O DYE: CPT | Mod: 26

## 2025-04-21 ENCOUNTER — RX RENEWAL (OUTPATIENT)
Age: 70
End: 2025-04-21

## 2025-04-22 ENCOUNTER — APPOINTMENT (OUTPATIENT)
Dept: PHYSICAL MEDICINE AND REHAB | Facility: CLINIC | Age: 70
End: 2025-04-22
Payer: MEDICARE

## 2025-04-22 DIAGNOSIS — G96.191 PERINEURAL CYST: ICD-10-CM

## 2025-04-22 DIAGNOSIS — M48.10 ANKYLOSING HYPEROSTOSIS [FORESTIER], SITE UNSPECIFIED: ICD-10-CM

## 2025-04-22 DIAGNOSIS — M51.9 UNSPECIFIED THORACIC, THORACOLUMBAR AND LUMBOSACRAL INTERVERTEBRAL DISC DISORDER: ICD-10-CM

## 2025-04-22 DIAGNOSIS — R26.89 OTHER ABNORMALITIES OF GAIT AND MOBILITY: ICD-10-CM

## 2025-04-22 DIAGNOSIS — G25.89 OTHER SPECIFIED EXTRAPYRAMIDAL AND MOVEMENT DISORDERS: ICD-10-CM

## 2025-04-22 DIAGNOSIS — M54.16 RADICULOPATHY, LUMBAR REGION: ICD-10-CM

## 2025-04-22 DIAGNOSIS — R26.9 UNSPECIFIED ABNORMALITIES OF GAIT AND MOBILITY: ICD-10-CM

## 2025-04-22 PROCEDURE — G2211 COMPLEX E/M VISIT ADD ON: CPT | Mod: 2W

## 2025-04-22 PROCEDURE — 99214 OFFICE O/P EST MOD 30 MIN: CPT | Mod: 2W

## 2025-04-23 PROBLEM — M51.9 INTERVERTEBRAL THORACIC DISC DISORDER: Status: ACTIVE | Noted: 2025-04-22

## 2025-04-23 PROBLEM — G96.191 PERINEURAL CYST: Status: ACTIVE | Noted: 2025-04-22

## 2025-05-08 ENCOUNTER — RX RENEWAL (OUTPATIENT)
Age: 70
End: 2025-05-08

## 2025-05-16 ENCOUNTER — RX RENEWAL (OUTPATIENT)
Age: 70
End: 2025-05-16

## 2025-05-28 ENCOUNTER — APPOINTMENT (OUTPATIENT)
Dept: INTERNAL MEDICINE | Facility: CLINIC | Age: 70
End: 2025-05-28
Payer: MEDICARE

## 2025-05-28 VITALS
HEIGHT: 70 IN | TEMPERATURE: 208.76 F | HEART RATE: 70 BPM | DIASTOLIC BLOOD PRESSURE: 64 MMHG | OXYGEN SATURATION: 98 % | SYSTOLIC BLOOD PRESSURE: 135 MMHG | BODY MASS INDEX: 31.35 KG/M2 | WEIGHT: 219 LBS

## 2025-05-28 DIAGNOSIS — R29.3 ABNORMAL POSTURE: ICD-10-CM

## 2025-05-28 DIAGNOSIS — R13.10 DYSPHAGIA, UNSPECIFIED: ICD-10-CM

## 2025-05-28 DIAGNOSIS — M54.16 RADICULOPATHY, LUMBAR REGION: ICD-10-CM

## 2025-05-28 DIAGNOSIS — I10 ESSENTIAL (PRIMARY) HYPERTENSION: ICD-10-CM

## 2025-05-28 DIAGNOSIS — I48.91 UNSPECIFIED ATRIAL FIBRILLATION: ICD-10-CM

## 2025-05-28 PROCEDURE — G2211 COMPLEX E/M VISIT ADD ON: CPT

## 2025-05-28 PROCEDURE — 99215 OFFICE O/P EST HI 40 MIN: CPT

## 2025-05-30 ENCOUNTER — APPOINTMENT (OUTPATIENT)
Dept: UROLOGY | Facility: CLINIC | Age: 70
End: 2025-05-30
Payer: MEDICARE

## 2025-05-30 VITALS
HEART RATE: 71 BPM | OXYGEN SATURATION: 98 % | TEMPERATURE: 98 F | DIASTOLIC BLOOD PRESSURE: 76 MMHG | SYSTOLIC BLOOD PRESSURE: 155 MMHG

## 2025-05-30 VITALS — DIASTOLIC BLOOD PRESSURE: 80 MMHG | SYSTOLIC BLOOD PRESSURE: 128 MMHG

## 2025-05-30 DIAGNOSIS — R31.29 OTHER MICROSCOPIC HEMATURIA: ICD-10-CM

## 2025-05-30 DIAGNOSIS — K64.9 UNSPECIFIED HEMORRHOIDS: ICD-10-CM

## 2025-05-30 DIAGNOSIS — R68.82 DECREASED LIBIDO: ICD-10-CM

## 2025-05-30 DIAGNOSIS — N52.01 ERECTILE DYSFUNCTION DUE TO ARTERIAL INSUFFICIENCY: ICD-10-CM

## 2025-05-30 LAB
BILIRUB UR QL STRIP: NORMAL
CLARITY UR: CLEAR
COLLECTION METHOD: NORMAL
GLUCOSE UR-MCNC: NORMAL
HCG UR QL: 0.2 EU/DL
HGB UR QL STRIP.AUTO: NORMAL
KETONES UR-MCNC: NORMAL
LEUKOCYTE ESTERASE UR QL STRIP: NORMAL
NITRITE UR QL STRIP: NORMAL
PH UR STRIP: 6
PROT UR STRIP-MCNC: NORMAL
SP GR UR STRIP: 1.02

## 2025-05-30 PROCEDURE — 99214 OFFICE O/P EST MOD 30 MIN: CPT

## 2025-05-30 RX ORDER — HYDROCORTISONE ACETATE 25 MG/1
25 SUPPOSITORY RECTAL TWICE DAILY
Qty: 10 | Refills: 0 | Status: ACTIVE | COMMUNITY
Start: 2025-05-30 | End: 1900-01-01

## 2025-06-04 ENCOUNTER — NON-APPOINTMENT (OUTPATIENT)
Age: 70
End: 2025-06-04

## 2025-06-04 LAB — PSA SERPL-MCNC: 0.88 NG/ML

## 2025-06-17 ENCOUNTER — RX RENEWAL (OUTPATIENT)
Age: 70
End: 2025-06-17

## 2025-06-19 ENCOUNTER — RX RENEWAL (OUTPATIENT)
Age: 70
End: 2025-06-19

## 2025-06-23 ENCOUNTER — TRANSCRIPTION ENCOUNTER (OUTPATIENT)
Age: 70
End: 2025-06-23

## 2025-07-01 ENCOUNTER — TRANSCRIPTION ENCOUNTER (OUTPATIENT)
Age: 70
End: 2025-07-01

## 2025-07-03 ENCOUNTER — TRANSCRIPTION ENCOUNTER (OUTPATIENT)
Age: 70
End: 2025-07-03

## 2025-07-08 ENCOUNTER — NON-APPOINTMENT (OUTPATIENT)
Age: 70
End: 2025-07-08

## 2025-07-10 ENCOUNTER — APPOINTMENT (OUTPATIENT)
Dept: RADIOLOGY | Facility: CLINIC | Age: 70
End: 2025-07-10

## 2025-07-10 ENCOUNTER — OUTPATIENT (OUTPATIENT)
Dept: OUTPATIENT SERVICES | Facility: HOSPITAL | Age: 70
LOS: 1 days | End: 2025-07-10

## 2025-07-10 DIAGNOSIS — Z93.8 OTHER ARTIFICIAL OPENING STATUS: Chronic | ICD-10-CM

## 2025-07-10 DIAGNOSIS — Z98.890 OTHER SPECIFIED POSTPROCEDURAL STATES: Chronic | ICD-10-CM

## 2025-07-10 DIAGNOSIS — Z98.49 CATARACT EXTRACTION STATUS, UNSPECIFIED EYE: Chronic | ICD-10-CM

## 2025-07-10 PROCEDURE — 77085 DXA BONE DENSITY AXL VRT FX: CPT | Mod: 26

## 2025-07-12 ENCOUNTER — APPOINTMENT (OUTPATIENT)
Dept: MRI IMAGING | Facility: CLINIC | Age: 70
End: 2025-07-12

## 2025-07-14 ENCOUNTER — OUTPATIENT (OUTPATIENT)
Dept: OUTPATIENT SERVICES | Facility: HOSPITAL | Age: 70
LOS: 1 days | End: 2025-07-14

## 2025-07-14 ENCOUNTER — TRANSCRIPTION ENCOUNTER (OUTPATIENT)
Age: 70
End: 2025-07-14

## 2025-07-14 ENCOUNTER — APPOINTMENT (OUTPATIENT)
Dept: MRI IMAGING | Facility: CLINIC | Age: 70
End: 2025-07-14
Payer: MEDICARE

## 2025-07-14 DIAGNOSIS — Z98.49 CATARACT EXTRACTION STATUS, UNSPECIFIED EYE: Chronic | ICD-10-CM

## 2025-07-14 DIAGNOSIS — Z93.8 OTHER ARTIFICIAL OPENING STATUS: Chronic | ICD-10-CM

## 2025-07-14 DIAGNOSIS — Z98.890 OTHER SPECIFIED POSTPROCEDURAL STATES: Chronic | ICD-10-CM

## 2025-07-14 PROCEDURE — 71555 MRI ANGIO CHEST W OR W/O DYE: CPT | Mod: 26

## 2025-07-14 PROCEDURE — 72146 MRI CHEST SPINE W/O DYE: CPT | Mod: 26

## 2025-07-16 ENCOUNTER — APPOINTMENT (OUTPATIENT)
Dept: CARDIOTHORACIC SURGERY | Facility: CLINIC | Age: 70
End: 2025-07-16
Payer: MEDICARE

## 2025-07-16 VITALS
OXYGEN SATURATION: 95 % | HEART RATE: 74 BPM | HEIGHT: 70 IN | BODY MASS INDEX: 31.35 KG/M2 | WEIGHT: 219 LBS | SYSTOLIC BLOOD PRESSURE: 121 MMHG | DIASTOLIC BLOOD PRESSURE: 57 MMHG | TEMPERATURE: 97.5 F

## 2025-07-16 PROCEDURE — 99204 OFFICE O/P NEW MOD 45 MIN: CPT

## 2025-07-17 ENCOUNTER — TRANSCRIPTION ENCOUNTER (OUTPATIENT)
Age: 70
End: 2025-07-17

## 2025-07-18 ENCOUNTER — TRANSCRIPTION ENCOUNTER (OUTPATIENT)
Age: 70
End: 2025-07-18

## 2025-07-23 ENCOUNTER — TRANSCRIPTION ENCOUNTER (OUTPATIENT)
Age: 70
End: 2025-07-23

## 2025-07-23 RX ORDER — LOSARTAN POTASSIUM 25 MG/1
25 TABLET, FILM COATED ORAL DAILY
Qty: 1 | Refills: 3 | Status: ACTIVE | COMMUNITY
Start: 2025-07-23 | End: 1900-01-01

## 2025-07-29 ENCOUNTER — TRANSCRIPTION ENCOUNTER (OUTPATIENT)
Age: 70
End: 2025-07-29

## 2025-08-07 ENCOUNTER — TRANSCRIPTION ENCOUNTER (OUTPATIENT)
Age: 70
End: 2025-08-07

## 2025-08-07 ENCOUNTER — APPOINTMENT (OUTPATIENT)
Dept: PHYSICAL MEDICINE AND REHAB | Facility: CLINIC | Age: 70
End: 2025-08-07
Payer: MEDICARE

## 2025-08-07 DIAGNOSIS — G25.89 OTHER SPECIFIED EXTRAPYRAMIDAL AND MOVEMENT DISORDERS: ICD-10-CM

## 2025-08-07 DIAGNOSIS — M54.16 RADICULOPATHY, LUMBAR REGION: ICD-10-CM

## 2025-08-07 DIAGNOSIS — R26.9 UNSPECIFIED ABNORMALITIES OF GAIT AND MOBILITY: ICD-10-CM

## 2025-08-07 DIAGNOSIS — R26.89 OTHER ABNORMALITIES OF GAIT AND MOBILITY: ICD-10-CM

## 2025-08-07 DIAGNOSIS — R29.3 ABNORMAL POSTURE: ICD-10-CM

## 2025-08-07 DIAGNOSIS — I71.21 ANEURYSM OF THE ASCENDING AORTA, WITHOUT RUPTURE: ICD-10-CM

## 2025-08-07 DIAGNOSIS — M48.10 ANKYLOSING HYPEROSTOSIS [FORESTIER], SITE UNSPECIFIED: ICD-10-CM

## 2025-08-07 PROCEDURE — G2211 COMPLEX E/M VISIT ADD ON: CPT | Mod: 2W

## 2025-08-07 PROCEDURE — 99214 OFFICE O/P EST MOD 30 MIN: CPT | Mod: 2W

## 2025-08-12 ENCOUNTER — APPOINTMENT (OUTPATIENT)
Dept: INTERNAL MEDICINE | Facility: CLINIC | Age: 70
End: 2025-08-12
Payer: MEDICARE

## 2025-08-12 DIAGNOSIS — M17.0 BILATERAL PRIMARY OSTEOARTHRITIS OF KNEE: ICD-10-CM

## 2025-08-12 DIAGNOSIS — E87.5 HYPERKALEMIA: ICD-10-CM

## 2025-08-12 PROCEDURE — 99214 OFFICE O/P EST MOD 30 MIN: CPT | Mod: 93

## 2025-08-14 ENCOUNTER — TRANSCRIPTION ENCOUNTER (OUTPATIENT)
Age: 70
End: 2025-08-14

## 2025-09-03 ENCOUNTER — TRANSCRIPTION ENCOUNTER (OUTPATIENT)
Age: 70
End: 2025-09-03

## 2025-09-04 ENCOUNTER — TRANSCRIPTION ENCOUNTER (OUTPATIENT)
Age: 70
End: 2025-09-04

## 2025-09-05 ENCOUNTER — TRANSCRIPTION ENCOUNTER (OUTPATIENT)
Age: 70
End: 2025-09-05

## 2025-09-10 ENCOUNTER — APPOINTMENT (OUTPATIENT)
Dept: HEART AND VASCULAR | Facility: CLINIC | Age: 70
End: 2025-09-10
Payer: MEDICARE

## 2025-09-10 VITALS
TEMPERATURE: 98.7 F | WEIGHT: 214.25 LBS | OXYGEN SATURATION: 98 % | SYSTOLIC BLOOD PRESSURE: 120 MMHG | HEART RATE: 62 BPM | DIASTOLIC BLOOD PRESSURE: 60 MMHG | BODY MASS INDEX: 30.67 KG/M2 | HEIGHT: 70 IN

## 2025-09-10 DIAGNOSIS — I48.91 UNSPECIFIED ATRIAL FIBRILLATION: ICD-10-CM

## 2025-09-10 DIAGNOSIS — I10 ESSENTIAL (PRIMARY) HYPERTENSION: ICD-10-CM

## 2025-09-10 DIAGNOSIS — Q24.4 CONGENITAL SUBAORTIC STENOSIS: ICD-10-CM

## 2025-09-10 DIAGNOSIS — E78.5 HYPERLIPIDEMIA, UNSPECIFIED: ICD-10-CM

## 2025-09-10 DIAGNOSIS — I71.21 ANEURYSM OF THE ASCENDING AORTA, WITHOUT RUPTURE: ICD-10-CM

## 2025-09-10 PROCEDURE — G2211 COMPLEX E/M VISIT ADD ON: CPT

## 2025-09-10 PROCEDURE — 99214 OFFICE O/P EST MOD 30 MIN: CPT

## 2025-09-10 PROCEDURE — 93000 ELECTROCARDIOGRAM COMPLETE: CPT

## 2025-09-11 ENCOUNTER — NON-APPOINTMENT (OUTPATIENT)
Age: 70
End: 2025-09-11

## 2025-09-11 ENCOUNTER — TRANSCRIPTION ENCOUNTER (OUTPATIENT)
Age: 70
End: 2025-09-11

## 2025-09-15 ENCOUNTER — TRANSCRIPTION ENCOUNTER (OUTPATIENT)
Age: 70
End: 2025-09-15

## (undated) DEVICE — APPLICATOR COTTON TIP 3" STERILE

## (undated) DEVICE — KIT CENTURION ANTERIOR

## (undated) DEVICE — PACK ANTERIOR SEGMENT

## (undated) DEVICE — DRAPE MICROSCOPE KNOB COVER SMALL (2 PCS)

## (undated) DEVICE — WARMING BLANKET UPPER ADULT

## (undated) DEVICE — GLV 7.5 PROTEXIS (WHITE)

## (undated) DEVICE — SOL IRR BAG BSS 500ML

## (undated) DEVICE — KNIFE ALCON PARACENTESIS CLEARCUT SIDEPORT 1MM (YELLOW)

## (undated) DEVICE — PACK CENTURION 2.4MM

## (undated) DEVICE — TRANSFORMER INTREPID I/A 0.3MM

## (undated) DEVICE — GONIO LENS IPRISM S 1.1X LEFT HAND

## (undated) DEVICE — CANNULA IRR ALCON ANTERIOR CHAMBER 30G

## (undated) DEVICE — CARTRIDGE PLATINUM